# Patient Record
Sex: MALE | Race: WHITE | NOT HISPANIC OR LATINO | Employment: FULL TIME | URBAN - METROPOLITAN AREA
[De-identification: names, ages, dates, MRNs, and addresses within clinical notes are randomized per-mention and may not be internally consistent; named-entity substitution may affect disease eponyms.]

---

## 2017-03-14 ENCOUNTER — OFFICE VISIT (OUTPATIENT)
Dept: INTERNAL MEDICINE | Facility: CLINIC | Age: 67
End: 2017-03-14

## 2017-03-14 VITALS
BODY MASS INDEX: 26.91 KG/M2 | DIASTOLIC BLOOD PRESSURE: 92 MMHG | WEIGHT: 177.56 LBS | HEART RATE: 72 BPM | RESPIRATION RATE: 20 BRPM | SYSTOLIC BLOOD PRESSURE: 140 MMHG | HEIGHT: 68 IN | TEMPERATURE: 97.1 F

## 2017-03-14 DIAGNOSIS — Z11.59 NEED FOR HEPATITIS C SCREENING TEST: ICD-10-CM

## 2017-03-14 DIAGNOSIS — Z12.5 SCREENING FOR PROSTATE CANCER: ICD-10-CM

## 2017-03-14 DIAGNOSIS — I10 ESSENTIAL HYPERTENSION: Primary | ICD-10-CM

## 2017-03-14 DIAGNOSIS — Z12.11 SCREENING FOR COLON CANCER: ICD-10-CM

## 2017-03-14 DIAGNOSIS — Z00.00 ROUTINE ADULT HEALTH MAINTENANCE: ICD-10-CM

## 2017-03-14 DIAGNOSIS — Z23 NEED FOR TDAP VACCINATION: ICD-10-CM

## 2017-03-14 PROBLEM — M19.90 OSTEOARTHRITIS: Status: ACTIVE | Noted: 2017-03-14

## 2017-03-14 PROBLEM — N28.81 LARGE KIDNEY: Status: ACTIVE | Noted: 2017-03-14

## 2017-03-14 PROBLEM — G47.33 OBSTRUCTIVE SLEEP APNEA SYNDROME: Status: ACTIVE | Noted: 2017-03-14

## 2017-03-14 PROBLEM — K40.90 INGUINAL HERNIA: Status: ACTIVE | Noted: 2017-03-14

## 2017-03-14 LAB
ALBUMIN SERPL-MCNC: 4.6 G/DL (ref 3.2–4.8)
ALBUMIN/GLOB SERPL: 1.7 G/DL (ref 1.5–2.5)
ALP SERPL-CCNC: 67 U/L (ref 25–100)
ALT SERPL W P-5'-P-CCNC: 28 U/L (ref 7–40)
ANION GAP SERPL CALCULATED.3IONS-SCNC: 0 MMOL/L (ref 3–11)
ARTICHOKE IGE QN: 150 MG/DL (ref 0–130)
AST SERPL-CCNC: 24 U/L (ref 0–33)
BASOPHILS # BLD AUTO: 0.04 10*3/MM3 (ref 0–0.2)
BASOPHILS NFR BLD AUTO: 0.5 % (ref 0–1)
BILIRUB SERPL-MCNC: 0.9 MG/DL (ref 0.3–1.2)
BUN BLD-MCNC: 17 MG/DL (ref 9–23)
BUN/CREAT SERPL: 17 (ref 7–25)
CALCIUM SPEC-SCNC: 10.5 MG/DL (ref 8.7–10.4)
CHLORIDE SERPL-SCNC: 103 MMOL/L (ref 99–109)
CHOLEST SERPL-MCNC: 228 MG/DL (ref 0–200)
CLARITY, POC: CLEAR
CO2 SERPL-SCNC: 38 MMOL/L (ref 20–31)
COLOR UR: YELLOW
CREAT BLD-MCNC: 1 MG/DL (ref 0.6–1.3)
DEPRECATED RDW RBC AUTO: 42.2 FL (ref 37–54)
EOSINOPHIL # BLD AUTO: 0.28 10*3/MM3 (ref 0.1–0.3)
EOSINOPHIL NFR BLD AUTO: 3.8 % (ref 0–3)
ERYTHROCYTE [DISTWIDTH] IN BLOOD BY AUTOMATED COUNT: 12.4 % (ref 11.3–14.5)
EXPIRATION DATE: ABNORMAL
GFR SERPL CREATININE-BSD FRML MDRD: 75 ML/MIN/1.73
GLOBULIN UR ELPH-MCNC: 2.7 GM/DL
GLUCOSE BLD-MCNC: 93 MG/DL (ref 70–100)
GLUCOSE UR STRIP-MCNC: NEGATIVE MG/DL
HCT VFR BLD AUTO: 51.9 % (ref 38.9–50.9)
HCV AB SER DONR QL: NORMAL
HDLC SERPL-MCNC: 43 MG/DL (ref 40–60)
HGB BLD-MCNC: 17.7 G/DL (ref 13.1–17.5)
IMM GRANULOCYTES # BLD: 0.05 10*3/MM3 (ref 0–0.03)
IMM GRANULOCYTES NFR BLD: 0.7 % (ref 0–0.6)
KETONES UR QL: NEGATIVE
LEUKOCYTE EST, POC: NEGATIVE
LYMPHOCYTES # BLD AUTO: 1.37 10*3/MM3 (ref 0.6–4.8)
LYMPHOCYTES NFR BLD AUTO: 18.8 % (ref 24–44)
Lab: ABNORMAL
MCH RBC QN AUTO: 31.7 PG (ref 27–31)
MCHC RBC AUTO-ENTMCNC: 34.1 G/DL (ref 32–36)
MCV RBC AUTO: 92.8 FL (ref 80–99)
MONOCYTES # BLD AUTO: 0.61 10*3/MM3 (ref 0–1)
MONOCYTES NFR BLD AUTO: 8.4 % (ref 0–12)
NEUTROPHILS # BLD AUTO: 4.94 10*3/MM3 (ref 1.5–8.3)
NEUTROPHILS NFR BLD AUTO: 67.8 % (ref 41–71)
NITRITE UR-MCNC: NEGATIVE MG/ML
PH UR: 7 [PH] (ref 5–8)
PLATELET # BLD AUTO: 285 10*3/MM3 (ref 150–450)
PMV BLD AUTO: 9.8 FL (ref 6–12)
POTASSIUM BLD-SCNC: 4.3 MMOL/L (ref 3.5–5.5)
PROT SERPL-MCNC: 7.3 G/DL (ref 5.7–8.2)
PROT UR STRIP-MCNC: NEGATIVE MG/DL
PROT/CREAT UR: 50 MG/G CREA
PSA SERPL-MCNC: 3.2 NG/ML (ref 0–4)
RBC # BLD AUTO: 5.59 10*6/MM3 (ref 4.2–5.76)
RBC # UR STRIP: ABNORMAL /UL
SODIUM BLD-SCNC: 141 MMOL/L (ref 132–146)
SP GR UR: 1.01 (ref 1–1.03)
TRIGL SERPL-MCNC: 258 MG/DL (ref 0–150)
TSH SERPL DL<=0.05 MIU/L-ACNC: 1.6 MIU/ML (ref 0.35–5.35)
WBC NRBC COR # BLD: 7.29 10*3/MM3 (ref 3.5–10.8)

## 2017-03-14 PROCEDURE — 81002 URINALYSIS NONAUTO W/O SCOPE: CPT | Performed by: INTERNAL MEDICINE

## 2017-03-14 PROCEDURE — 36415 COLL VENOUS BLD VENIPUNCTURE: CPT | Performed by: INTERNAL MEDICINE

## 2017-03-14 PROCEDURE — 84153 ASSAY OF PSA TOTAL: CPT | Performed by: INTERNAL MEDICINE

## 2017-03-14 PROCEDURE — 84443 ASSAY THYROID STIM HORMONE: CPT | Performed by: INTERNAL MEDICINE

## 2017-03-14 PROCEDURE — 99214 OFFICE O/P EST MOD 30 MIN: CPT | Performed by: INTERNAL MEDICINE

## 2017-03-14 PROCEDURE — 86803 HEPATITIS C AB TEST: CPT | Performed by: INTERNAL MEDICINE

## 2017-03-14 PROCEDURE — 93000 ELECTROCARDIOGRAM COMPLETE: CPT | Performed by: INTERNAL MEDICINE

## 2017-03-14 PROCEDURE — 90471 IMMUNIZATION ADMIN: CPT | Performed by: INTERNAL MEDICINE

## 2017-03-14 PROCEDURE — 90715 TDAP VACCINE 7 YRS/> IM: CPT | Performed by: INTERNAL MEDICINE

## 2017-03-14 PROCEDURE — 80061 LIPID PANEL: CPT | Performed by: INTERNAL MEDICINE

## 2017-03-14 PROCEDURE — 85025 COMPLETE CBC W/AUTO DIFF WBC: CPT | Performed by: INTERNAL MEDICINE

## 2017-03-14 PROCEDURE — 80053 COMPREHEN METABOLIC PANEL: CPT | Performed by: INTERNAL MEDICINE

## 2017-03-14 RX ORDER — HYDROCHLOROTHIAZIDE 25 MG/1
25 TABLET ORAL DAILY
Qty: 30 TABLET | Refills: 5 | Status: SHIPPED | OUTPATIENT
Start: 2017-03-14 | End: 2022-05-09 | Stop reason: SDUPTHER

## 2017-03-14 RX ORDER — ASPIRIN 81 MG/1
81 TABLET ORAL DAILY
COMMUNITY
End: 2022-06-15

## 2017-03-14 NOTE — PROGRESS NOTES
Subjective       James Meese is a 66 y.o. male.     Chief Complaint   Patient presents with   • Hypertension     3 weeks        History obtained from the patient.      History of Present Illness       Primary Care Cardiac Diagnostic Constellation: The patient is here today for a follow-up visit.     His Hypertension has been unstable   The patient is not adherent with his medication regimen.   The patient complains of medication side effects. Medication side effects: urinary frequency.     Interval Events: He was last seen 9/29/15. HCTZ was started in September 2015, but he stopped it 2-3 months after that. Blood pressure was running 120-130 / 70-80.  It has been elevated for 2-3 weeks, 140-150 / .  He has had increased work and marital stress lately.       Symptoms: Denies chest pain, denies intermittent leg claudication, denies dyspnea, denies lower extremity edema and denies fatigue.   Associated symptoms include recent 7 pounds weight gain, but no palpitations, no syncope, no headache, no orthopnea and no PND.     Lifestyle and Disease Management: Diet: He consumes a diverse and healthy diet. Weight Issues: He has weight concerns. Exercise: He exercises regularly. He exercises 3 times per week. Exercise includes walking and strength training.   Smoking: He does not use tobacco.       No current outpatient prescriptions on file prior to visit.     No current facility-administered medications on file prior to visit.          The following portions of the patient's history were reviewed and updated as appropriate: allergies, current medications, past family history, past medical history, past social history, past surgical history and problem list.    Review of Systems   Constitutional: Negative for fatigue and unexpected weight change.   Eyes: Negative for visual disturbance.   Respiratory: Positive for chest tightness. Negative for cough, shortness of breath and wheezing.    Cardiovascular: Negative for  "chest pain, palpitations and leg swelling.        No GAVIN, orthopnea, or claudication.   Gastrointestinal: Negative for abdominal pain, blood in stool, constipation, diarrhea, nausea and vomiting. Anal bleeding: melena.   Endocrine: Negative for polydipsia and polyuria.   Musculoskeletal: Negative for arthralgias and myalgias.   Neurological: Negative for dizziness, syncope, light-headedness and headaches.        No memory issues.   Psychiatric/Behavioral: Negative for decreased concentration, sleep disturbance and suicidal ideas. The patient is nervous/anxious.         Denies depression.         Objective       Blood pressure 140/92, pulse 72, temperature 97.1 °F (36.2 °C), temperature source Temporal Artery , resp. rate 20, height 68.25\" (173.4 cm), weight 177 lb 9 oz (80.5 kg).      Physical Exam   Constitutional: He appears well-developed and well-nourished.   Neck: Normal range of motion. Neck supple. Carotid bruit is not present. No thyromegaly present.   Cardiovascular: Normal rate, regular rhythm, normal heart sounds and intact distal pulses.  Exam reveals no gallop and no friction rub.    No murmur heard.  No peripheral edema.   Pulmonary/Chest: Effort normal and breath sounds normal.   Abdominal: Soft. Bowel sounds are normal. He exhibits no distension, no abdominal bruit and no mass. There is no hepatosplenomegaly. There is no tenderness.   Psychiatric: He has a normal mood and affect.   Nursing note and vitals reviewed.         ECG 12 Lead  Date/Time: 3/14/2017 11:10 AM  Performed by: ASHLEY POSADA  Authorized by: ASHLEY POSADA   Comparison: compared with previous ECG from 9/29/2015  Similar to previous ECG  Rhythm: sinus rhythm  Ectopy comments: none  Rate: normal  Conduction: conduction normal  ST Segments: ST segments normal  QRS axis: normal  Other findings comments: nonspecific T wave changes, low voltage  Clinical impression: abnormal ECG          POC Urinalysis Dipstick, Multipro   Result Value " Ref Range    Color Yellow Yellow, Straw, Dark Yellow, Zoila    Clarity, UA Clear Clear    Glucose, UA Negative Negative, 1000 mg/dL (3+) mg/dL    Ketones, UA Negative Negative    Specific Gravity  1.010 1.005 - 1.030    Blood, UA Trace (A) Negative    pH, Urine 7.0 5.0 - 8.0    Protein, POC Negative Negative mg/dL    Leukocytes Negative Negative    Nitrite, UA Negative Negative    Protein/Creatinine Ratio, Urine 50.0 mg/G Crea    Lot Number [id number]     Expiration Date 10-17          Assessment / Plan:    Raudel was seen today for hypertension.    Diagnoses and all orders for this visit:    Essential hypertension  -     TSH  -     Lipid Panel  -     Comprehensive Metabolic Panel  -     CBC & Differential  -     POC Urinalysis Dipstick, Multipro  -     ECG 12 Lead  -     hydrochlorothiazide (HYDRODIURIL) 25 MG tablet; Take 1 tablet by mouth Daily.  -     CBC Auto Differential    Need for hepatitis C screening test  -     Hepatitis C Antibody    Need for Tdap vaccination  -     Tdap Vaccine Greater Than or Equal To 8yo IM    Screening for prostate cancer  -     PSA    Routine adult health maintenance  -     Tdap Vaccine Greater Than or Equal To 8yo IM    Screening for colon cancer  -     Ambulatory Referral For Screening Colonoscopy      Return in about 2 weeks (around 3/28/2017) for Recheck-HTN, fasting.

## 2017-03-15 PROBLEM — N28.1 SIMPLE RENAL CYST: Status: ACTIVE | Noted: 2017-03-14

## 2017-03-15 PROBLEM — K40.90 INGUINAL HERNIA: Status: RESOLVED | Noted: 2017-03-14 | Resolved: 2017-03-15

## 2017-03-17 DIAGNOSIS — R31.29 MICROSCOPIC HEMATURIA: ICD-10-CM

## 2017-03-17 DIAGNOSIS — D75.1 POLYCYTHEMIA, SECONDARY: ICD-10-CM

## 2017-03-17 DIAGNOSIS — I10 ESSENTIAL HYPERTENSION: Primary | ICD-10-CM

## 2017-03-21 ENCOUNTER — HOSPITAL ENCOUNTER (OUTPATIENT)
Dept: ULTRASOUND IMAGING | Facility: HOSPITAL | Age: 67
Discharge: HOME | End: 2017-03-21
Attending: INTERNAL MEDICINE | Admitting: INTERNAL MEDICINE

## 2017-03-21 DIAGNOSIS — R31.29 MICROSCOPIC HEMATURIA: ICD-10-CM

## 2017-03-21 DIAGNOSIS — I10 ESSENTIAL HYPERTENSION: ICD-10-CM

## 2017-03-21 DIAGNOSIS — D75.1 POLYCYTHEMIA, SECONDARY: ICD-10-CM

## 2017-03-21 PROCEDURE — 76775 US EXAM ABDO BACK WALL LIM: CPT

## 2017-03-27 ENCOUNTER — TELEPHONE (OUTPATIENT)
Dept: INTERNAL MEDICINE | Facility: CLINIC | Age: 67
End: 2017-03-27

## 2017-03-27 NOTE — TELEPHONE ENCOUNTER
----- Message from Zoila Newberry sent at 3/27/2017  9:09 AM EDT -----  PTS WIFE CALLED, TERRENCE    PT IS IN [place] WITH FAMILY, HIS FATHER PASSED AWAY THIS WEEKEND. THEY WILL CALL BACK TO R/S. I HAVE CANCELLED APPT. TERRENCE WANTED TO LET YOU KNOW THE REASON BC THEY HAVE BEEN WITH YOU SEVERAL YEARS, SHE STATED.     [phone], TERRENCE

## 2020-02-05 PROBLEM — C44.311 BASAL CELL CARCINOMA (BCC) OF SKIN OF NOSE: Status: ACTIVE | Noted: 2020-02-05

## 2020-08-11 PROBLEM — D03.59 MELANOMA IN SITU OF TORSO EXCLUDING BREAST: Status: ACTIVE | Noted: 2020-08-11

## 2020-08-11 PROBLEM — C44.320 SCC (SQUAMOUS CELL CARCINOMA), FACE: Status: ACTIVE | Noted: 2020-08-11

## 2022-04-19 ENCOUNTER — TELEPHONE (OUTPATIENT)
Dept: INTERNAL MEDICINE | Facility: CLINIC | Age: 72
End: 2022-04-19

## 2022-05-09 ENCOUNTER — OFFICE VISIT (OUTPATIENT)
Dept: INTERNAL MEDICINE | Facility: CLINIC | Age: 72
End: 2022-05-09

## 2022-05-09 VITALS
TEMPERATURE: 98.2 F | DIASTOLIC BLOOD PRESSURE: 80 MMHG | SYSTOLIC BLOOD PRESSURE: 144 MMHG | BODY MASS INDEX: 25.61 KG/M2 | RESPIRATION RATE: 20 BRPM | HEART RATE: 80 BPM | WEIGHT: 169 LBS | HEIGHT: 68 IN

## 2022-05-09 DIAGNOSIS — Z12.5 SCREENING FOR PROSTATE CANCER: ICD-10-CM

## 2022-05-09 DIAGNOSIS — Z12.11 SCREENING FOR COLON CANCER: ICD-10-CM

## 2022-05-09 DIAGNOSIS — I10 ESSENTIAL HYPERTENSION: Primary | ICD-10-CM

## 2022-05-09 PROCEDURE — 99204 OFFICE O/P NEW MOD 45 MIN: CPT | Performed by: INTERNAL MEDICINE

## 2022-05-09 RX ORDER — HYDROCHLOROTHIAZIDE 25 MG/1
25 TABLET ORAL DAILY
Qty: 30 TABLET | Refills: 5 | Status: SHIPPED | OUTPATIENT
Start: 2022-05-09 | End: 2022-09-30 | Stop reason: SINTOL

## 2022-05-09 NOTE — PATIENT INSTRUCTIONS
I recommend Shingrix (new Shingles vaccine) and Hepatitis A vaccination at the pharmacy.    Please call if you would like to have a Cardiac CT Scan scheduled (information given today).    MyPlate from USDA    MyPlate is an outline of a general healthy diet based on the 2010 Dietary Guidelines for Americans, from the U.S. Department of Agriculture (USDA). It sets guidelines for how To follow MyPlate recommendations:  Eat a wide variety of fruits and vegetables, grains, and protein foods.  Serve smaller portions and eat less food throughout the day.  Limit portion sizes to avoid overeating.  Enjoy your food.  Get at least 150 minutes of exercise every week. This is about 30 minutes each day, 5 or more days per week.  It can be difficult to have every meal look like MyPlate. Think about MyPlate as eating guidelines for an entire day, rather than each individual meal.  Fruits and vegetables  Make half of your plate fruits and vegetables.  Eat many different colors of fruits and vegetables each day.  For a 2,000 calorie daily food plan, eat:  2½ cups of vegetables every day.  2 cups of fruit every day.  1 cup is equal to:  1 cup raw or cooked vegetables.  1 cup raw fruit.  1 medium-sized orange, apple, or banana.  1 cup 100% fruit or vegetable juice.  2 cups raw leafy greens, such as lettuce, spinach, or kale.  ½ cup dried fruit.  Grains  One fourth of your plate should be grains.  Make at least half of the grains you eat each day whole grains.  For a 2,000 calorie daily food plan, eat 6 oz of grains every day.  1 oz is equal to:  1 slice bread.  1 cup cereal.  ½ cup cooked rice, cereal, or pasta.  Protein  One fourth of your plate should be protein.  Eat a wide variety of protein foods, including meat, poultry, fish, eggs, beans, nuts, and tofu.  For a 2,000 calorie daily food plan, eat 5½ oz of protein every day.  1 oz is equal to:  1 oz meat, poultry, or fish.  ¼ cup cooked beans.  1 egg.  ½ oz nuts or seeds.  1 Tbsp  peanut butter.  Dairy  Drink fat-free or low-fat (1%) milk.  Eat or drink dairy as a side to meals.  For a 2,000 calorie daily food plan, eat or drink 3 cups of dairy every day.  1 cup is equal to:  1 cup milk, yogurt, cottage cheese, or soy milk (soy beverage).  2 oz processed cheese.  1½ oz natural cheese.  Fats, oils, salt, and sugars  Only small amounts of oils are recommended.  Avoid foods that are high in calories and low in nutritional value (empty calories), like foods high in fat or added sugars.  Choose foods that are low in salt (sodium). Choose foods that have less than 140 milligrams (mg) of sodium per serving.  Drink water instead of sugary drinks. Drink enough water each day to keep your urine pale yellow.  Where to find support  Work with your health care provider or a nutrition specialist (dietitian) to develop a customized eating plan that is right for you.  Download an lynnette (mobile application) to help you track your daily food intake.  Where to find more information  Go to ChooseMyPlate.gov for more information.  Summary  MyPlate is a general guideline for healthy eating from the USDA. It is based on the 2010 Dietary Guidelines for Americans.  In general, fruits and vegetables should take up ½ of your plate, grains should take up ¼ of your plate, and protein should take up ¼ of your plate.  This information is not intended to replace advice given to you by your health care provider. Make sure you discuss any questions you have with your health care provider.  Document Revised: 05/21/2020 Document Reviewed: 03/19/2018  Elsevier Patient Education © 2021 [employer].  Calorie Counting for Weight Loss  Calories are units of energy. Your body needs a certain number of calories from food to keep going throughout the day. When you eat or drink more calories than your body needs, your body stores the extra calories mostly as fat. When you eat or drink fewer calories than your body needs, your body burns  fat to get the energy it needs.  Calorie counting means keeping track of how many calories you eat and drink each day. Calorie counting can be helpful if you need to lose weight. If you eat fewer calories than your body needs, you should lose weight. Ask your health care provider what a healthy weight is for you.  For calorie counting to work, you will need to eat the right number of calories each day to lose a healthy amount of weight per week. A dietitian can help you figure out how many calories you need in a day and will suggest ways to reach your calorie goal.  A healthy amount of weight to lose each week is usually 1-2 lb (0.5-0.9 kg). This usually means that your daily calorie intake should be reduced by 500-750 calories.  Eating 1,200-1,500 calories a day can help most women lose weight.  Eating 1,500-1,800 calories a day can help most men lose weight.  What do I need to know about calorie counting?  Work with your health care provider or dietitian to determine how many calories you should get each day. To meet your daily calorie goal, you will need to:  Find out how many calories are in each food that you would like to eat. Try to do this before you eat.  Decide how much of the food you plan to eat.  Keep a food log. Do this by writing down what you ate and how many calories it had.  To successfully lose weight, it is important to balance calorie counting with a healthy lifestyle that includes regular activity.  Where do I find calorie information?    The number of calories in a food can be found on a Nutrition Facts label. If a food does not have a Nutrition Facts label, try to look up the calories online or ask your dietitian for help.  Remember that calories are listed per serving. If you choose to have more than one serving of a food, you will have to multiply the calories per serving by the number of servings you plan to eat. For example, the label on a package of bread might say that a serving size is  1 slice and that there are 90 calories in a serving. If you eat 1 slice, you will have eaten 90 calories. If you eat 2 slices, you will have eaten 180 calories.  How do I keep a food log?  After each time that you eat, record the following in your food log as soon as possible:  What you ate. Be sure to include toppings, sauces, and other extras on the food.  How much you ate. This can be measured in cups, ounces, or number of items.  How many calories were in each food and drink.  The total number of calories in the food you ate.  Keep your food log near you, such as in a pocket-sized notebook or on an lynnette or website on your mobile phone. Some programs will calculate calories for you and show you how many calories you have left to meet your daily goal.  What are some portion-control tips?  Know how many calories are in a serving. This will help you know how many servings you can have of a certain food.  Use a measuring cup to measure serving sizes. You could also try weighing out portions on a kitchen scale. With time, you will be able to estimate serving sizes for some foods.  Take time to put servings of different foods on your favorite plates or in your favorite bowls and cups so you know what a serving looks like.  Try not to eat straight from a food's packaging, such as from a bag or box. Eating straight from the package makes it hard to see how much you are eating and can lead to overeating. Put the amount you would like to eat in a cup or on a plate to make sure you are eating the right portion.  Use smaller plates, glasses, and bowls for smaller portions and to prevent overeating.  Try not to multitask. For example, avoid watching TV or using your computer while eating. If it is time to eat, sit down at a table and enjoy your food. This will help you recognize when you are full. It will also help you be more mindful of what and how much you are eating.  What are tips for following this plan?  Reading food  labels  Check the calorie count compared with the serving size. The serving size may be smaller than what you are used to eating.  Check the source of the calories. Try to choose foods that are high in protein, fiber, and vitamins, and low in saturated fat, trans fat, and sodium.  Shopping  Read nutrition labels while you shop. This will help you make healthy decisions about which foods to buy.  Pay attention to nutrition labels for low-fat or fat-free foods. These foods sometimes have the same number of calories or more calories than the full-fat versions. They also often have added sugar, starch, or salt to make up for flavor that was removed with the fat.  Make a grocery list of lower-calorie foods and stick to it.  Cooking  Try to cook your favorite foods in a healthier way. For example, try baking instead of frying.  Use low-fat dairy products.  Meal planning  Use more fruits and vegetables. One-half of your plate should be fruits and vegetables.  Include lean proteins, such as chicken, turkey, and fish.  Lifestyle  Each week, aim to do one of the followin minutes of moderate exercise, such as walking.  75 minutes of vigorous exercise, such as running.  General information  Know how many calories are in the foods you eat most often. This will help you calculate calorie counts faster.  Find a way of tracking calories that works for you. Get creative. Try different apps or programs if writing down calories does not work for you.  What foods should I eat?    Eat nutritious foods. It is better to have a nutritious, high-calorie food, such as an avocado, than a food with few nutrients, such as a bag of potato chips.  Use your calories on foods and drinks that will fill you up and will not leave you hungry soon after eating.  Examples of foods that fill you up are nuts and nut butters, vegetables, lean proteins, and high-fiber foods such as whole grains. High-fiber foods are foods with more than 5 g of fiber  per serving.  Pay attention to calories in drinks. Low-calorie drinks include water and unsweetened drinks.  The items listed above may not be a complete list of foods and beverages you can eat. Contact a dietitian for more information.  What foods should I limit?  Limit foods or drinks that are not good sources of vitamins, minerals, or protein or that are high in unhealthy fats. These include:  Candy.  Other sweets.  Sodas, specialty coffee drinks, alcohol, and juice.  The items listed above may not be a complete list of foods and beverages you should avoid. Contact a dietitian for more information.  How do I count calories when eating out?  Pay attention to portions. Often, portions are much larger when eating out. Try these tips to keep portions smaller:  Consider sharing a meal instead of getting your own.  If you get your own meal, eat only half of it. Before you start eating, ask for a container and put half of your meal into it.  When available, consider ordering smaller portions from the menu instead of full portions.  Pay attention to your food and drink choices. Knowing the way food is cooked and what is included with the meal can help you eat fewer calories.  If calories are listed on the menu, choose the lower-calorie options.  Choose dishes that include vegetables, fruits, whole grains, low-fat dairy products, and lean proteins.  Choose items that are boiled, broiled, grilled, or steamed. Avoid items that are buttered, battered, fried, or served with cream sauce. Items labeled as crispy are usually fried, unless stated otherwise.  Choose water, low-fat milk, unsweetened iced tea, or other drinks without added sugar. If you want an alcoholic beverage, choose a lower-calorie option, such as a glass of wine or light beer.  Ask for dressings, sauces, and syrups on the side. These are usually high in calories, so you should limit the amount you eat.  If you want a salad, choose a garden salad and ask for  grilled meats. Avoid extra toppings such as colón, cheese, or fried items. Ask for the dressing on the side, or ask for olive oil and vinegar or lemon to use as dressing.  Estimate how many servings of a food you are given. Knowing serving sizes will help you be aware of how much food you are eating at restaurants.  Where to find more information  Centers for Disease Control and Prevention: www.cdc.gov  U.S. Department of Agriculture: myplate.gov  Summary  Calorie counting means keeping track of how many calories you eat and drink each day. If you eat fewer calories than your body needs, you should lose weight.  A healthy amount of weight to lose per week is usually 1-2 lb (0.5-0.9 kg). This usually means reducing your daily calorie intake by 500-750 calories.  The number of calories in a food can be found on a Nutrition Facts label. If a food does not have a Nutrition Facts label, try to look up the calories online or ask your dietitian for help.  Use smaller plates, glasses, and bowls for smaller portions and to prevent overeating.  Use your calories on foods and drinks that will fill you up and not leave you hungry shortly after a meal.  This information is not intended to replace advice given to you by your health care provider. Make sure you discuss any questions you have with your health care provider.  Document Revised: 01/28/2021 Document Reviewed: 01/28/2021  [employer] Patient Education © 2021 [employer]  Exercising to Lose Weight  Exercise is structured, repetitive physical activity to improve fitness and health. Getting regular exercise is important for everyone. It is especially important if you are overweight. Being overweight increases your risk of heart disease, stroke, diabetes, high blood pressure, and several types of cancer. Reducing your calorie intake and exercising can help you lose weight.  Exercise is usually categorized as moderate or vigorous intensity. To lose weight, most people need to  do a certain amount of moderate-intensity or vigorous-intensity exercise each week.  Moderate-intensity exercise    Moderate-intensity exercise is any activity that gets you moving enough to burn at least three times more energy (calories) than if you were sitting.  Examples of moderate exercise include:  Walking a mile in 15 minutes.  Doing light yard work.  Biking at an easy pace.  Most people should get at least 150 minutes (2 hours and 30 minutes) a week of moderate-intensity exercise to maintain their body weight.  Vigorous-intensity exercise  Vigorous-intensity exercise is any activity that gets you moving enough to burn at least six times more calories than if you were sitting. When you exercise at this intensity, you should be working hard enough that you are not able to carry on a conversation.  Examples of vigorous exercise include:  Running.  Playing a team sport, such as football, basketball, and soccer.  Jumping rope.  Most people should get at least 75 minutes (1 hour and 15 minutes) a week of vigorous-intensity exercise to maintain their body weight.  How can exercise affect me?  When you exercise enough to burn more calories than you eat, you lose weight. Exercise also reduces body fat and builds muscle. The more muscle you have, the more calories you burn. Exercise also:  Improves mood.  Reduces stress and tension.  Improves your overall fitness, flexibility, and endurance.  Increases bone strength.  The amount of exercise you need to lose weight depends on:  Your age.  The type of exercise.  Any health conditions you have.  Your overall physical ability.  Talk to your health care provider about how much exercise you need and what types of activities are safe for you.  What actions can I take to lose weight?  Nutrition    Make changes to your diet as told by your health care provider or diet and nutrition specialist (dietitian). This may include:  Eating fewer calories.  Eating more protein.  Eating  less unhealthy fats.  Eating a diet that includes fresh fruits and vegetables, whole grains, low-fat dairy products, and lean protein.  Avoiding foods with added fat, salt, and sugar.  Drink plenty of water while you exercise to prevent dehydration or heat stroke.    Activity  Choose an activity that you enjoy and set realistic goals. Your health care provider can help you make an exercise plan that works for you.  Exercise at a moderate or vigorous intensity most days of the week.  The intensity of exercise may vary from person to person. You can tell how intense a workout is for you by paying attention to your breathing and heartbeat. Most people will notice their breathing and heartbeat get faster with more intense exercise.  Do resistance training twice each week, such as:  Push-ups.  Sit-ups.  Lifting weights.  Using resistance bands.  Getting short amounts of exercise can be just as helpful as long structured periods of exercise. If you have trouble finding time to exercise, try to include exercise in your daily routine.  Get up, stretch, and walk around every 30 minutes throughout the day.  Go for a walk during your lunch break.  Park your car farther away from your destination.  If you take public transportation, get off one stop early and walk the rest of the way.  Make phone calls while standing up and walking around.  Take the stairs instead of elevators or escalators.  Wear comfortable clothes and shoes with good support.  Do not exercise so much that you hurt yourself, feel dizzy, or get very short of breath.  Where to find more information  [place] Department of Health and Human Services: www.hhs.gov  Centers for Disease Control and Prevention (CDC): www.cdc.gov  Contact a health care provider:  Before starting a new exercise program.  If you have questions or concerns about your weight.  If you have a medical problem that keeps you from exercising.  Get help right away if you have any of the following  while exercising:  Injury.  Dizziness.  Difficulty breathing or shortness of breath that does not go away when you stop exercising.  Chest pain.  Rapid heartbeat.  Summary  Being overweight increases your risk of heart disease, stroke, diabetes, high blood pressure, and several types of cancer.  Losing weight happens when you burn more calories than you eat.  Reducing the amount of calories you eat in addition to getting regular moderate or vigorous exercise each week helps you lose weight.  This information is not intended to replace advice given to you by your health care provider. Make sure you discuss any questions you have with your health care provider.  Document Revised: 04/15/2021 Document Reviewed: 04/15/2021  [employer] Patient Education © 2021 [employer] Inc.

## 2022-05-09 NOTE — PROGRESS NOTES
Subjective       James G Meese is a 71 y.o. male.     Chief Complaint   Patient presents with   • Hypertension     Re-establish care    average /86         History obtained from the patient.      History of Present Illness     The patient is re-establishing care, last seen 3/14/2017.    He has mild DAGMAR, stable on CPAP.    Primary Care Cardiac Diagnostic Constellation: The patient is here today for a follow-up visit.      His Hypertension has been unstable   Medication: None.    Interval Events:  HCTZ was started in September 2015, but he stopped it 2-3 months after that due to urinary frequency. Blood pressure at home is running 120-175 / 70-80.      Symptoms: Intermittent lower extremity edema.  Denies chest pain, shortness of breath, GAVIN, orthopnea, PND, claudication, lightheadedness, dizziness.    Associated Symptoms: Weight decreased 8 pounds since last visit on 3/14/2017.  Denies fatigue, headache, polyuria, polydipsia, myalgias, arthralgias, visual impairment, memory loss, concentration issues, and focal neurological deficit.     Lifestyle: He consumes a diverse and healthy diet.  He exercises regularly.   Tobacco Use: Never a smoker.     Current Outpatient Medications on File Prior to Visit   Medication Sig Dispense Refill   • aspirin 81 MG EC tablet Take 81 mg by mouth Daily.       No current facility-administered medications on file prior to visit.       Current outpatient and discharge medications have been reconciled for the patient.  Reviewed by: Breana Harden MD        The following portions of the patient's history were reviewed and updated as appropriate: allergies, current medications, past family history, past medical history, past social history, past surgical history and problem list.    Review of Systems   Constitutional: Negative for fatigue and unexpected weight change.   Eyes: Negative for visual disturbance.   Respiratory: Negative for cough, shortness of breath and wheezing.   "  Cardiovascular: Positive for leg swelling. Negative for chest pain and palpitations.        No GAVIN, orthopnea, or claudication.   Gastrointestinal: Negative for abdominal pain, blood in stool, constipation, diarrhea, nausea and vomiting.        Denies melena.   Endocrine: Negative for polydipsia and polyuria.   Musculoskeletal: Negative for arthralgias and myalgias.   Neurological: Negative for dizziness, syncope, light-headedness and headaches.        No memory issues.   Psychiatric/Behavioral: Negative for decreased concentration.         Objective       Blood pressure 144/80, pulse 80, temperature 98.2 °F (36.8 °C), temperature source Temporal, resp. rate 20, height 172.1 cm (67.75\"), weight 76.7 kg (169 lb).  Body mass index is 25.89 kg/m².      Physical Exam  Vitals and nursing note reviewed.   Constitutional:       Appearance: He is well-developed.      Comments: Overweight.   Neck:      Thyroid: No thyroid mass or thyromegaly.      Vascular: No carotid bruit.   Cardiovascular:      Rate and Rhythm: Normal rate and regular rhythm.      Pulses: Normal pulses.      Heart sounds: Normal heart sounds. No murmur heard.    No friction rub. No gallop.   Pulmonary:      Effort: Pulmonary effort is normal.      Breath sounds: Normal breath sounds.   Abdominal:      General: Bowel sounds are normal. There is no distension or abdominal bruit.      Palpations: Abdomen is soft. There is no hepatomegaly, splenomegaly or mass.      Tenderness: There is no abdominal tenderness.   Musculoskeletal:      Cervical back: Normal range of motion and neck supple.      Right lower leg: No edema.      Left lower leg: No edema.   Neurological:      Mental Status: He is alert.   Psychiatric:         Mood and Affect: Mood normal.         Assessment / Plan:  Diagnoses and all orders for this visit:    1. Essential hypertension (Primary)  -     hydroCHLOROthiazide (HYDRODIURIL) 25 MG tablet; Take 1 tablet by mouth Daily.  Dispense: 30 " tablet; Refill: 5- NEW  -     Lipid Panel; Future  -     Comprehensive Metabolic Panel; Future  -     TSH; Future  -     CBC & Differential; Future  -     POC Urinalysis Dipstick, Automated; Future    2. Screening for prostate cancer  -     PSA Screen; Future    3. Screening for colon cancer  -     Cologuard - Stool, Per Rectum; Future      The patient agrees to return for fasting labs.    The patient declined a COVID booster today.    I recommended Shingrix (new Shingles vaccine) and Hepatitis A vaccination at the pharmacy.    Patient agrees to call if he would like to have a Cardiac CT Scan scheduled (information given today).    BMI is >= 25 and < 30. (Overweight) The following options were offered after discussion: exercise counseling/recommendations and nutrition counseling/recommendations          Return in about 1 month (around 6/9/2022) for schedule initial Medicare Wellness Exam, non-fasting.

## 2022-05-10 ENCOUNTER — LAB (OUTPATIENT)
Dept: LAB | Facility: HOSPITAL | Age: 72
End: 2022-05-10

## 2022-05-10 ENCOUNTER — LAB (OUTPATIENT)
Dept: INTERNAL MEDICINE | Facility: CLINIC | Age: 72
End: 2022-05-10

## 2022-05-10 DIAGNOSIS — R31.29 MICROSCOPIC HEMATURIA: Primary | ICD-10-CM

## 2022-05-10 DIAGNOSIS — Z12.5 SCREENING FOR PROSTATE CANCER: ICD-10-CM

## 2022-05-10 DIAGNOSIS — R31.29 MICROSCOPIC HEMATURIA: ICD-10-CM

## 2022-05-10 DIAGNOSIS — I10 ESSENTIAL HYPERTENSION: ICD-10-CM

## 2022-05-10 LAB
ALBUMIN SERPL-MCNC: 4.6 G/DL (ref 3.5–5.2)
ALBUMIN/GLOB SERPL: 1.8 G/DL
ALP SERPL-CCNC: 69 U/L (ref 39–117)
ALT SERPL W P-5'-P-CCNC: 15 U/L (ref 1–41)
ANION GAP SERPL CALCULATED.3IONS-SCNC: 13.6 MMOL/L (ref 5–15)
AST SERPL-CCNC: 22 U/L (ref 1–40)
BACTERIA UR QL AUTO: NORMAL /HPF
BASOPHILS # BLD AUTO: 0.05 10*3/MM3 (ref 0–0.2)
BASOPHILS NFR BLD AUTO: 0.8 % (ref 0–1.5)
BILIRUB BLD-MCNC: NEGATIVE MG/DL
BILIRUB SERPL-MCNC: 1.1 MG/DL (ref 0–1.2)
BUN SERPL-MCNC: 14 MG/DL (ref 8–23)
BUN/CREAT SERPL: 13.9 (ref 7–25)
CALCIUM SPEC-SCNC: 9.8 MG/DL (ref 8.6–10.5)
CHLORIDE SERPL-SCNC: 102 MMOL/L (ref 98–107)
CHOLEST SERPL-MCNC: 185 MG/DL (ref 0–200)
CLARITY, POC: CLEAR
CO2 SERPL-SCNC: 25.4 MMOL/L (ref 22–29)
COLOR UR: YELLOW
CREAT SERPL-MCNC: 1.01 MG/DL (ref 0.76–1.27)
DEPRECATED RDW RBC AUTO: 41.2 FL (ref 37–54)
EGFRCR SERPLBLD CKD-EPI 2021: 80.5 ML/MIN/1.73
EOSINOPHIL # BLD AUTO: 0.13 10*3/MM3 (ref 0–0.4)
EOSINOPHIL NFR BLD AUTO: 2.1 % (ref 0.3–6.2)
ERYTHROCYTE [DISTWIDTH] IN BLOOD BY AUTOMATED COUNT: 12.4 % (ref 12.3–15.4)
EXPIRATION DATE: ABNORMAL
GLOBULIN UR ELPH-MCNC: 2.6 GM/DL
GLUCOSE SERPL-MCNC: 81 MG/DL (ref 65–99)
GLUCOSE UR STRIP-MCNC: NEGATIVE MG/DL
HCT VFR BLD AUTO: 50.9 % (ref 37.5–51)
HDLC SERPL-MCNC: 39 MG/DL (ref 40–60)
HGB BLD-MCNC: 17 G/DL (ref 13–17.7)
HYALINE CASTS UR QL AUTO: NORMAL /LPF
IMM GRANULOCYTES # BLD AUTO: 0.03 10*3/MM3 (ref 0–0.05)
IMM GRANULOCYTES NFR BLD AUTO: 0.5 % (ref 0–0.5)
KETONES UR QL: NEGATIVE
LDLC SERPL CALC-MCNC: 130 MG/DL (ref 0–100)
LDLC/HDLC SERPL: 3.31 {RATIO}
LEUKOCYTE EST, POC: NEGATIVE
LYMPHOCYTES # BLD AUTO: 1.07 10*3/MM3 (ref 0.7–3.1)
LYMPHOCYTES NFR BLD AUTO: 16.9 % (ref 19.6–45.3)
Lab: ABNORMAL
MCH RBC QN AUTO: 30.7 PG (ref 26.6–33)
MCHC RBC AUTO-ENTMCNC: 33.4 G/DL (ref 31.5–35.7)
MCV RBC AUTO: 91.9 FL (ref 79–97)
MONOCYTES # BLD AUTO: 0.55 10*3/MM3 (ref 0.1–0.9)
MONOCYTES NFR BLD AUTO: 8.7 % (ref 5–12)
NEUTROPHILS NFR BLD AUTO: 4.5 10*3/MM3 (ref 1.7–7)
NEUTROPHILS NFR BLD AUTO: 71 % (ref 42.7–76)
NITRITE UR-MCNC: NEGATIVE MG/ML
NRBC BLD AUTO-RTO: 0 /100 WBC (ref 0–0.2)
PH UR: 7 [PH] (ref 5–8)
PLATELET # BLD AUTO: 305 10*3/MM3 (ref 140–450)
PMV BLD AUTO: 9.8 FL (ref 6–12)
POTASSIUM SERPL-SCNC: 3.9 MMOL/L (ref 3.5–5.2)
PROT SERPL-MCNC: 7.2 G/DL (ref 6–8.5)
PROT UR STRIP-MCNC: NEGATIVE MG/DL
PSA SERPL-MCNC: 6.79 NG/ML (ref 0–4)
RBC # BLD AUTO: 5.54 10*6/MM3 (ref 4.14–5.8)
RBC # UR STRIP: ABNORMAL /UL
RBC # UR STRIP: NORMAL /HPF
REF LAB TEST METHOD: NORMAL
SODIUM SERPL-SCNC: 141 MMOL/L (ref 136–145)
SP GR UR: 1 (ref 1–1.03)
SQUAMOUS #/AREA URNS HPF: NORMAL /HPF
TRIGL SERPL-MCNC: 85 MG/DL (ref 0–150)
TSH SERPL DL<=0.05 MIU/L-ACNC: 2.27 UIU/ML (ref 0.27–4.2)
UROBILINOGEN UR QL: NORMAL
VLDLC SERPL-MCNC: 16 MG/DL (ref 5–40)
WBC # UR STRIP: NORMAL /HPF
WBC NRBC COR # BLD: 6.33 10*3/MM3 (ref 3.4–10.8)

## 2022-05-10 PROCEDURE — 80061 LIPID PANEL: CPT | Performed by: INTERNAL MEDICINE

## 2022-05-10 PROCEDURE — 84443 ASSAY THYROID STIM HORMONE: CPT | Performed by: INTERNAL MEDICINE

## 2022-05-10 PROCEDURE — 81015 MICROSCOPIC EXAM OF URINE: CPT | Performed by: INTERNAL MEDICINE

## 2022-05-10 PROCEDURE — 80053 COMPREHEN METABOLIC PANEL: CPT | Performed by: INTERNAL MEDICINE

## 2022-05-10 PROCEDURE — 81003 URINALYSIS AUTO W/O SCOPE: CPT | Performed by: INTERNAL MEDICINE

## 2022-05-10 PROCEDURE — 85025 COMPLETE CBC W/AUTO DIFF WBC: CPT | Performed by: INTERNAL MEDICINE

## 2022-05-10 PROCEDURE — G0103 PSA SCREENING: HCPCS | Performed by: INTERNAL MEDICINE

## 2022-05-11 ENCOUNTER — TELEPHONE (OUTPATIENT)
Dept: INTERNAL MEDICINE | Facility: CLINIC | Age: 72
End: 2022-05-11

## 2022-05-11 DIAGNOSIS — R97.20 ELEVATED PSA: Primary | ICD-10-CM

## 2022-05-11 LAB — REF LAB TEST METHOD: NORMAL

## 2022-05-11 NOTE — TELEPHONE ENCOUNTER
Left message for Raudel to return call   Office # given   Hubb please read to patient   Call patient please.  His PSA (prostate screening test) is elevated.  I recommend he see a urologist for further evaluation.  If he is agreeable, I will enter an order.     I will also send a lab letter.

## 2022-05-11 NOTE — TELEPHONE ENCOUNTER
Call patient please.  His PSA (prostate screening test) is elevated.  I recommend he see a urologist for further evaluation.  If he is agreeable, I will enter an order.    I will also send a lab letter.

## 2022-05-11 NOTE — TELEPHONE ENCOUNTER
HUB RELAYED MESSAGE. PATIENT ADKNOWLEDGED AND IS AGREEABE TO THE UROLOGY REFERRAL.    PLEASE CALL AND ADVISE ON THE ORDER

## 2022-06-07 ENCOUNTER — OFFICE VISIT (OUTPATIENT)
Dept: INTERNAL MEDICINE | Facility: CLINIC | Age: 72
End: 2022-06-07

## 2022-06-07 VITALS
TEMPERATURE: 97.8 F | SYSTOLIC BLOOD PRESSURE: 130 MMHG | BODY MASS INDEX: 25.03 KG/M2 | WEIGHT: 169 LBS | OXYGEN SATURATION: 96 % | RESPIRATION RATE: 20 BRPM | DIASTOLIC BLOOD PRESSURE: 68 MMHG | HEART RATE: 70 BPM | HEIGHT: 69 IN

## 2022-06-07 DIAGNOSIS — I10 PRIMARY HYPERTENSION: ICD-10-CM

## 2022-06-07 DIAGNOSIS — Z23 NEED FOR VACCINATION FOR PNEUMOCOCCUS: ICD-10-CM

## 2022-06-07 DIAGNOSIS — Z79.899 HIGH RISK MEDICATION USE: ICD-10-CM

## 2022-06-07 DIAGNOSIS — Z00.00 MEDICARE ANNUAL WELLNESS VISIT, INITIAL: Primary | ICD-10-CM

## 2022-06-07 LAB
ANION GAP SERPL CALCULATED.3IONS-SCNC: 10.2 MMOL/L (ref 5–15)
BUN SERPL-MCNC: 16 MG/DL (ref 8–23)
BUN/CREAT SERPL: 15.4 (ref 7–25)
CALCIUM SPEC-SCNC: 9.1 MG/DL (ref 8.6–10.5)
CHLORIDE SERPL-SCNC: 102 MMOL/L (ref 98–107)
CO2 SERPL-SCNC: 26.8 MMOL/L (ref 22–29)
CREAT SERPL-MCNC: 1.04 MG/DL (ref 0.76–1.27)
EGFRCR SERPLBLD CKD-EPI 2021: 77.7 ML/MIN/1.73
GLUCOSE SERPL-MCNC: 84 MG/DL (ref 65–99)
POTASSIUM SERPL-SCNC: 3.9 MMOL/L (ref 3.5–5.2)
SODIUM SERPL-SCNC: 139 MMOL/L (ref 136–145)

## 2022-06-07 PROCEDURE — 80048 BASIC METABOLIC PNL TOTAL CA: CPT | Performed by: INTERNAL MEDICINE

## 2022-06-07 PROCEDURE — G0009 ADMIN PNEUMOCOCCAL VACCINE: HCPCS | Performed by: INTERNAL MEDICINE

## 2022-06-07 PROCEDURE — 90677 PCV20 VACCINE IM: CPT | Performed by: INTERNAL MEDICINE

## 2022-06-07 PROCEDURE — G0438 PPPS, INITIAL VISIT: HCPCS | Performed by: INTERNAL MEDICINE

## 2022-06-07 PROCEDURE — 1126F AMNT PAIN NOTED NONE PRSNT: CPT | Performed by: INTERNAL MEDICINE

## 2022-06-07 PROCEDURE — 1170F FXNL STATUS ASSESSED: CPT | Performed by: INTERNAL MEDICINE

## 2022-06-07 PROCEDURE — 1159F MED LIST DOCD IN RCRD: CPT | Performed by: INTERNAL MEDICINE

## 2022-06-07 NOTE — PROGRESS NOTES
The ABCs of the Annual Wellness Visit  Initial Medicare Wellness Visit    Chief Complaint   Patient presents with   • initail med well     Subjective      History of Present Illness:    The patient's PSA on 5/10/2022 was elevated (6.8).  He has been referred to Urology and has an appointment on 6/15/2022.    Primary Care Cardiac Diagnostic Constellation: The patient is here today for a follow-up visit.      His Hypertension has been stable   Medication: HCTZ.  Side Effects: Dry mouth and nocturia x1.     Interval Events:  HCTZ was re-started at his visit on 5/10/2022 (/80).  Blood pressure at home is running 147/90 (average), 130-150 / 70-90 (range).  On 5/10/2022, LDL was 130, TG 85.     Symptoms: Intermittent lower extremity edema resolved.  Denies chest pain, shortness of breath, GAVIN, orthopnea, PND, claudication, lightheadedness, dizziness.    Associated Symptoms: No weight change since last visit on 5/10/2022.  Denies fatigue, headache, polyuria, polydipsia, myalgias, arthralgias, visual impairment, memory loss, concentration issues, and focal neurological deficit.     Lifestyle: He consumes a diverse and healthy diet.  He speed walks 5 times per week..   Tobacco Use: Never a smoker.       James G Meese is a 71 y.o. male who presents for an Initial Medicare Wellness Visit.    The following portions of the patient's history were reviewed and   updated as appropriate: allergies, current medications, past family history, past medical history, past social history, past surgical history and problem list.     Compared to one year ago, the patient feels his physical   health is the same.    Compared to one year ago, the patient feels his mental   health is the same.    Recent Hospitalizations:  He was not admitted to the hospital during the last year.       Current Medical Providers:  Patient Care Team:  Breana Harden MD as PCP - General  Breana Harden MD as PCP - Family Medicine    Outpatient Medications  "Prior to Visit   Medication Sig Dispense Refill   • hydroCHLOROthiazide (HYDRODIURIL) 25 MG tablet Take 1 tablet by mouth Daily. 30 tablet 5   • aspirin 81 MG EC tablet Take 81 mg by mouth Daily.       No facility-administered medications prior to visit.       No opioid medication identified on active medication list. I have reviewed chart for other potential  high risk medication/s and harmful drug interactions in the elderly.          Aspirin is on active medication list. Aspirin use is not indicated based on review of current medical condition/s. Risk of harm outweighs potential benefits. Patient instructed to discontinue this medication.  .      Patient Active Problem List   Diagnosis   • Simple renal cyst   • Hypertension   • Obstructive sleep apnea syndrome   • Osteoarthritis   • Basal cell carcinoma (BCC) of skin of nose   • SCC (squamous cell carcinoma), face   • Melanoma in situ of torso excluding breast (HCC)     Advance Care Planning  Advance Directive is on file.  ACP discussion was held with the patient during this visit. Patient has an advance directive in EMR which is still valid.           Objective       Vitals:    06/07/22 1122   BP: 130/68   BP Location: Right arm   Patient Position: Sitting   Cuff Size: Adult   Pulse: 70   Resp: 20   Temp: 97.8 °F (36.6 °C)   TempSrc: Temporal   SpO2: 96%   Weight: 76.7 kg (169 lb)   Height: 175.3 cm (69\")   PainSc: 0-No pain     Estimated body mass index is 24.96 kg/m² as calculated from the following:    Height as of this encounter: 175.3 cm (69\").    Weight as of this encounter: 76.7 kg (169 lb).    BMI is within normal parameters. No other follow-up for BMI required.  Finger Rub Hearing{Test (right ear):passed  Finger Rub Hearing{Test (left ear):passed      Does the patient have evidence of cognitive impairment? No    Physical Exam  Vitals and nursing note reviewed.   Constitutional:       Appearance: He is normal weight.   Neck:      Vascular: No carotid " bruit.   Cardiovascular:      Rate and Rhythm: Normal rate and regular rhythm.      Heart sounds: Normal heart sounds. No murmur heard.  Pulmonary:      Effort: Pulmonary effort is normal.      Breath sounds: Normal breath sounds.   Neurological:      Mental Status: He is alert.   Psychiatric:         Mood and Affect: Mood normal.       Lab Results   Component Value Date    TRIG 85 05/10/2022    HDL 39 (L) 05/10/2022     (H) 05/10/2022    VLDL 16 05/10/2022          HEALTH RISK ASSESSMENT    Smoking Status:  Social History     Tobacco Use   Smoking Status Never Smoker   Smokeless Tobacco Never Used     Alcohol Consumption:  Social History     Substance and Sexual Activity   Alcohol Use Yes    Comment: 1 beer per week     Fall Risk Screen:    JOE Fall Risk Assessment was completed, and patient is at LOW risk for falls.Assessment completed on:6/7/2022    Depression Screen:   PHQ-2/PHQ-9 Depression Screening 6/7/2022   Retired Total Score -   Little Interest or Pleasure in Doing Things 0-->not at all   Feeling Down, Depressed or Hopeless 0-->not at all   PHQ-9: Brief Depression Severity Measure Score 0       Health Habits and Functional and Cognitive Screening:  Functional & Cognitive Status 6/7/2022   Do you have difficulty preparing food and eating? No   Do you have difficulty bathing yourself, getting dressed or grooming yourself? No   Do you have difficulty using the toilet? No   Do you have difficulty moving around from place to place? No   Do you have trouble with steps or getting out of a bed or a chair? No   Current Diet Well Balanced Diet   Dental Exam Up to date   Eye Exam Up to date   Exercise (times per week) 7 times per week   Current Exercises Include Walking   Do you need help using the phone?  No   Are you deaf or do you have serious difficulty hearing?  No   Do you need help with transportation? No   Do you need help shopping? No   Do you need help preparing meals?  No   Do you need help with  housework?  No   Do you need help with laundry? No   Do you need help taking your medications? No   Do you need help managing money? No   Do you ever drive or ride in a car without wearing a seat belt? No   Have you felt unusual stress, anger or loneliness in the last month? No   Who do you live with? Spouse   If you need help, do you have trouble finding someone available to you? No   Have you been bothered in the last four weeks by sexual problems? No   Do you have difficulty concentrating, remembering or making decisions? No       Age-appropriate Screening Schedule:  Refer to the list below for future screening recommendations based on patient's age, sex and/or medical conditions. Orders for these recommended tests are listed in the plan section. The patient has been provided with a written plan.    Health Maintenance   Topic Date Due   • ZOSTER VACCINE (1 of 2) Never done   • INFLUENZA VACCINE  10/01/2022   • PROSTATE CANCER SCREENING  05/10/2023   • TDAP/TD VACCINES (3 - Td or Tdap) 03/14/2027            Assessment & Plan   CMS Preventative Services Quick Reference  Risk Factors Identified During Encounter  Cardiovascular Disease- The patient opted to wait on scheduling a Cardiac CT Scan.  Immunizations Discussed/Encouraged (specific Immunizations; Hepatitis A Vaccine/Series, Prevnar 20 (Pneumococcal 20-valent conjugate), Shingrix and COVID19- I recommended Shingrix (new Shingles vaccine) and Hepatitis A vaccination at the pharmacy. Also recommended the Covid 19 booster.  The patient declined today.  Cologuard ordered 6/1/22-The patient was reminded to return the Cologuard (Colon Cancer screening test) previously ordered.    The above risks/problems have been discussed with the patient.  Follow up actions/plans if indicated are seen below in the Assessment/Plan Section.  Pertinent information has been shared with the patient in the After Visit Summary.    Diagnoses and all orders for this visit:    1. Medicare  annual wellness visit, initial (Primary)    2. Primary hypertension   Continue current medication(s) as noted in the history of present illness.    3. High risk medication use  -     Basic Metabolic Panel    4. Need for vaccination for pneumococcus  -     Pneumococcal Conjugate Vaccine 20-Valent All         Follow Up:  Return in about 6 months (around 12/7/2022) for Recheck HTN, fasting.     An After Visit Summary and PPPS were made available to the patient.

## 2022-06-07 NOTE — PATIENT INSTRUCTIONS
I recommend Shingrix (new Shingles vaccine) and Hepatitis A vaccination at the pharmacy. Also recommend the Covid 19 booster, as we discussed.    Please return the Cologuard (Colon Cancer screening test) previously ordered.    Health Maintenance, Male  Adopting a healthy lifestyle and getting preventive care are important in promoting health and wellness. Ask your health care provider about:  The right schedule for you to have regular tests and exams.  Things you can do on your own to prevent diseases and keep yourself healthy.  What should I know about diet, weight, and exercise?  Eat a healthy diet    Eat a diet that includes plenty of vegetables, fruits, low-fat dairy products, and lean protein.  Do not eat a lot of foods that are high in solid fats, added sugars, or sodium.    Maintain a healthy weight  Body mass index (BMI) is a measurement that can be used to identify possible weight problems. It estimates body fat based on height and weight. Your health care provider can help determine your BMI and help you achieve or maintain a healthy weight.  Get regular exercise  Get regular exercise. This is one of the most important things you can do for your health. Most adults should:  Exercise for at least 150 minutes each week. The exercise should increase your heart rate and make you sweat (moderate-intensity exercise).  Do strengthening exercises at least twice a week. This is in addition to the moderate-intensity exercise.  Spend less time sitting. Even light physical activity can be beneficial.  Watch cholesterol and blood lipids  Have your blood tested for lipids and cholesterol at 20 years of age, then have this test every 5 years.  You may need to have your cholesterol levels checked more often if:  Your lipid or cholesterol levels are high.  You are older than 40 years of age.  You are at high risk for heart disease.  What should I know about cancer screening?  Many types of cancers can be detected early and  may often be prevented. Depending on your health history and family history, you may need to have cancer screening at various ages. This may include screening for:  Colorectal cancer.  Prostate cancer.  Skin cancer.  Lung cancer.  What should I know about heart disease, diabetes, and high blood pressure?  Blood pressure and heart disease  High blood pressure causes heart disease and increases the risk of stroke. This is more likely to develop in people who have high blood pressure readings, are of  descent, or are overweight.  Talk with your health care provider about your target blood pressure readings.  Have your blood pressure checked:  Every 3-5 years if you are 18-39 years of age.  Every year if you are 40 years old or older.  If you are between the ages of 65 and 75 and are a current or former smoker, ask your health care provider if you should have a one-time screening for abdominal aortic aneurysm (AAA).  Diabetes  Have regular diabetes screenings. This checks your fasting blood sugar level. Have the screening done:  Once every three years after age 45 if you are at a normal weight and have a low risk for diabetes.  More often and at a younger age if you are overweight or have a high risk for diabetes.  What should I know about preventing infection?  Hepatitis B  If you have a higher risk for hepatitis B, you should be screened for this virus. Talk with your health care provider to find out if you are at risk for hepatitis B infection.  Hepatitis C  Blood testing is recommended for:  Everyone born from 1945 through 1965.  Anyone with known risk factors for hepatitis C.  Sexually transmitted infections (STIs)  You should be screened each year for STIs, including gonorrhea and chlamydia, if:  You are sexually active and are younger than 24 years of age.  You are older than 24 years of age and your health care provider tells you that you are at risk for this type of infection.  Your sexual activity has  changed since you were last screened, and you are at increased risk for chlamydia or gonorrhea. Ask your health care provider if you are at risk.  Ask your health care provider about whether you are at high risk for HIV. Your health care provider may recommend a prescription medicine to help prevent HIV infection. If you choose to take medicine to prevent HIV, you should first get tested for HIV. You should then be tested every 3 months for as long as you are taking the medicine.  Follow these instructions at home:  Lifestyle  Do not use any products that contain nicotine or tobacco, such as cigarettes, e-cigarettes, and chewing tobacco. If you need help quitting, ask your health care provider.  Do not use street drugs.  Do not share needles.  Ask your health care provider for help if you need support or information about quitting drugs.  Alcohol use  Do not drink alcohol if your health care provider tells you not to drink.  If you drink alcohol:  Limit how much you have to 0-2 drinks a day.  Be aware of how much alcohol is in your drink. In the [place] one drink equals one 12 oz bottle of beer (355 mL), one 5 oz glass of wine (148 mL), or one 1½ oz glass of hard liquor (44 mL).  General instructions  Schedule regular health, dental, and eye exams.  Stay current with your vaccines.  Tell your health care provider if:  You often feel depressed.  You have ever been abused or do not feel safe at home.  Summary  Adopting a healthy lifestyle and getting preventive care are important in promoting health and wellness.  Follow your health care provider's instructions about healthy diet, exercising, and getting tested or screened for diseases.  Follow your health care provider's instructions on monitoring your cholesterol and blood pressure.  This information is not intended to replace advice given to you by your health care provider. Make sure you discuss any questions you have with your health care provider.  Document  Revised: 12/11/2019 Document Reviewed: 12/11/2019  [employer] Patient Education © 2021 [employer]

## 2022-06-15 ENCOUNTER — OFFICE VISIT (OUTPATIENT)
Dept: UROLOGY | Facility: CLINIC | Age: 72
End: 2022-06-15

## 2022-06-15 ENCOUNTER — LAB (OUTPATIENT)
Dept: LAB | Facility: HOSPITAL | Age: 72
End: 2022-06-15

## 2022-06-15 ENCOUNTER — TRANSCRIBE ORDERS (OUTPATIENT)
Dept: LAB | Facility: HOSPITAL | Age: 72
End: 2022-06-15

## 2022-06-15 VITALS
DIASTOLIC BLOOD PRESSURE: 78 MMHG | WEIGHT: 169 LBS | SYSTOLIC BLOOD PRESSURE: 138 MMHG | BODY MASS INDEX: 25.03 KG/M2 | HEART RATE: 76 BPM | HEIGHT: 69 IN | OXYGEN SATURATION: 97 %

## 2022-06-15 DIAGNOSIS — R97.20 ELEVATED PSA: ICD-10-CM

## 2022-06-15 DIAGNOSIS — R97.20 ELEVATED PROSTATE SPECIFIC ANTIGEN (PSA): Primary | ICD-10-CM

## 2022-06-15 LAB
BILIRUB BLD-MCNC: NEGATIVE MG/DL
CLARITY, POC: CLEAR
COLOR UR: YELLOW
EXPIRATION DATE: NORMAL
GLUCOSE UR STRIP-MCNC: NEGATIVE MG/DL
KETONES UR QL: NEGATIVE
LEUKOCYTE EST, POC: NEGATIVE
Lab: NORMAL
NITRITE UR-MCNC: NEGATIVE MG/ML
PH UR: 7.5 [PH] (ref 5–8)
PROT UR STRIP-MCNC: NEGATIVE MG/DL
RBC # UR STRIP: NEGATIVE /UL
SP GR UR: 1.01 (ref 1–1.03)
UROBILINOGEN UR QL: NORMAL

## 2022-06-15 PROCEDURE — 99204 OFFICE O/P NEW MOD 45 MIN: CPT | Performed by: STUDENT IN AN ORGANIZED HEALTH CARE EDUCATION/TRAINING PROGRAM

## 2022-06-15 PROCEDURE — 81003 URINALYSIS AUTO W/O SCOPE: CPT | Performed by: STUDENT IN AN ORGANIZED HEALTH CARE EDUCATION/TRAINING PROGRAM

## 2022-06-15 NOTE — PROGRESS NOTES
Elevated PSA Office Visit      Patient Name: James G Meese  : 1950   MRN: [id number]     Chief Complaint:  Elevated PSA.    Chief Complaint   Patient presents with   • Elevated PSA       Referring Provider: Breana Harden MD    History of Present Illness: James G Meese is a 71 y.o. male with history of DAGMAR on CPAP intermittently, HTN, who presents today with recently identified PSA. Recently presented to PCP with anxiety, states he has not been following with PCP routinely. PSA was checked as part of annual workup.     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 Reports stress causes anxiety related to managing other people's money/company's money. Has not previously seen a therapist/counselor, not interested in referral.     Lab Results   Component Value Date    PSA 6.790 (H) 05/10/2022    PSA 3.200 2017        Prostate Biopsy Collaborative Group (PBCG) Risk Calculator:        Denies family history of prostate cancer.     Denies urinary complaints, IPSS score is 0, denies nocturia, urgency, frequency, or weak stream.     Erections - ISA 25, denies prior PDE5 inhibitor use.     Prior abdominal surgery - MOHS for SCC face, appendectomy ,  Right inguinal hernia repair with mesh (open).     Subjective      Review of System: Review of Systems   Constitutional: Negative for chills, fatigue, fever and unexpected weight change.   HENT: Negative for sore throat.    Eyes: Negative for visual disturbance.   Respiratory: Negative for cough, chest tightness and shortness of breath.    Cardiovascular: Negative for chest pain and leg swelling.   Gastrointestinal: Negative for blood in stool, constipation, diarrhea, nausea, rectal pain and vomiting.   Genitourinary: Negative for decreased urine volume, difficulty urinating, dysuria, enuresis, flank pain, frequency, genital sores, hematuria and urgency.   Musculoskeletal: Negative for back pain and joint swelling.   Skin: Negative for rash  and wound.   Neurological: Negative for seizures, speech difficulty, weakness and headaches.   Psychiatric/Behavioral: Negative for confusion, sleep disturbance and suicidal ideas. The patient is not nervous/anxious.       I have reviewed the ROS documented by my clinical staff, I have updated appropriately and I agree. Mushtaq Rudolph MD    Past Medical History:   Past Medical History:   Diagnosis Date   • Basal cell carcinoma (BCC) of skin of nose     Dx - s/p Mohs Surgery   • History of varicella    • Hypertension     Dx    • Melanoma in situ of torso excluding breast (HCC)     Dx    • Obstructive sleep apnea syndrome     Description: dx 9/10-mild   • Osteoarthritis     Description: mild   • SCC (squamous cell carcinoma), face     Dx - right sideburn    • Simple renal cyst     Dx 10/7/15 by u/s (bilateral)       Past Surgical History:   Past Surgical History:   Procedure Laterality Date   • APPENDECTOMY     • INGUINAL HERNIA REPAIR Right 2013    with mesh   • MOHS SURGERY  2020    nasal BCC       Family History:   Family History   Problem Relation Age of Onset   • Hypertension Mother          age 97   • Osteoarthritis Mother    • No Known Problems Father          age 94 sepsis       Social History:   Social History     Socioeconomic History   • Marital status:    • Number of children: 1   Tobacco Use   • Smoking status: Never Smoker   • Smokeless tobacco: Never Used   Vaping Use   • Vaping Use: Never used   Substance and Sexual Activity   • Alcohol use: Yes     Comment: 1 beer per week   • Drug use: Never   • Sexual activity: Yes     Partners: Female       Medications:     Current Outpatient Medications:   •  hydroCHLOROthiazide (HYDRODIURIL) 25 MG tablet, Take 1 tablet by mouth Daily., Disp: 30 tablet, Rfl: 5  •  aspirin 81 MG EC tablet, Take 81 mg by mouth Daily., Disp: , Rfl:     Allergies:   No Known Allergies    IPSS Questionnaire (AUA-7):  Over the past month…     1)  Incomplete Emptying:       How often have you had a sensation of not emptying you had the sensation of not emptying your bladder completely after you finished urinating?  0 - Not at all   2)  Frequency:       How often have you had the urinate again less than two hours after you finished urinating?  0 - Not at all   3)  Intermittency:       How often have you found you stopped and started again several times when you urinated?   0 - Not at all   4) Urgency:      How often have you found it difficult to postpone urination?  0 - Not at all   5) Weak Stream:      How often have you had a weak urinary stream?  0 - Not at all   6) Straining:       How often have you had to push or strain to begin urination?  0 - Not at all   7) Nocturia:      How many times did you most typically get up to urinate from the time you went to bed at night until the time you got up in the morning?  0 - None   Total Score:  0   The International Prostate Symptom Score (IPSS) is used to screen, diagnose, track symptoms of benign prostatic hyperplasia (BPH).   0-7 (Mild Symptoms) 8-19 (Moderate) 20-35 (Severe)   Quality of Life (QoL):  If you were to spend the rest of your life with your urinary condition just the way it is now, how would you feel about that? 0-Delighted and 1-Pleased   Urine Leakage (Incontinence) 0-No Leakage       Sexual Health Inventory for Men (ISA)   Over the past 6 months:     1. How do you rate your confidence that you could get and keep an erection?  5 - Very high    2. When you had erections with sexual    stimulation, how often were your erections hard enough for penetration (entering your partner)?  5 - Almost always or always    3. During sexual intercourse, how often were you able to maintain your erection after you had penetrated (entered) your partner?  5 - Almost always or always    4. During sexual intercourse, how difficult was it to maintain your erection to completion of intercourse?  5 - Not  "difficult    5. When you attempted sexual intercourse, how often was it satisfactory for you?  5 - Almost always or always     Total Score: 25   The Sexual Health Inventory for Men further classifies ED severity with the following breakpoints:   1-7 (Severe ED) 8-11 (Moderate ED) 12-16 (Mild to Moderate ED) 17-21 (Mild ED)      Post void residual bladder scan:   38 mL       Objective     Physical Exam:   Vital Signs:   Vitals:    06/15/22 0804   BP: 138/78   Pulse: 76   SpO2: 97%   Weight: 76.7 kg (169 lb)   Height: 175.3 cm (69\")     Body mass index is 24.96 kg/m².     Physical Exam  Vitals and nursing note reviewed.   Constitutional:       Appearance: Normal appearance.   HENT:      Head: Normocephalic and atraumatic.      Mouth/Throat:      Mouth: Mucous membranes are moist.      Pharynx: Oropharynx is clear.   Eyes:      Extraocular Movements: Extraocular movements intact.      Conjunctiva/sclera: Conjunctivae normal.   Cardiovascular:      Rate and Rhythm: Normal rate and regular rhythm.   Pulmonary:      Effort: Pulmonary effort is normal. No respiratory distress.   Abdominal:      Palpations: Abdomen is soft.      Tenderness: There is no abdominal tenderness. There is no right CVA tenderness or left CVA tenderness.   Genitourinary:     Comments:  Circumcised phallus, orthotopic meatus, bilaterally descended testicles without masses, or lesions.     AUSTYN: Normal rectal tone, no blood, estimated 50 gram prostate without nodularity or firmness.   Musculoskeletal:         General: Normal range of motion.      Cervical back: Normal range of motion.   Skin:     General: Skin is warm and dry.   Neurological:      General: No focal deficit present.      Mental Status: He is alert and oriented to person, place, and time.   Psychiatric:         Mood and Affect: Mood normal.         Behavior: Behavior normal.         Labs:   Hematocrit (%)   Date Value   05/10/2022 50.9   03/14/2017 51.9 (H)   09/29/2015 48.5       Lab " "Results   Component Value Date    PSA 6.790 (H) 05/10/2022    PSA 3.200 03/14/2017       Images:   No Images in the past 120 days found..    Measures:   Tobacco:   James G Meese  reports that he has never smoked. He has never used smokeless tobacco.            Assessment / Plan      Assessment/Plan:   Mr. Meese is a 71 y.o. male with elevated PSA.    I had a detailed discussion with the patient today regarding prostate-specific antigen (PSA) and prostate cancer screening.  We discussed that PSA is an imperfect screening test and that it can be elevated for a variety of reasons including infection, inflammation, as a function of the prostate volume, and due to malignancy.  We discussed how prostate cancer is the most commonly diagnosed malignancy among men and becomes more prevalent at advanced age.  We discussed how patients with a family history of prostate cancer are at an increased risk of developing prostate cancer over the general population.  I counseled the patient that the American Urological Association guidelines recommend PSA screening in men aged 55 through 70, or in some instances at an earlier age if positive family history for prostate cancer.  I discussed how screening men older than 70 years old is not recommended, unless a patient has a greater than 10-year life expectancy, is in good health, and is personally motivated to rule out prostate cancer; this is due to the fact that most men older than 70, in poor health, and in those men with less than 10-year life expectancy will likely die of unrelated causes not associated with prostate cancer.  We talked about how prostate cancer is typically a slow-growing malignancy, but identifying intermediate or high risk prostate cancers that need prompt treatment is the ultimate goal of PSA and prostate cancer screening.  I discussed with this patient that there is no \"normal\" PSA range despite the fact that most labs indicate a \"normal\" PSA range from 0 to 4 " ng/dL.  There are age-adjusted PSA ranges that are also taken into account in the setting of slightly elevated PSA in the older male.  Besides age-adjusted ranges, calculating a patient's prostate volume to determine PSA density (<0.15 has a lower risk of harboring malignancy), calculating the patient's PSA velocity or doubling time, and evaluating free PSA versus total PSA values can help guide our decision making.  I also discussed the possibility of performing adjunctive blood tests as well, my preference is to perform a 4K score which can provide a percentage risk of harboring intermediate or high risk prostate cancers that may need treatment.  This is sometimes beneficial in assisting with decision-making in patients who are hesitant to undergo prostate biopsy.  I also discussed my goal is to work-up the appropriate patient for elevated PSA as prostate biopsy and work-up for prostate cancer has inherent risks and potential harms.  The risk of prostate biopsy related sepsis is 4% for all comers.    In short, I discussed that PSA screening and work-up for prostate cancer should only ever occur after shared decision making conversation between the physician and patient.  Patient reports understanding.    Discussion Summary  Extended PBCG risk calculation demonstrates 45% chance of high-grade prostate cancer on biopsy.  For better risk characterization patient elects to proceed with 4K score, I will call him with result.  We will determine possible upfront MRI prostate or upfront biopsy or continued PSA monitoring based on results.    Diagnoses and all orders for this visit:    1. Elevated PSA    - 4k score today  - will call with results, possible monitoring vs MRI prostate vs upfront biopsy pending results    -     POC Urinalysis Dipstick, Automated        Follow Up:   No follow-ups on file.    I spent approximately 30 minutes providing clinical care for this patient; including review of patient's chart and  provider documentation, face to face time spent with patient in examination room (obtaining history, performing physical exam, discussing diagnosis and management options), placing orders, and completing patient documentation.     Mushtaq Rudolph MD  [facility] [place]

## 2022-06-20 ENCOUNTER — TELEPHONE (OUTPATIENT)
Dept: UROLOGY | Facility: CLINIC | Age: 72
End: 2022-06-20

## 2022-06-20 DIAGNOSIS — R97.20 ELEVATED PROSTATE SPECIFIC ANTIGEN (PSA): Primary | ICD-10-CM

## 2022-06-20 RX ORDER — CIPROFLOXACIN 500 MG/1
500 TABLET, FILM COATED ORAL 2 TIMES DAILY
Qty: 6 TABLET | Refills: 0 | Status: SHIPPED | OUTPATIENT
Start: 2022-06-27 | End: 2022-06-30

## 2022-06-20 NOTE — TELEPHONE ENCOUNTER
Call patient back, 4K score resulted at 58%, PSA resulted 7.9, continues to rise.  Discussed significant increased risk of undiagnosed prostate cancer.  Recommended proceeding with prostate biopsy to which he is amenable.  Discussed risks including bleeding in stool, urine, ejaculate, sepsis in 2 to 4%.  We will prescribe him ciprofloxacin 500 mg twice daily to start on 6- with plans for biopsy in 6/.  All patient's questions answered.    PLAN  -Transrectal ultrasound-guided prostate biopsy on Tuesday, 6/ in my clinic afternoon  - call him to schedule     Mushtaq Rudolph MD

## 2022-06-28 ENCOUNTER — PROCEDURE VISIT (OUTPATIENT)
Dept: UROLOGY | Facility: CLINIC | Age: 72
End: 2022-06-28

## 2022-06-28 VITALS
OXYGEN SATURATION: 96 % | WEIGHT: 169 LBS | HEIGHT: 69 IN | SYSTOLIC BLOOD PRESSURE: 144 MMHG | BODY MASS INDEX: 25.03 KG/M2 | HEART RATE: 85 BPM | DIASTOLIC BLOOD PRESSURE: 78 MMHG

## 2022-06-28 DIAGNOSIS — R97.20 ELEVATED PROSTATE SPECIFIC ANTIGEN (PSA): Primary | ICD-10-CM

## 2022-06-28 DIAGNOSIS — R97.20 ELEVATED PSA: ICD-10-CM

## 2022-06-28 PROCEDURE — 76942 ECHO GUIDE FOR BIOPSY: CPT | Performed by: STUDENT IN AN ORGANIZED HEALTH CARE EDUCATION/TRAINING PROGRAM

## 2022-06-28 PROCEDURE — 55700 PR PROSTATE NEEDLE BIOPSY ANY APPROACH: CPT | Performed by: STUDENT IN AN ORGANIZED HEALTH CARE EDUCATION/TRAINING PROGRAM

## 2022-06-28 NOTE — PROGRESS NOTES
Preprocedure diagnosis  Elevated PSA    Postprocedure diagnosis  Elevated PSA    Procedure  Transrectal ultrasound-guided prostate biopsy, local prostate block with 1% lidocaine injection    Attending surgeon  Mushtaq Rudolph MD    Anesthesia  1% Lidocaine prostate block    Complications  None    Indications  71 y.o. malewho has a history of elevated PSA who presents today for transrectal ultrasound-guided prostate biopsy after discussion of risks, benefits, alternatives.  Informed consent was obtained.  Patient has taken his ciprofloxacin pre-procedurally and completed an enema the night before his biopsy.    Underwent 4K score - 58%    Findings  -Digital rectal examination = No nodules or induration  -Prostate volume measurements = 42 cc volume  -PSA density = 0.159  -Total number of biopsy cores= 13    Lab Results   Component Value Date    PSA 6.790 (H) 05/10/2022    PSA 3.200 03/14/2017         Procedure   The patient was positioned and prepped in a left lateral position with lower extremities flexed.       A digital rectal exam was performed identifying a benign feeling gland, moderately enlarged.     The rectal ultrasound probe was slowly introduced into the rectum without difficulty.  The prostate and seminal vesicles were inspected systematically using axial and sagittal views with the ultrasound.      The dimensions of the prostate were measured, for a calculated volume of 42 mL, PSA Density 0.159.      Next 5 cc of 1% lidocaine was injected at the right and left neurovascular bundles at the junction of the seminal vesicles bilaterally.  Next using a true cut biopsy needle, 13 prostate cores were collected. The specific locations were the following: left lateral base, left lateral mid, left lateral apex, left medial base, left medial mid, and left medial apex, right lateral base, right lateral mid, right lateral apex, right medial base, right medial mid, right medial apex, and right and left transition  zones.  The rectal ultrasound probe was held in place for 2 minutes for hemostasis.  The rectal ultrasound probe was removed.  A AUSTYN was again performed revealing little blood in the rectum.  The patient tolerated the procedure well.     Disposition/Follow Up:     1.  The patient was instructed to drink plenty of fluids and warned about possible complications and side effects including, but not limited to, blood in the urine, stool and semen as well as bloodstream infection.  He was instructed to call the office if there are any issues, especially fevers or flu-like symptoms.    2.  Continue antibiotic for a total of 3 days.  3.  Call the patient to discuss pathology results and next steps    Mushtaq Rudolph MD  [facility]

## 2022-07-05 ENCOUNTER — TELEPHONE (OUTPATIENT)
Dept: UROLOGY | Facility: CLINIC | Age: 72
End: 2022-07-05

## 2022-07-05 NOTE — TELEPHONE ENCOUNTER
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  Patient likely has a large left-sided tumor.  Discussed the indications for treatment.  Discussed plan to sit down in my clinic within the next week or so to discuss treatment options including the differences between robotic surgery for prostate removal versus radiation therapy.  Patient is amenable to this plan.        PLAN  - return to clinic in 7-10 days to discuss prostate cancer treatment options, need 30 min appt    Mushtaq Rudolph MD

## 2022-07-06 NOTE — TELEPHONE ENCOUNTER
Breanna, please your assistance in scheduling pt for 07/14/22, Dr. Rudolph needs a 30 minute appointment to discuss prostate cancer treatment options.

## 2022-07-08 ENCOUNTER — TELEPHONE (OUTPATIENT)
Dept: UROLOGY | Facility: CLINIC | Age: 72
End: 2022-07-08

## 2022-07-14 ENCOUNTER — OFFICE VISIT (OUTPATIENT)
Dept: UROLOGY | Facility: CLINIC | Age: 72
End: 2022-07-14

## 2022-07-14 VITALS
DIASTOLIC BLOOD PRESSURE: 78 MMHG | BODY MASS INDEX: 25.03 KG/M2 | HEIGHT: 69 IN | WEIGHT: 169 LBS | SYSTOLIC BLOOD PRESSURE: 124 MMHG | OXYGEN SATURATION: 96 % | HEART RATE: 76 BPM

## 2022-07-14 DIAGNOSIS — C61 PROSTATE CANCER: Primary | ICD-10-CM

## 2022-07-14 PROCEDURE — 99215 OFFICE O/P EST HI 40 MIN: CPT | Performed by: STUDENT IN AN ORGANIZED HEALTH CARE EDUCATION/TRAINING PROGRAM

## 2022-07-14 NOTE — PROGRESS NOTES
Follow Up Office Visit      Patient Name: James G Meese  : 1950   MRN: [id number]     Chief Complaint:    Chief Complaint   Patient presents with   • Prostate Cancer Treatment Discussion       Referring Provider: No ref. provider found    History of Present Illness: James G Meese is a 71 y.o. male who presents today for follow up of recently diagnosed unfavorable intermediate risk prostate cancer.    The patient has a past medical historyof DAGMAR on CPAP intermittently, HTN, prior open right inguinal hernia repair with mesh, who was originally evaluated in my clinic on 6/ for history of elevated PSA.  PSA original visit was 6.79, up from 3.2 in 2017.  Extended PBCG risk calculation demonstrated 45% chance of high-grade prostate cancer biopsy.  For further risk characterization he completed a 4K score which came back significantly elevated at 58%.    He was taken to the procedure room for prostate biopsy on 2022, this demonstrated 6 out of 13 cores positive for Lopez 3+4 equal 7, grade group 2, intermediate risk prostate cancer involving the entirety of the left side of his prostate.  Right side was benign on biopsy.  The patient returns today for discussion of treatment options.  Patient has no significant issues since prostate biopsy, denies blood in urine, stool or ejaculate this time.  Denies fevers or chills at home.  Otherwise has recovered well.              Patient reports no significant urinary issues, he has no significant erectile dysfunction at this time.  He is not on Cialis or Viagra.  He and his wife have not had intercourse in a while.    IPSS score today is 3.  Bernabe score is 19.    Prior abdominal surgery includes open right inguinal hernia repair and a remote appendectomy.      Subjective      Review of System: Review of Systems   Constitutional: Negative for chills, fatigue, fever and unexpected weight change.   HENT: Negative for sore throat.    Eyes: Negative for visual  disturbance.   Respiratory: Negative for cough, chest tightness and shortness of breath.    Cardiovascular: Negative for chest pain and leg swelling.   Gastrointestinal: Negative for blood in stool, constipation, diarrhea, nausea, rectal pain and vomiting.   Genitourinary: Negative for decreased urine volume, difficulty urinating, dysuria, enuresis, flank pain, frequency, genital sores, hematuria and urgency.   Musculoskeletal: Negative for back pain and joint swelling.   Skin: Negative for rash and wound.   Neurological: Negative for seizures, speech difficulty, weakness and headaches.   Psychiatric/Behavioral: Negative for confusion, sleep disturbance and suicidal ideas. The patient is not nervous/anxious.       I have reviewed the ROS documented by my clinical staff, I have updated appropriately and I agree. Mushtaq Rudolph MD    I have reviewed and the following portions of the patient's history were updated as appropriate: past family history, past medical history, past social history, past surgical history and problem list.    Medications:     Current Outpatient Medications:   •  hydroCHLOROthiazide (HYDRODIURIL) 25 MG tablet, Take 1 tablet by mouth Daily., Disp: 30 tablet, Rfl: 5    Allergies:   No Known Allergies    IPSS Questionnaire (AUA-7):  Over the past month…    1)  Incomplete Emptying:       How often have you had a sensation of not emptying you had the sensation of not emptying your bladder completely after you finished urinating?  0 - Not at all   2)  Frequency:       How often have you had the urinate again less than two hours after you finished urinating?  1 - Less than 1 time in 5   3)  Intermittency:       How often have you found you stopped and started again several times when you urinated?   0 - Not at all   4) Urgency:      How often have you found it difficult to postpone urination?  1 - Less than 1 time in 5   5) Weak Stream:      How often have you had a weak urinary stream?  0 - Not  "at all   6) Straining:       How often have you had to push or strain to begin urination?  0 - Not at all   7) Nocturia:      How many times did you most typically get up to urinate from the time you went to bed at night until the time you got up in the morning?  1 - 1 time   Total Score:  3   The International Prostate Symptom Score (IPSS) is used to screen, diagnose, track symptoms of benign prostatic hyperplasia (BPH).   0-7 (Mild Symptoms) 8-19 (Moderate) 20-35 (Severe)   Quality of Life (QoL):  If you were to spend the rest of your life with your urinary condition just the way it is now, how would you feel about that? 1-Pleased   Urine Leakage (Incontinence) 0-No Leakage     Sexual Health Inventory for Men (ISA)   Over the past 6 months:     1. How do you rate your confidence that you could get and keep an erection?  3 - Moderate   2. When you had erections with sexual  stimulation, how often were your erections hard enough for penetration (entering your partner)?  4 - Most times ( much more than, half the time)   3. During sexual intercourse, how often were you able to maintain your erection after you had penetrated (entered) your partner?  4 - Most times ( much more than, half the time)   4. During sexual intercourse, how difficult was it to maintain your erection to completion of intercourse?  4 - Sightly difficult    5. When you attempted sexual intercourse, how often was it satisfactory for you?  4 - Most times ( much more than, half the time)    Total Score: 19   The Sexual Health Inventory for Men further classifies ED severity with the following breakpoints:   1-7 (Severe ED) 8-11 (Moderate ED) 12-16 (Mild to Moderate ED) 17-21 (Mild ED)           Objective     Physical Exam:   Vital Signs:   Vitals:    07/14/22 1224   BP: 124/78   Pulse: 76   SpO2: 96%   Weight: 76.7 kg (169 lb)   Height: 175.3 cm (69\")     Body mass index is 24.96 kg/m².     Physical Exam  Constitutional:       Appearance: Normal " appearance.   HENT:      Head: Normocephalic and atraumatic.      Nose: Nose normal.   Eyes:      Extraocular Movements: Extraocular movements intact.      Conjunctiva/sclera: Conjunctivae normal.      Pupils: Pupils are equal, round, and reactive to light.   Musculoskeletal:         General: Normal range of motion.      Cervical back: Normal range of motion and neck supple.   Skin:     General: Skin is warm and dry.      Findings: No lesion or rash.   Neurological:      General: No focal deficit present.      Mental Status: He is alert and oriented to person, place, and time. Mental status is at baseline.   Psychiatric:         Mood and Affect: Mood normal.         Behavior: Behavior normal.         Labs:   Brief Urine Lab Results  (Last result in the past 365 days)      Color   Clarity   Blood   Leuk Est   Nitrite   Protein   CREAT   Urine HCG        06/15/22 0818 Yellow   Clear   Negative   Negative   Negative   Negative                      Lab Results   Component Value Date    GLUCOSE 84 06/07/2022    CALCIUM 9.1 06/07/2022     06/07/2022    K 3.9 06/07/2022    CO2 26.8 06/07/2022     06/07/2022    BUN 16 06/07/2022    CREATININE 1.04 06/07/2022    EGFRIFNONA 75 03/14/2017    BCR 15.4 06/07/2022    ANIONGAP 10.2 06/07/2022       Lab Results   Component Value Date    WBC 6.33 05/10/2022    HGB 17.0 05/10/2022    HCT 50.9 05/10/2022    MCV 91.9 05/10/2022     05/10/2022       Images:   No Images in the past 120 days found..    Measures:   Tobacco:   James G Meese  reports that he has never smoked. He has never used smokeless tobacco.         Assessment / Plan      Assessment/Plan:   71 y.o. male who presented today for follow up of recently diagnosed unfavorable intermediate risk prostate cancer.  Patient diagnosed with Howe 3+4 equal 7 prostate cancer involving the entirety of the left prostate.  Right side is benign.  Based on volume of cancer he is considered unfavorable intermediate risk  "per NCCN guidelines.  He has not completed staging imaging with bone scan or CT at this time.    Patient will need to complete bone scan and CT abdomen pelvis with contrast and I will call him with results.     I discussed the Lopez score as well as \"Grade Group Scoring\" in detail with respect to their role in tumor biology and behavior.      I then discussed the treatment options for a man with UNFAVORABLE intermediate risk disease as outlined by the NCCN in whom at least 10 years of life expectancy is anticipated:    1.  Surgery with pelvic lymph node dissection +/- adjuvant therapies  2.  External beam radiotherapy or brachytherapy  3.  Active surveillance    Radiation  I had a brief discussion with the patient's about some of the pros and cons to radiation, pros would include lower upfront quality of life changes and reduced initial side effects associated with urinary incontinence and erectile dysfunction, however both of these are possible long-term.  We also discussed possibilities of bladder and rectal irritation with radiation.  I also briefly mentioned the difficulty associated with post radiation prostatectomy if the patient were to develop a recurrence after definitive treatment with radiation.  This is in contrast to upfront surgery with the ability to perform salvage or adjuvant radiation if the patient experiences biochemical recurrence after definitive therapy with surgery.  Patient expressed understanding.    As I do with all patients I  with newly diagnosed prostate cancer, I encouraged him to seek out further discussion with Radiation-Oncology for a more detailed discussion, and offered him a referral if he would like.     Surveillance  I do not recommend active surveillance for patients with high-volume grade group 2 prostate cancer.    Surgery  I reviewed the role of surgery and the relevant surgical anatomy and the role of the robotic platform.  I explained that surgery for prostate " "cancer exists on a continuum of quality of life outcomes and cancer control.  We reviewed that a maximal cancer surgery is also associated with maximal impacts on quality of life (erectile dysfunction and incontinence).  I explained that the approach utilized for the surgery is driven by his goals of cancer care and his particular pathology and staging.      I explained that incontinence after robotic or open prostatectomy is typically an inevitability, but usually improves within weeks after surgery, the majority of men are dry just a few months after surgery, but some may struggle with incontinence out to a year, and some men may still be dealing with incontinence after 12 months.  Greater than 90% of men will achieve continence at 1 year.  Failure to achieve continence after 12 months is considered abnormal, and patients are typically offered surgical treatment options to correct this if needed.    I reviewed that the rate of potency is dependent on baseline functional status and time.  Specifically if a bilateral nerve sparing procedure were performed in the setting of perfect erections pre-operatively, that we would expect roughly 85% of men to regain potency within 2 years of surgery; most of them beginning their recovery around 6-9 months from surgery.  Of these 85% of men, roughly 50% will require medications to support their erections long-term and that all men will initially require some degree of medication support.  Of the 15% of men who do not regain function, this will require either a vacuum erection device or injection therapy.  The trade-off is that with more preserved tissue there is a greater probability of positive surgical margins.  The presence of a margin would therefore be associated with an increased risk of recurrent disease and need for adjuvant and salvage therapies.       On the alternative end of the surgical spectrum we could proceed with with \"wide\" dissection bilaterally, which would " maximize the tissues removed.  This would result in longer time to continence and potency only achievable with injection or vacuum erection devices given removal of the nerves which are necessary for natural erections.  The rate of continence is the same 95% at 1 year, but it takes more time to achieve that result, with most men achieving continence around 6 months from surgery.  While this approach does not guarantee negative margins and a lack of recurrence, the rate of margins is lessened with the greater tissue removed.    We discussed the role of pelvic lymphadenectomy or removal of the pelvic lymph node tissues to assist with staging the patient and ruling out local prostate cancer spread, we also discussed that lymph node removal may offer cancer specific survival benefit in some men as well, by removing a potential source of microscopic cancer cells or potential sites of spread.  I described to the patient that I always remove lymph nodes during prostatectomy, and then I believe it gives us the best and most robust information in terms of his overall cancer staging, which may inform need for further treatment post prostatectomy if advanced disease or locally metastatic disease is found.    I also reviewed the specific procedural risks, which include, but are not limited to infection, bleeding, need for blood transfusion, and injury to surrounding structures including the rectum, small bowel, bladder, ureters, blood vessels, nerves specifically the obturator nerve.  I reviewed the general procedural risks of wound healing complications, wound infections, conversion to open procedure as well as termination of procedure, or need for additional procedures. We have discussed the risk of long term neuropraxia, air emboli, MI, DVT, PE, stroke, and death.      Survivorship   I then discussed survivorship care which consists of monitoring for recurrence and management of treatment related side effects.    I reviewed  how pathology dictates follow-up and risk of recurrence.  I explained that men with treated prostate cancer (surgery or radiation) are at risk of recurrence during follow-up and that historically up around 30% of men will require adjuvant treatments; often based on the post-surgical pathology.  I additionally reviewed how monitoring is performed to maximize the benefit of adjuvant therapies should they be required.      I provided the patient a copy of my prostate cancer packet and encouraged him to review it in detail as it reinforces and expands on the risks and benefits of each approach to managing prostatectomy.    Discussion summary  After completion of prolonged discussion today in clinic, patient is leaning towards robotic prostatectomy.  He would like to have his imaging for staging performed first and I will call him with results and he would like to make decisions about moving forward surgery at that time.  He is not interested in radiation oncology referral at this time.  This was offered to him at numerous points.      Diagnoses and all orders for this visit:    1. Prostate cancer (HCC) (Primary)    -Thorough discussion regarding treatment options for his unfavorable intermediate risk, high-volume prostate cancer performed today  -He is leaning towards robotic prostatectomy, discussed importantly that given benign tissue on right side we will be able to perform a right-sided nerve sparing with a left-sided wide dissection at time of his prostatectomy if he would like to move forward with this  -He will need to complete staging imaging with CT and bone scan in the interim and I will call him with results and he would like to discuss his decision regarding surgery at that time  -He is not interested in radiation oncology referral at this time    -     CT Abdomen Pelvis With Contrast; Future  -     NM Bone Scan Whole Body; Future       Follow Up:   No follow-ups on file.    I spent approximately 40 minutes  providing clinical care for this patient; including review of patient's chart and provider documentation, face to face time spent with patient in examination room (obtaining history, performing physical exam, discussing diagnosis and management options), placing orders, and completing patient documentation.     Mushtaq Rudolph MD  [facility] [place]

## 2022-07-18 ENCOUNTER — PREP FOR SURGERY (OUTPATIENT)
Dept: OTHER | Facility: HOSPITAL | Age: 72
End: 2022-07-18

## 2022-07-18 ENCOUNTER — TELEPHONE (OUTPATIENT)
Dept: UROLOGY | Facility: CLINIC | Age: 72
End: 2022-07-18

## 2022-07-18 DIAGNOSIS — C61 PROSTATE CANCER: Primary | ICD-10-CM

## 2022-07-18 RX ORDER — CEFAZOLIN SODIUM 2 G/100ML
2 INJECTION, SOLUTION INTRAVENOUS ONCE
Status: CANCELLED | OUTPATIENT
Start: 2022-07-18 | End: 2022-07-18

## 2022-07-18 RX ORDER — SCOLOPAMINE TRANSDERMAL SYSTEM 1 MG/1
1 PATCH, EXTENDED RELEASE TRANSDERMAL CONTINUOUS
Status: CANCELLED | OUTPATIENT
Start: 2022-07-18 | End: 2022-07-21

## 2022-07-18 RX ORDER — ACETAMINOPHEN 500 MG
1000 TABLET ORAL ONCE
Status: CANCELLED | OUTPATIENT
Start: 2022-07-18 | End: 2022-07-18

## 2022-07-18 RX ORDER — MELOXICAM 15 MG/1
15 TABLET ORAL ONCE
Status: CANCELLED | OUTPATIENT
Start: 2022-07-18 | End: 2022-07-18

## 2022-07-18 RX ORDER — GABAPENTIN 300 MG/1
600 CAPSULE ORAL ONCE
Status: CANCELLED | OUTPATIENT
Start: 2022-07-18 | End: 2022-07-18

## 2022-07-18 NOTE — TELEPHONE ENCOUNTER
Patient would like to proceed with robotic prostatectomy given his recently diagnosed prostate cancer.  He is scheduled for CT and bone scan for complete staging 8-2-2022.  I will plan to call the patient with results of imaging to ensure that he is a candidate for surgery.  Earliest date for robotic surgery, we will tentatively put him on 8-26/2022, Friday for robotic assisted laparoscopic prostatectomy, right nerve sparing, left wide dissection, bilateral pelvic lymph node dissection.    PLAN  - RALP, RNS, LWD, BPLND 8/26/22   - call with staging imaging results 8/2/22     Mushtaq Rudolph MD

## 2022-07-22 ENCOUNTER — TELEPHONE (OUTPATIENT)
Dept: UROLOGY | Facility: CLINIC | Age: 72
End: 2022-07-22

## 2022-07-22 LAB — REF LAB TEST METHOD: NORMAL

## 2022-07-22 NOTE — TELEPHONE ENCOUNTER
Downloaded Oncotype report from portal, will be scanned to patient's chart.    Dr. Rudolph, left a copy of Oncotype report for you on your desk.    Report will be scanned to chart next week.

## 2022-08-02 ENCOUNTER — HOSPITAL ENCOUNTER (OUTPATIENT)
Dept: NUCLEAR MEDICINE | Facility: HOSPITAL | Age: 72
Discharge: HOME | End: 2022-08-02

## 2022-08-02 ENCOUNTER — HOSPITAL ENCOUNTER (OUTPATIENT)
Dept: CT IMAGING | Facility: HOSPITAL | Age: 72
Discharge: HOME | End: 2022-08-02
Admitting: STUDENT IN AN ORGANIZED HEALTH CARE EDUCATION/TRAINING PROGRAM

## 2022-08-02 DIAGNOSIS — C61 PROSTATE CANCER: ICD-10-CM

## 2022-08-02 PROCEDURE — 74177 CT ABD & PELVIS W/CONTRAST: CPT

## 2022-08-02 PROCEDURE — 0 TECHNETIUM MEDRONATE KIT: Performed by: STUDENT IN AN ORGANIZED HEALTH CARE EDUCATION/TRAINING PROGRAM

## 2022-08-02 PROCEDURE — 25010000002 IOPAMIDOL 61 % SOLUTION: Performed by: STUDENT IN AN ORGANIZED HEALTH CARE EDUCATION/TRAINING PROGRAM

## 2022-08-02 PROCEDURE — A9503 TC99M MEDRONATE: HCPCS | Performed by: STUDENT IN AN ORGANIZED HEALTH CARE EDUCATION/TRAINING PROGRAM

## 2022-08-02 PROCEDURE — 78306 BONE IMAGING WHOLE BODY: CPT

## 2022-08-02 RX ORDER — TC 99M MEDRONATE 20 MG/10ML
25.9 INJECTION, POWDER, LYOPHILIZED, FOR SOLUTION INTRAVENOUS
Status: COMPLETED | OUTPATIENT
Start: 2022-08-02 | End: 2022-08-02

## 2022-08-02 RX ADMIN — Medication 25.9 MILLICURIE: at 10:15

## 2022-08-02 RX ADMIN — IOPAMIDOL 85 ML: 612 INJECTION, SOLUTION INTRAVENOUS at 10:29

## 2022-08-04 ENCOUNTER — TELEPHONE (OUTPATIENT)
Dept: UROLOGY | Facility: CLINIC | Age: 72
End: 2022-08-04

## 2022-08-04 DIAGNOSIS — M89.9 BONE LESION: Primary | ICD-10-CM

## 2022-08-04 NOTE — TELEPHONE ENCOUNTER
Caller: TERRENCE     Relationship: WIFE    Best call back number: [phone]    What was the call regarding: WIFE IS CALLING TO GET THE TEST RESULTS OF THE CT & BONE SCAN THAT THE PT HAD DONE ON 7.29.22. SHE IS STATING THAT THEY SAW IN MY CHART THEY HE HAS PROSTATE CANCER AND SHE NEEDS TO KNOW IF THE SURGERY ON THE 26 TH IS GOING TO STILL BE DONE OR WILL HE HAVE TO GO THRU RADIATION IF ITS SPREAD. THEY ARE WANTING A CALL BACK ASAP.     Do you require a callback: YES

## 2022-08-04 NOTE — TELEPHONE ENCOUNTER
Patient went to CT scan which was negative for any acute abnormalities.  He underwent a bone scan which demonstrates possible lesion at cervical spine and sternomanubrial joint.  Radiology is suggesting CT chest for further characterization.  Please schedule this patient for CT chest within the next week, he has a prostatectomy plan on 8-.  Discussed the risk of this representing metastatic prostate cancer is extremely low with no pelvic lymphadenopathy and PSA only 6.7.  Patient reports understanding.  We will get a CT chest to go from there.    PLAN  -CT chest ASAP, call patient to schedule  - order placed     Mushtaq Rudolph MD

## 2022-08-12 ENCOUNTER — HOSPITAL ENCOUNTER (OUTPATIENT)
Dept: CT IMAGING | Facility: HOSPITAL | Age: 72
Discharge: HOME | End: 2022-08-12
Admitting: STUDENT IN AN ORGANIZED HEALTH CARE EDUCATION/TRAINING PROGRAM

## 2022-08-12 DIAGNOSIS — M89.9 BONE LESION: ICD-10-CM

## 2022-08-12 LAB — CREAT BLDA-MCNC: 1.2 MG/DL (ref 0.6–1.3)

## 2022-08-12 PROCEDURE — 71260 CT THORAX DX C+: CPT

## 2022-08-12 PROCEDURE — 82565 ASSAY OF CREATININE: CPT

## 2022-08-12 PROCEDURE — 25010000002 IOPAMIDOL 61 % SOLUTION: Performed by: STUDENT IN AN ORGANIZED HEALTH CARE EDUCATION/TRAINING PROGRAM

## 2022-08-12 RX ADMIN — IOPAMIDOL 85 ML: 612 INJECTION, SOLUTION INTRAVENOUS at 16:43

## 2022-08-15 NOTE — PROGRESS NOTES
CT chest negative for metastatic lesions. Called patient and discussed, proceed with robotic prostatectomy 8/26

## 2022-08-24 ENCOUNTER — PRE-ADMISSION TESTING (OUTPATIENT)
Dept: PREADMISSION TESTING | Facility: HOSPITAL | Age: 72
End: 2022-08-24

## 2022-08-24 VITALS — WEIGHT: 169.97 LBS | HEIGHT: 69 IN | BODY MASS INDEX: 25.18 KG/M2

## 2022-08-24 LAB
DEPRECATED RDW RBC AUTO: 39.6 FL (ref 37–54)
ERYTHROCYTE [DISTWIDTH] IN BLOOD BY AUTOMATED COUNT: 11.9 % (ref 12.3–15.4)
HBA1C MFR BLD: 5.5 % (ref 4.8–5.6)
HCT VFR BLD AUTO: 50 % (ref 37.5–51)
HGB BLD-MCNC: 17.1 G/DL (ref 13–17.7)
MCH RBC QN AUTO: 31.1 PG (ref 26.6–33)
MCHC RBC AUTO-ENTMCNC: 34.2 G/DL (ref 31.5–35.7)
MCV RBC AUTO: 90.9 FL (ref 79–97)
PLATELET # BLD AUTO: 260 10*3/MM3 (ref 140–450)
PMV BLD AUTO: 9.1 FL (ref 6–12)
POTASSIUM SERPL-SCNC: 4.1 MMOL/L (ref 3.5–5.2)
QT INTERVAL: 378 MS
QTC INTERVAL: 416 MS
RBC # BLD AUTO: 5.5 10*6/MM3 (ref 4.14–5.8)
WBC NRBC COR # BLD: 6.52 10*3/MM3 (ref 3.4–10.8)

## 2022-08-24 PROCEDURE — 93010 ELECTROCARDIOGRAM REPORT: CPT | Performed by: INTERNAL MEDICINE

## 2022-08-24 PROCEDURE — 36415 COLL VENOUS BLD VENIPUNCTURE: CPT

## 2022-08-24 PROCEDURE — 84132 ASSAY OF SERUM POTASSIUM: CPT

## 2022-08-24 PROCEDURE — 83036 HEMOGLOBIN GLYCOSYLATED A1C: CPT

## 2022-08-24 PROCEDURE — 93005 ELECTROCARDIOGRAM TRACING: CPT

## 2022-08-24 PROCEDURE — 85027 COMPLETE CBC AUTOMATED: CPT

## 2022-08-24 RX ORDER — DIPHENHYDRAMINE HCL 25 MG
25 CAPSULE ORAL NIGHTLY PRN
COMMUNITY

## 2022-08-24 NOTE — PAT
Patient to apply Chlorhexadine wipes  to surgical area (as instructed) the night before procedure and the AM of procedure. Wipes provided.    Patient instructed to drink 20 ounces (or until full) of Gatorade and it needs to be completed 1 hour (for Main OR patients) or 2 hours (scheduled  section patients) before given arrival time for procedure (NO RED Gatorade)    Patient verbalized understanding.    Patient viewed general PAT education video as instructed in their preoperative information received from their surgeon.  Patient stated the general PAT education video was viewed in its entirety and survey completed.  Copies of [facility] general education handouts (Incentive Spirometry, Meds to Beds Program, Patient Belongings, Pre-op skin preparation instructions, Blood Glucose testing, Visitor policy, Surgery FAQ, Code H) distributed to patient if not printed. Education related to the PAT pass and skin preparation for surgery (if applicable) completed in PAT as a reinforcement to PAT education video. Patient instructed to return PAT pass provided today as well as completed skin preparation sheet (if applicable) on the day of procedure.     Additionally if patient had not viewed video yet but intended to view it at home or in our waiting area, then referred them to the handout with QR code/link provided during PAT visit.  Instructed patient to complete survey after viewing the video in its entirety.  Encouraged patient/family to read PAT general education handouts thoroughly and notify PAT staff with any questions or concerns. Patient verbalized understanding of all information and priority content.    ekg cleared per Dr Renner

## 2022-08-26 ENCOUNTER — HOSPITAL ENCOUNTER (OUTPATIENT)
Facility: HOSPITAL | Age: 72
Discharge: HOME | End: 2022-08-27
Attending: STUDENT IN AN ORGANIZED HEALTH CARE EDUCATION/TRAINING PROGRAM | Admitting: STUDENT IN AN ORGANIZED HEALTH CARE EDUCATION/TRAINING PROGRAM

## 2022-08-26 ENCOUNTER — ANESTHESIA (OUTPATIENT)
Dept: PERIOP | Facility: HOSPITAL | Age: 72
End: 2022-08-26

## 2022-08-26 ENCOUNTER — ANESTHESIA EVENT (OUTPATIENT)
Dept: PERIOP | Facility: HOSPITAL | Age: 72
End: 2022-08-26

## 2022-08-26 ENCOUNTER — ANESTHESIA EVENT CONVERTED (OUTPATIENT)
Dept: ANESTHESIOLOGY | Facility: HOSPITAL | Age: 72
End: 2022-08-26

## 2022-08-26 DIAGNOSIS — C61 PROSTATE CANCER: ICD-10-CM

## 2022-08-26 PROBLEM — D72.829 LEUKOCYTOSIS: Status: ACTIVE | Noted: 2022-08-26

## 2022-08-26 LAB
ANION GAP SERPL CALCULATED.3IONS-SCNC: 13 MMOL/L (ref 5–15)
BUN SERPL-MCNC: 18 MG/DL (ref 8–23)
BUN/CREAT SERPL: 17.8 (ref 7–25)
CALCIUM SPEC-SCNC: 8.6 MG/DL (ref 8.6–10.5)
CHLORIDE SERPL-SCNC: 101 MMOL/L (ref 98–107)
CO2 SERPL-SCNC: 25 MMOL/L (ref 22–29)
CREAT SERPL-MCNC: 1.01 MG/DL (ref 0.76–1.27)
D-LACTATE SERPL-SCNC: 2.6 MMOL/L (ref 0.5–2)
DEPRECATED RDW RBC AUTO: 38.6 FL (ref 37–54)
EGFRCR SERPLBLD CKD-EPI 2021: 80.5 ML/MIN/1.73
ERYTHROCYTE [DISTWIDTH] IN BLOOD BY AUTOMATED COUNT: 11.9 % (ref 12.3–15.4)
GLUCOSE SERPL-MCNC: 167 MG/DL (ref 65–99)
HCT VFR BLD AUTO: 43.3 % (ref 37.5–51)
HGB BLD-MCNC: 15 G/DL (ref 13–17.7)
MCH RBC QN AUTO: 30.8 PG (ref 26.6–33)
MCHC RBC AUTO-ENTMCNC: 34.6 G/DL (ref 31.5–35.7)
MCV RBC AUTO: 88.9 FL (ref 79–97)
PLATELET # BLD AUTO: 266 10*3/MM3 (ref 140–450)
PMV BLD AUTO: 9.1 FL (ref 6–12)
POTASSIUM SERPL-SCNC: 3.5 MMOL/L (ref 3.5–5.2)
PROCALCITONIN SERPL-MCNC: 0.07 NG/ML (ref 0–0.25)
RBC # BLD AUTO: 4.87 10*6/MM3 (ref 4.14–5.8)
SODIUM SERPL-SCNC: 139 MMOL/L (ref 136–145)
WBC NRBC COR # BLD: 15.07 10*3/MM3 (ref 3.4–10.8)

## 2022-08-26 PROCEDURE — 84145 PROCALCITONIN (PCT): CPT | Performed by: PHYSICIAN ASSISTANT

## 2022-08-26 PROCEDURE — 55866 LAPS SURG PRST8ECT RPBIC RAD: CPT | Performed by: STUDENT IN AN ORGANIZED HEALTH CARE EDUCATION/TRAINING PROGRAM

## 2022-08-26 PROCEDURE — 25010000002 DEXAMETHASONE SODIUM PHOSPHATE 10 MG/ML SOLUTION: Performed by: STUDENT IN AN ORGANIZED HEALTH CARE EDUCATION/TRAINING PROGRAM

## 2022-08-26 PROCEDURE — 88309 TISSUE EXAM BY PATHOLOGIST: CPT | Performed by: STUDENT IN AN ORGANIZED HEALTH CARE EDUCATION/TRAINING PROGRAM

## 2022-08-26 PROCEDURE — 99214 OFFICE O/P EST MOD 30 MIN: CPT | Performed by: PHYSICIAN ASSISTANT

## 2022-08-26 PROCEDURE — G0378 HOSPITAL OBSERVATION PER HR: HCPCS

## 2022-08-26 PROCEDURE — 80048 BASIC METABOLIC PNL TOTAL CA: CPT | Performed by: STUDENT IN AN ORGANIZED HEALTH CARE EDUCATION/TRAINING PROGRAM

## 2022-08-26 PROCEDURE — 25010000002 ONDANSETRON PER 1 MG: Performed by: NURSE ANESTHETIST, CERTIFIED REGISTERED

## 2022-08-26 PROCEDURE — 38571 LAPAROSCOPY LYMPHADENECTOMY: CPT | Performed by: UROLOGY

## 2022-08-26 PROCEDURE — 83605 ASSAY OF LACTIC ACID: CPT | Performed by: PHYSICIAN ASSISTANT

## 2022-08-26 PROCEDURE — 85027 COMPLETE CBC AUTOMATED: CPT | Performed by: STUDENT IN AN ORGANIZED HEALTH CARE EDUCATION/TRAINING PROGRAM

## 2022-08-26 PROCEDURE — 25010000002 PROPOFOL 10 MG/ML EMULSION: Performed by: ANESTHESIOLOGY

## 2022-08-26 PROCEDURE — 25010000002 CEFAZOLIN IN DEXTROSE 2-4 GM/100ML-% SOLUTION: Performed by: STUDENT IN AN ORGANIZED HEALTH CARE EDUCATION/TRAINING PROGRAM

## 2022-08-26 PROCEDURE — 25010000002 BUPRENORPHINE PER 0.1 MG: Performed by: STUDENT IN AN ORGANIZED HEALTH CARE EDUCATION/TRAINING PROGRAM

## 2022-08-26 PROCEDURE — 88305 TISSUE EXAM BY PATHOLOGIST: CPT | Performed by: STUDENT IN AN ORGANIZED HEALTH CARE EDUCATION/TRAINING PROGRAM

## 2022-08-26 PROCEDURE — 38571 LAPAROSCOPY LYMPHADENECTOMY: CPT | Performed by: STUDENT IN AN ORGANIZED HEALTH CARE EDUCATION/TRAINING PROGRAM

## 2022-08-26 PROCEDURE — 55866 LAPS SURG PRST8ECT RPBIC RAD: CPT | Performed by: UROLOGY

## 2022-08-26 PROCEDURE — 25010000002 DEXAMETHASONE PER 1 MG: Performed by: ANESTHESIOLOGY

## 2022-08-26 DEVICE — THE ECHELON, ECHELON ENDOPATH™ AND ECHELON FLEX™ FAMILIES OF ENDOSCOPIC LINEAR CUTTERS AND RELOADS ARE STERILE, SINGLE PATIENT USE INSTRUMENTS THAT SIMULTANEOUSLY CUT AND STAPLE TISSUE. THERE ARE SIX STAGGERED ROWS OF STAPLES, THREE ON EITHER SIDE OF THE CUT LINE. THE 45 MM INSTRUMENTS HAVE A STAPLE LINE THATIS APPROXIMATELY 45 MM LONG AND A CUT LINE THAT IS APPROXIMATELY 42 MM LONG. THE SHAFT CAN ROTATE FREELY IN BOTH DIRECTIONS AND AN ARTICULATION MECHANISM ON ARTICULATING INSTRUMENTS ENABLES BENDING THE DISTAL PORTIONOF THE SHAFT TO FACILITATE LATERAL ACCESS OF THE OPERATIVE SITE.THE INSTRUMENTS ARE SHIPPED WITHOUT A RELOAD AND MUST BE LOADED PRIOR TO USE. A STAPLE RETAINING CAP ON THE RELOAD PROTECTS THE STAPLE LEG POINTS DURING SHIPPING AND TRANSPORTATION. THE INSTRUMENTS’ LOCK-OUT FEATURE IS DESIGNED TO PREVENT A USED RELOAD FROM BEING REFIRED.
Type: IMPLANTABLE DEVICE | Site: PELVIS | Status: FUNCTIONAL
Brand: ECHELON ENDOPATH

## 2022-08-26 DEVICE — CLIP LIG HEMOLOK PA LG 6CT PRP: Type: IMPLANTABLE DEVICE | Site: PELVIS | Status: FUNCTIONAL

## 2022-08-26 RX ORDER — HYDROCHLOROTHIAZIDE 25 MG/1
25 TABLET ORAL DAILY
Status: DISCONTINUED | OUTPATIENT
Start: 2022-08-27 | End: 2022-08-27 | Stop reason: HOSPADM

## 2022-08-26 RX ORDER — SODIUM CHLORIDE 0.9 % (FLUSH) 0.9 %
10 SYRINGE (ML) INJECTION AS NEEDED
Status: DISCONTINUED | OUTPATIENT
Start: 2022-08-26 | End: 2022-08-26 | Stop reason: HOSPADM

## 2022-08-26 RX ORDER — GABAPENTIN 300 MG/1
600 CAPSULE ORAL ONCE
Status: COMPLETED | OUTPATIENT
Start: 2022-08-26 | End: 2022-08-26

## 2022-08-26 RX ORDER — BUPRENORPHINE HYDROCHLORIDE 0.32 MG/ML
INJECTION INTRAMUSCULAR; INTRAVENOUS
Status: COMPLETED | OUTPATIENT
Start: 2022-08-26 | End: 2022-08-26

## 2022-08-26 RX ORDER — ENOXAPARIN SODIUM 100 MG/ML
40 INJECTION SUBCUTANEOUS DAILY
Status: DISCONTINUED | OUTPATIENT
Start: 2022-08-27 | End: 2022-08-27 | Stop reason: HOSPADM

## 2022-08-26 RX ORDER — ACETAMINOPHEN 650 MG/1
650 SUPPOSITORY RECTAL EVERY 6 HOURS
Status: DISCONTINUED | OUTPATIENT
Start: 2022-08-26 | End: 2022-08-27 | Stop reason: HOSPADM

## 2022-08-26 RX ORDER — HYDROMORPHONE HYDROCHLORIDE 1 MG/ML
0.5 INJECTION, SOLUTION INTRAMUSCULAR; INTRAVENOUS; SUBCUTANEOUS
Status: DISCONTINUED | OUTPATIENT
Start: 2022-08-26 | End: 2022-08-27 | Stop reason: HOSPADM

## 2022-08-26 RX ORDER — ONDANSETRON 2 MG/ML
4 INJECTION INTRAMUSCULAR; INTRAVENOUS ONCE AS NEEDED
Status: DISCONTINUED | OUTPATIENT
Start: 2022-08-26 | End: 2022-08-26 | Stop reason: HOSPADM

## 2022-08-26 RX ORDER — ACETAMINOPHEN 325 MG/1
650 TABLET ORAL EVERY 6 HOURS
Status: DISCONTINUED | OUTPATIENT
Start: 2022-08-26 | End: 2022-08-27 | Stop reason: HOSPADM

## 2022-08-26 RX ORDER — ONDANSETRON 2 MG/ML
INJECTION INTRAMUSCULAR; INTRAVENOUS AS NEEDED
Status: DISCONTINUED | OUTPATIENT
Start: 2022-08-26 | End: 2022-08-26 | Stop reason: SURG

## 2022-08-26 RX ORDER — OXYBUTYNIN CHLORIDE 10 MG/1
10 TABLET, EXTENDED RELEASE ORAL DAILY
Status: DISCONTINUED | OUTPATIENT
Start: 2022-08-26 | End: 2022-08-27 | Stop reason: HOSPADM

## 2022-08-26 RX ORDER — GLYCOPYRROLATE 0.2 MG/ML
INJECTION INTRAMUSCULAR; INTRAVENOUS AS NEEDED
Status: DISCONTINUED | OUTPATIENT
Start: 2022-08-26 | End: 2022-08-26 | Stop reason: SURG

## 2022-08-26 RX ORDER — SODIUM CHLORIDE, SODIUM LACTATE, POTASSIUM CHLORIDE, CALCIUM CHLORIDE 600; 310; 30; 20 MG/100ML; MG/100ML; MG/100ML; MG/100ML
100 INJECTION, SOLUTION INTRAVENOUS CONTINUOUS
Status: DISCONTINUED | OUTPATIENT
Start: 2022-08-26 | End: 2022-08-27

## 2022-08-26 RX ORDER — MEPERIDINE HYDROCHLORIDE 25 MG/ML
12.5 INJECTION INTRAMUSCULAR; INTRAVENOUS; SUBCUTANEOUS
Status: DISCONTINUED | OUTPATIENT
Start: 2022-08-26 | End: 2022-08-26 | Stop reason: HOSPADM

## 2022-08-26 RX ORDER — ROCURONIUM BROMIDE 10 MG/ML
INJECTION, SOLUTION INTRAVENOUS AS NEEDED
Status: DISCONTINUED | OUTPATIENT
Start: 2022-08-26 | End: 2022-08-26 | Stop reason: SURG

## 2022-08-26 RX ORDER — GABAPENTIN 100 MG/1
100 CAPSULE ORAL 3 TIMES DAILY
Status: DISCONTINUED | OUTPATIENT
Start: 2022-08-26 | End: 2022-08-27 | Stop reason: HOSPADM

## 2022-08-26 RX ORDER — CEFAZOLIN SODIUM 2 G/100ML
2 INJECTION, SOLUTION INTRAVENOUS EVERY 8 HOURS
Status: DISCONTINUED | OUTPATIENT
Start: 2022-08-26 | End: 2022-08-27

## 2022-08-26 RX ORDER — HYDROMORPHONE HYDROCHLORIDE 1 MG/ML
0.5 INJECTION, SOLUTION INTRAMUSCULAR; INTRAVENOUS; SUBCUTANEOUS
Status: DISCONTINUED | OUTPATIENT
Start: 2022-08-26 | End: 2022-08-26 | Stop reason: HOSPADM

## 2022-08-26 RX ORDER — DEXAMETHASONE SODIUM PHOSPHATE 4 MG/ML
INJECTION, SOLUTION INTRA-ARTICULAR; INTRALESIONAL; INTRAMUSCULAR; INTRAVENOUS; SOFT TISSUE AS NEEDED
Status: DISCONTINUED | OUTPATIENT
Start: 2022-08-26 | End: 2022-08-26 | Stop reason: SURG

## 2022-08-26 RX ORDER — EPHEDRINE SULFATE 50 MG/ML
5 INJECTION, SOLUTION INTRAVENOUS ONCE AS NEEDED
Status: DISCONTINUED | OUTPATIENT
Start: 2022-08-26 | End: 2022-08-26 | Stop reason: HOSPADM

## 2022-08-26 RX ORDER — ONDANSETRON 2 MG/ML
4 INJECTION INTRAMUSCULAR; INTRAVENOUS EVERY 6 HOURS PRN
Status: DISCONTINUED | OUTPATIENT
Start: 2022-08-26 | End: 2022-08-27 | Stop reason: HOSPADM

## 2022-08-26 RX ORDER — ACETAMINOPHEN 500 MG
1000 TABLET ORAL ONCE
Status: COMPLETED | OUTPATIENT
Start: 2022-08-26 | End: 2022-08-26

## 2022-08-26 RX ORDER — NALOXONE HCL 0.4 MG/ML
0.4 VIAL (ML) INJECTION AS NEEDED
Status: DISCONTINUED | OUTPATIENT
Start: 2022-08-26 | End: 2022-08-26 | Stop reason: HOSPADM

## 2022-08-26 RX ORDER — OXYCODONE HYDROCHLORIDE 5 MG/1
5 TABLET ORAL EVERY 4 HOURS PRN
Status: DISCONTINUED | OUTPATIENT
Start: 2022-08-26 | End: 2022-08-27 | Stop reason: HOSPADM

## 2022-08-26 RX ORDER — MELOXICAM 15 MG/1
15 TABLET ORAL ONCE
Status: COMPLETED | OUTPATIENT
Start: 2022-08-26 | End: 2022-08-26

## 2022-08-26 RX ORDER — DIAPER,BRIEF,INFANT-TODD,DISP
1 EACH MISCELLANEOUS EVERY 12 HOURS SCHEDULED
Status: DISCONTINUED | OUTPATIENT
Start: 2022-08-26 | End: 2022-08-27 | Stop reason: HOSPADM

## 2022-08-26 RX ORDER — SODIUM CHLORIDE 0.9 % (FLUSH) 0.9 %
10 SYRINGE (ML) INJECTION EVERY 12 HOURS SCHEDULED
Status: CANCELLED | OUTPATIENT
Start: 2022-08-26

## 2022-08-26 RX ORDER — SODIUM CHLORIDE 9 MG/ML
INJECTION, SOLUTION INTRAVENOUS AS NEEDED
Status: DISCONTINUED | OUTPATIENT
Start: 2022-08-26 | End: 2022-08-26 | Stop reason: HOSPADM

## 2022-08-26 RX ORDER — LIDOCAINE HYDROCHLORIDE 10 MG/ML
0.5 INJECTION, SOLUTION EPIDURAL; INFILTRATION; INTRACAUDAL; PERINEURAL ONCE AS NEEDED
Status: COMPLETED | OUTPATIENT
Start: 2022-08-26 | End: 2022-08-26

## 2022-08-26 RX ORDER — BACITRACIN ZINC AND POLYMYXIN B SULFATE 500; 10000 [USP'U]/G; [USP'U]/G
OINTMENT OPHTHALMIC ONCE
Status: DISCONTINUED | OUTPATIENT
Start: 2022-08-26 | End: 2022-08-26 | Stop reason: HOSPADM

## 2022-08-26 RX ORDER — FENTANYL CITRATE 50 UG/ML
50 INJECTION, SOLUTION INTRAMUSCULAR; INTRAVENOUS
Status: DISCONTINUED | OUTPATIENT
Start: 2022-08-26 | End: 2022-08-26 | Stop reason: HOSPADM

## 2022-08-26 RX ORDER — BUPIVACAINE HYDROCHLORIDE 2.5 MG/ML
INJECTION, SOLUTION EPIDURAL; INFILTRATION; INTRACAUDAL
Status: COMPLETED | OUTPATIENT
Start: 2022-08-26 | End: 2022-08-26

## 2022-08-26 RX ORDER — DEXAMETHASONE SODIUM PHOSPHATE 10 MG/ML
INJECTION, SOLUTION INTRAMUSCULAR; INTRAVENOUS
Status: COMPLETED | OUTPATIENT
Start: 2022-08-26 | End: 2022-08-26

## 2022-08-26 RX ORDER — CEFAZOLIN SODIUM 2 G/100ML
2 INJECTION, SOLUTION INTRAVENOUS ONCE
Status: COMPLETED | OUTPATIENT
Start: 2022-08-26 | End: 2022-08-26

## 2022-08-26 RX ORDER — HEPARIN SODIUM 5000 [USP'U]/ML
5000 INJECTION, SOLUTION INTRAVENOUS; SUBCUTANEOUS ONCE
Status: DISCONTINUED | OUTPATIENT
Start: 2022-08-26 | End: 2022-08-26

## 2022-08-26 RX ORDER — FAMOTIDINE 20 MG/1
20 TABLET, FILM COATED ORAL
Status: COMPLETED | OUTPATIENT
Start: 2022-08-26 | End: 2022-08-26

## 2022-08-26 RX ORDER — SODIUM CHLORIDE, SODIUM LACTATE, POTASSIUM CHLORIDE, CALCIUM CHLORIDE 600; 310; 30; 20 MG/100ML; MG/100ML; MG/100ML; MG/100ML
9 INJECTION, SOLUTION INTRAVENOUS CONTINUOUS PRN
Status: DISCONTINUED | OUTPATIENT
Start: 2022-08-26 | End: 2022-08-26 | Stop reason: HOSPADM

## 2022-08-26 RX ORDER — ACETAMINOPHEN 160 MG/5ML
650 SOLUTION ORAL EVERY 6 HOURS
Status: DISCONTINUED | OUTPATIENT
Start: 2022-08-26 | End: 2022-08-27 | Stop reason: HOSPADM

## 2022-08-26 RX ORDER — MIDAZOLAM HYDROCHLORIDE 1 MG/ML
0.5 INJECTION INTRAMUSCULAR; INTRAVENOUS
Status: DISCONTINUED | OUTPATIENT
Start: 2022-08-26 | End: 2022-08-26 | Stop reason: HOSPADM

## 2022-08-26 RX ORDER — ONDANSETRON 4 MG/1
4 TABLET, FILM COATED ORAL EVERY 6 HOURS PRN
Status: DISCONTINUED | OUTPATIENT
Start: 2022-08-26 | End: 2022-08-27 | Stop reason: HOSPADM

## 2022-08-26 RX ORDER — HYOSCYAMINE SULFATE 0.125 MG
125 TABLET,DISINTEGRATING ORAL EVERY 4 HOURS PRN
Status: DISCONTINUED | OUTPATIENT
Start: 2022-08-26 | End: 2022-08-27 | Stop reason: HOSPADM

## 2022-08-26 RX ORDER — LIDOCAINE HYDROCHLORIDE 10 MG/ML
INJECTION, SOLUTION EPIDURAL; INFILTRATION; INTRACAUDAL; PERINEURAL AS NEEDED
Status: DISCONTINUED | OUTPATIENT
Start: 2022-08-26 | End: 2022-08-26 | Stop reason: SURG

## 2022-08-26 RX ORDER — BUPIVACAINE HCL/0.9 % NACL/PF 0.125 %
PLASTIC BAG, INJECTION (ML) EPIDURAL AS NEEDED
Status: DISCONTINUED | OUTPATIENT
Start: 2022-08-26 | End: 2022-08-26 | Stop reason: SURG

## 2022-08-26 RX ORDER — PROPOFOL 10 MG/ML
VIAL (ML) INTRAVENOUS AS NEEDED
Status: DISCONTINUED | OUTPATIENT
Start: 2022-08-26 | End: 2022-08-26 | Stop reason: SURG

## 2022-08-26 RX ORDER — EPHEDRINE SULFATE 50 MG/ML
INJECTION, SOLUTION INTRAVENOUS AS NEEDED
Status: DISCONTINUED | OUTPATIENT
Start: 2022-08-26 | End: 2022-08-26 | Stop reason: SURG

## 2022-08-26 RX ORDER — SODIUM CHLORIDE 9 MG/ML
100 INJECTION, SOLUTION INTRAVENOUS CONTINUOUS
Status: DISCONTINUED | OUTPATIENT
Start: 2022-08-26 | End: 2022-08-27 | Stop reason: HOSPADM

## 2022-08-26 RX ORDER — MAGNESIUM HYDROXIDE 1200 MG/15ML
LIQUID ORAL AS NEEDED
Status: DISCONTINUED | OUTPATIENT
Start: 2022-08-26 | End: 2022-08-26 | Stop reason: HOSPADM

## 2022-08-26 RX ADMIN — ACETAMINOPHEN 650 MG: 325 TABLET, FILM COATED ORAL at 20:58

## 2022-08-26 RX ADMIN — DEXAMETHASONE SODIUM PHOSPHATE 4 MG: 10 INJECTION, SOLUTION INTRAMUSCULAR; INTRAVENOUS at 13:07

## 2022-08-26 RX ADMIN — PROPOFOL 150 MG: 10 INJECTION, EMULSION INTRAVENOUS at 13:05

## 2022-08-26 RX ADMIN — GABAPENTIN 100 MG: 100 CAPSULE ORAL at 20:58

## 2022-08-26 RX ADMIN — Medication 100 MCG: at 14:01

## 2022-08-26 RX ADMIN — LIDOCAINE HYDROCHLORIDE 50 MG: 10 INJECTION, SOLUTION EPIDURAL; INFILTRATION; INTRACAUDAL; PERINEURAL at 13:05

## 2022-08-26 RX ADMIN — PROPOFOL 50 MG: 10 INJECTION, EMULSION INTRAVENOUS at 15:57

## 2022-08-26 RX ADMIN — FAMOTIDINE 20 MG: 20 TABLET ORAL at 11:04

## 2022-08-26 RX ADMIN — ROCURONIUM BROMIDE 50 MG: 50 INJECTION, SOLUTION INTRAVENOUS at 13:05

## 2022-08-26 RX ADMIN — BACITRACIN ZINC AND POLYMYXIN B SULFATE 1 APPLICATION: 500; 10000 OINTMENT OPHTHALMIC at 18:31

## 2022-08-26 RX ADMIN — OXYBUTYNIN CHLORIDE 10 MG: 10 TABLET, EXTENDED RELEASE ORAL at 20:58

## 2022-08-26 RX ADMIN — SODIUM CHLORIDE, POTASSIUM CHLORIDE, SODIUM LACTATE AND CALCIUM CHLORIDE 9 ML/HR: 600; 310; 30; 20 INJECTION, SOLUTION INTRAVENOUS at 11:03

## 2022-08-26 RX ADMIN — LIDOCAINE HYDROCHLORIDE 0.5 ML: 10 INJECTION, SOLUTION EPIDURAL; INFILTRATION; INTRACAUDAL; PERINEURAL at 11:03

## 2022-08-26 RX ADMIN — ROCURONIUM BROMIDE 20 MG: 50 INJECTION, SOLUTION INTRAVENOUS at 14:28

## 2022-08-26 RX ADMIN — Medication 200 MCG: at 13:36

## 2022-08-26 RX ADMIN — GABAPENTIN 600 MG: 300 CAPSULE ORAL at 11:04

## 2022-08-26 RX ADMIN — PROPOFOL 25 MCG/KG/MIN: 10 INJECTION, EMULSION INTRAVENOUS at 13:15

## 2022-08-26 RX ADMIN — ONDANSETRON 4 MG: 2 INJECTION INTRAMUSCULAR; INTRAVENOUS at 16:12

## 2022-08-26 RX ADMIN — ROCURONIUM BROMIDE 10 MG: 50 INJECTION, SOLUTION INTRAVENOUS at 15:11

## 2022-08-26 RX ADMIN — CEFAZOLIN SODIUM 2 G: 2 INJECTION, SOLUTION INTRAVENOUS at 13:12

## 2022-08-26 RX ADMIN — MELOXICAM 15 MG: 15 TABLET ORAL at 11:04

## 2022-08-26 RX ADMIN — ROCURONIUM BROMIDE 30 MG: 50 INJECTION, SOLUTION INTRAVENOUS at 13:57

## 2022-08-26 RX ADMIN — SODIUM CHLORIDE 100 ML/HR: 9 INJECTION, SOLUTION INTRAVENOUS at 18:31

## 2022-08-26 RX ADMIN — CEFAZOLIN SODIUM 2 G: 2 INJECTION, SOLUTION INTRAVENOUS at 21:03

## 2022-08-26 RX ADMIN — DEXAMETHASONE SODIUM PHOSPHATE 8 MG: 4 INJECTION, SOLUTION INTRA-ARTICULAR; INTRALESIONAL; INTRAMUSCULAR; INTRAVENOUS; SOFT TISSUE at 13:05

## 2022-08-26 RX ADMIN — BUPRENORPHINE HYDROCHLORIDE 0.3 MG: 0.32 INJECTION INTRAMUSCULAR; INTRAVENOUS at 13:07

## 2022-08-26 RX ADMIN — BACITRACIN 1 APPLICATION: 500 OINTMENT TOPICAL at 20:58

## 2022-08-26 RX ADMIN — ACETAMINOPHEN 1000 MG: 500 TABLET ORAL at 11:04

## 2022-08-26 RX ADMIN — BUPIVACAINE HYDROCHLORIDE 60 ML: 2.5 INJECTION, SOLUTION EPIDURAL; INFILTRATION; INTRACAUDAL; PERINEURAL at 13:07

## 2022-08-26 RX ADMIN — SODIUM CHLORIDE, POTASSIUM CHLORIDE, SODIUM LACTATE AND CALCIUM CHLORIDE: 600; 310; 30; 20 INJECTION, SOLUTION INTRAVENOUS at 15:51

## 2022-08-26 RX ADMIN — EPHEDRINE SULFATE 15 MG: 50 INJECTION INTRAVENOUS at 13:34

## 2022-08-26 RX ADMIN — GLYCOPYRROLATE 0.2 MG: 0.2 INJECTION INTRAMUSCULAR; INTRAVENOUS at 13:32

## 2022-08-26 RX ADMIN — SUGAMMADEX 200 MG: 100 INJECTION, SOLUTION INTRAVENOUS at 16:10

## 2022-08-26 NOTE — ANESTHESIA PROCEDURE NOTES
Peripheral Block      Patient reassessed immediately prior to procedure    Patient location during procedure: OR  Reason for block: at surgeon's request and post-op pain management  Performed by  CRNA/CAA: Lesia Siegel CRNASRNA: Meme Aquino SRNA  Preanesthetic Checklist  Completed: patient identified, IV checked, site marked, risks and benefits discussed, surgical consent, monitors and equipment checked, pre-op evaluation and timeout performed  Prep:  Pt Position: supine  Sterile barriers:cap, gloves, sterile barriers and mask  Prep: ChloraPrep  Patient monitoring: blood pressure monitoring, continuous pulse oximetry and EKG  Procedure    Sedation: yes  Performed under: general  Guidance:ultrasound guided  Images:still images obtained, printed/placed on chart    Laterality:Bilateral  Block Type:TAP  Injection Technique:single-shot  Needle Type:short-bevel and echogenic  Needle Gauge:20 G  Resistance on Injection: none    Medications Used: buprenorphine (BUPRENEX) injection, 0.3 mg  dexamethasone sodium phosphate injection, 4 mg  bupivacaine PF (MARCAINE) 0.25 % injection, 60 mL  Med administered at 8/26/2022 1:07 PM      Medications  Comment:Block Injection:  LA dose divided between Right and Left block        Post Assessment  Injection Assessment: negative aspiration for heme, incremental injection and no paresthesia on injection  Patient Tolerance:comfortable throughout block  Complications:no  Additional Notes      Under Ultrasound guidance, a BBraun 4inch 360 degree needle was advanced with Normal Saline hydro dissection of tissue.  The Internal Oblique and Transversus Abdominus muscles where visualized.  At or before the aponeurosis of Internal Oblique, local anesthetic spread was visualized in the Transversus Abdominus Plane. Injection was made incrementally with aspiration every 5 mls.  There was no  intravascular injection,  injection pressure was normal, there was no neural injection,  and the procedure was completed without difficulty.  Thank You.

## 2022-08-26 NOTE — H&P
Pre-Op H&P  James Gary Meese  [id number]  1950      Chief complaint: Prostate cancer      Subjective:  Patient is a 71 y.o.male presents for scheduled surgery by Dr. Rudolph. He anticipates a ROBOTIC ASSISTED LAPAROSCOPIC PROSTATECTOMY, RIGHT NERVE SPARING, LEFT WIDE DISSECTION, BILATERAL PELVIC LYMPH NODE DISSECTION today. He had prostate biopsy on 6/28/22 due to rising PSA levels and was found to have adenocarcinoma. Lopez 3+4=7. He denies urinary issues.      Review of Systems:  Constitutional-- No fever, chills or sweats. No fatigue.  CV-- No chest pain, palpitation or syncope  Resp-- No SOB, cough, hemoptysis. +DAGMAR no cpap   Skin--No rashes or lesions      Allergies: No Known Allergies      Home Meds:  Medications Prior to Admission   Medication Sig Dispense Refill Last Dose   • diphenhydrAMINE (BENADRYL) 25 mg capsule Take 25 mg by mouth At Night As Needed for Sleep.   8/25/2022 at Unknown time   • hydroCHLOROthiazide (HYDRODIURIL) 25 MG tablet Take 1 tablet by mouth Daily. 30 tablet 5 8/26/2022 at Unknown time   • Multiple Vitamins-Minerals (OCUVITE ADULT FORMULA PO) Take 1 capsule by mouth Daily.   8/25/2022 at Unknown time   • Plant Sterols and Stanols (CHOLESTOFF PO) Take 1 capsule by mouth Daily.   8/25/2022 at Unknown time         PMH:   Past Medical History:   Diagnosis Date   • Basal cell carcinoma (BCC) of skin of nose     Dx 1/20- s/p Mohs Surgery   • Elevated PSA    • History of varicella    • Hypertension     Dx 2017   • Melanoma in situ of torso excluding breast (HCC)     Dx 7/20   • Obstructive sleep apnea syndrome     Description: dx 9/10-mild, noncompliant with CPAP   • Osteoarthritis     Description: mild   • Prostate cancer (HCC)    • SCC (squamous cell carcinoma), face     Dx 7/20- right sideburn    • Simple renal cyst     Dx 10/7/15 by u/s (bilateral)     PSH:    Past Surgical History:   Procedure Laterality Date   • APPENDECTOMY  1963   • INGUINAL HERNIA REPAIR Right 07/2013     "with mesh   • MOHS SURGERY  01/20/2020    nasal BCC   • SKIN BIOPSY         Immunization History:  Influenza: 2021  Pneumococcal: UTD  Tetanus: UTD  Covid : No    Social History:   Tobacco:   Social History     Tobacco Use   Smoking Status Never Smoker   Smokeless Tobacco Never Used      Alcohol:     Social History     Substance and Sexual Activity   Alcohol Use Yes    Comment: 1 beer per week         Physical Exam:/87 (BP Location: Right arm, Patient Position: Lying)   Pulse 76   Temp 97.2 °F (36.2 °C) (Temporal)   Resp 18   Ht 175.3 cm (69.02\")   Wt 77.1 kg (169 lb 15.6 oz)   SpO2 98%   BMI 25.09 kg/m²       General Appearance:    Alert, cooperative, no distress, appears stated age   Head:    Normocephalic, without obvious abnormality, atraumatic   Lungs:     Clear to auscultation bilaterally, respirations unlabored    Heart:   Regular rate and rhythm, S1 and S2 normal    Abdomen:    Soft without tenderness   Extremities:   Extremities normal, atraumatic, no cyanosis or edema   Skin:   Skin color, texture, turgor normal, no rashes or lesions   Neurologic:   Grossly intact     Results Review:     LABS:  Lab Results   Component Value Date    WBC 6.52 08/24/2022    HGB 17.1 08/24/2022    HCT 50.0 08/24/2022    MCV 90.9 08/24/2022     08/24/2022    NEUTROABS 4.50 05/10/2022    GLUCOSE 84 06/07/2022    BUN 16 06/07/2022    CREATININE 1.20 08/12/2022    EGFRIFNONA 75 03/14/2017     06/07/2022    K 4.1 08/24/2022     06/07/2022    CO2 26.8 06/07/2022    CALCIUM 9.1 06/07/2022    ALBUMIN 4.60 05/10/2022    AST 22 05/10/2022    ALT 15 05/10/2022    BILITOT 1.1 05/10/2022       RADIOLOGY:  Imaging Results (Last 72 Hours)     ** No results found for the last 72 hours. **          I reviewed the patient's new clinical results.    Cancer Staging (if applicable)  Cancer Patient: __ yes __no __unknown; If yes, clinical stage T:__ N:__M:__, stage group or __N/A      Impression: Prostate cancer "       Plan: ROBOTIC ASSISTED LAPAROSCOPIC PROSTATECTOMY, RIGHT NERVE SPARING, LEFT WIDE DISSECTION, BILATERAL PELVIC LYMPH NODE DISSECTION      Alessia Allison, APRN   8/26/2022   11:04 EDT

## 2022-08-26 NOTE — ANESTHESIA PROCEDURE NOTES
Airway  Urgency: elective    Date/Time: 8/26/2022 1:07 PM  Airway not difficult    General Information and Staff    Patient location during procedure: OR  CRNA/CAA: Julio Byrnes CRNA    Indications and Patient Condition  Indications for airway management: airway protection    Preoxygenated: yes  MILS not maintained throughout  Mask difficulty assessment: 1 - vent by mask    Final Airway Details  Final airway type: endotracheal airway      Successful airway: ETT  Cuffed: yes   Successful intubation technique: direct laryngoscopy  Facilitating devices/methods: intubating stylet and Bougie  Endotracheal tube insertion site: oral  Blade: Keyana  Blade size: 3  ETT size (mm): 7.5  Cormack-Lehane Classification: grade IIb - view of arytenoids or posterior of glottis only  Placement verified by: chest auscultation and capnometry   Measured from: lips  ETT/EBT  to lips (cm): 20  Number of attempts at approach: 1  Assessment: lips, teeth, and gum same as pre-op and atraumatic intubation    Additional Comments  Negative epigastric sounds, Breath sound equal bilaterally with symmetric chest rise and fall

## 2022-08-26 NOTE — CONSULTS
[facility]  CONSULT NOTE      Patient Name: James Gary Meese  : 1950  MRN: [id number]    Primary Care Physician: Breana Harden MD  Provider requesting consultation: Mushtaq Rudolph MD    Subjective   Subjective     Reason for Consultation:  Medical management    HPI:  James Gary Meese is a 71 y.o. male with a past medical history significant for skin cancer, hypertension, HLD, DAGMAR,, and prostate cancer. He is less than 24 hours post op laparoscopic prostatectomy, right nerve sparing, left wide dissection, and bilateral pelvic lymph node dissection. Currently being followed by Dr. Rudolph per urology. Presents as a consult for medical management. Currently there are no complaints of fever, cough, congestion, SOB, or chest pain. No abdominal pain or N/v/D. Does report his left eye was scratched en route from procedure. Overall stable without additional complaints or concerns. Will continue to monitor.      Review of Systems   Constitutional: Negative for chills, fatigue and fever.   HENT: Negative for congestion and trouble swallowing.    Eyes: Negative for photophobia and visual disturbance.   Respiratory: Negative for cough and shortness of breath.    Cardiovascular: Negative for chest pain and leg swelling.   Gastrointestinal: Negative for abdominal pain, diarrhea, nausea and vomiting.   Endocrine: Negative for cold intolerance and heat intolerance.   Genitourinary: Negative for flank pain and frequency.   Musculoskeletal: Negative for back pain and gait problem.   Skin: Negative for pallor and rash.   Allergic/Immunologic: Negative for immunocompromised state.   Neurological: Negative for dizziness, speech difficulty and headaches.   Hematological: Negative for adenopathy.   Psychiatric/Behavioral: Negative for agitation and confusion.       All other systems reviewed and are negative.     Personal History     Past Medical History:   Diagnosis Date   •  Basal cell carcinoma (BCC) of skin of nose     Dx 1/20- s/p Mohs Surgery   • Elevated PSA    • History of varicella    • Hypertension     Dx 2017   • Melanoma in situ of torso excluding breast (HCC)     Dx 7/20   • Obstructive sleep apnea syndrome     Description: dx 9/10-mild, noncompliant with CPAP   • Osteoarthritis     Description: mild   • Prostate cancer (HCC)    • SCC (squamous cell carcinoma), face     Dx 7/20- right sideburn    • Simple renal cyst     Dx 10/7/15 by u/s (bilateral)       Past Surgical History:   Procedure Laterality Date   • APPENDECTOMY  1963   • INGUINAL HERNIA REPAIR Right 07/2013    with mesh   • MOHS SURGERY  01/20/2020    nasal BCC   • SKIN BIOPSY         Family History: family history includes Hypertension in his mother; No Known Problems in his father; Osteoarthritis in his mother. Otherwise pertinent FHx was reviewed and unremarkable.     Social History:  reports that he has never smoked. He has never used smokeless tobacco. He reports current alcohol use. He reports that he does not use drugs.    Medications:  Multiple Vitamins-Minerals, Plant Sterols and Stanols, diphenhydrAMINE, and hydroCHLOROthiazide    Scheduled Meds:acetaminophen, 650 mg, Oral, Q6H   Or  acetaminophen, 650 mg, Oral, Q6H   Or  acetaminophen, 650 mg, Rectal, Q6H  bacitracin, 1 application, Topical, Q12H  ceFAZolin, 2 g, Intravenous, Q8H  [START ON 8/27/2022] enoxaparin, 40 mg, Subcutaneous, Daily  gabapentin, 100 mg, Oral, TID  [START ON 8/27/2022] hydroCHLOROthiazide, 25 mg, Oral, Daily  oxybutynin XL, 10 mg, Oral, Daily      Continuous Infusions:lactated ringers, 100 mL/hr, Last Rate: 100 mL/hr (08/26/22 1819)  sodium chloride, 100 mL/hr, Last Rate: 100 mL/hr (08/26/22 1831)      PRN Meds:.HYDROmorphone  •  hyoscyamine sulfate  •  ondansetron **OR** ondansetron  •  oxyCODONE    No Known Allergies    Objective   Objective     Vital Signs:   Temp:  [97 °F (36.1 °C)-97.9 °F (36.6 °C)] 97.5 °F (36.4 °C)  Heart  Rate:  [70-95] 89  Resp:  [16-18] 16  BP: (110-143)/(69-88) 135/84  Flow (L/min):  [2-8] 2    Physical Exam  Constitutional: Awake, alert  Eyes: PERRLA, sclerae anicteric, no conjunctival injection  HENT: NCAT, mucous membranes moist  Neck: Supple, no thyromegaly, no lymphadenopathy, trachea midline  Respiratory: Clear to auscultation bilaterally, nonlabored respirations   Cardiovascular: RRR, no murmurs, rubs, or gallops, palpable pedal pulses bilaterally  Gastrointestinal: Positive bowel sounds, soft, nontender, nondistended  laparoscopic incisions noted. Clean and viable  Musculoskeletal: No bilateral ankle edema, no clubbing or cyanosis to extremities  Psychiatric: Appropriate affect, cooperative  Neurologic: Oriented x 3, strength symmetric in all extremities, Cranial Nerves grossly intact to confrontation, speech clear  Skin: No rashes        Result Review:  I have personally reviewed the results from the time of admission and agree with these findings:  [x]  Laboratory  [x]  Radiology  []  EKG/Telemetry   []  Pathology  []  Old records  []  Other:  Most notable findings include: vitals stable. WBC elevated to 15,000. Chemistry and hematology otherwise favorable    LAB RESULTS:      Lab 08/26/22  1903 08/24/22  0937   WBC 15.07* 6.52   HEMOGLOBIN 15.0 17.1   HEMATOCRIT 43.3 50.0   PLATELETS 266 260   MCV 88.9 90.9         Lab 08/26/22  1903 08/24/22  0937   SODIUM 139  --    POTASSIUM 3.5 4.1   CHLORIDE 101  --    CO2 25.0  --    ANION GAP 13.0  --    BUN 18  --    CREATININE 1.01  --    EGFR 80.5  --    GLUCOSE 167*  --    CALCIUM 8.6  --    HEMOGLOBIN A1C  --  5.50                         Brief Urine Lab Results  (Last result in the past 365 days)      Color   Clarity   Blood   Leuk Est   Nitrite   Protein   CREAT   Urine HCG        06/15/22 0818 Yellow   Clear   Negative   Negative   Negative   Negative               Microbiology Results (last 10 days)     ** No results found for the last 240 hours. **           Peripheral Block    Result Date: 8/26/2022  Julio Byrnes CRNA     8/26/2022  4:38 PM Peripheral Block Patient reassessed immediately prior to procedure Patient location during procedure: OR Reason for block: at surgeon's request and post-op pain management Performed by CRNA/CAA: Lesia Siegel CRNASRNA: Meme Aquino SRNA Preanesthetic Checklist Completed: patient identified, IV checked, site marked, risks and benefits discussed, surgical consent, monitors and equipment checked, pre-op evaluation and timeout performed Prep: Pt Position: supine Sterile barriers:cap, gloves, sterile barriers and mask Prep: ChloraPrep Patient monitoring: blood pressure monitoring, continuous pulse oximetry and EKG Procedure Sedation: yes Performed under: general Guidance:ultrasound guided Images:still images obtained, printed/placed on chart Laterality:Bilateral Block Type:TAP Injection Technique:single-shot Needle Type:short-bevel and echogenic Needle Gauge:20 G Resistance on Injection: none Medications Used: buprenorphine (BUPRENEX) injection, 0.3 mg dexamethasone sodium phosphate injection, 4 mg bupivacaine PF (MARCAINE) 0.25 % injection, 60 mL Med administered at 8/26/2022 1:07 PM Medications Comment:Block Injection:  LA dose divided between Right and Left block Post Assessment Injection Assessment: negative aspiration for heme, incremental injection and no paresthesia on injection Patient Tolerance:comfortable throughout block Complications:no Additional Notes   Under Ultrasound guidance, a BBraun 4inch 360 degree needle was advanced with Normal Saline hydro dissection of tissue.  The Internal Oblique and Transversus Abdominus muscles where visualized.  At or before the aponeurosis of Internal Oblique, local anesthetic spread was visualized in the Transversus Abdominus Plane. Injection was made incrementally with aspiration every 5 mls.  There was no  intravascular injection,  injection pressure was normal,  there was no neural injection, and the procedure was completed without difficulty.  Thank You.           Assessment & Plan   Assessment & Plan     Active Hospital Problems    Diagnosis  POA   • **Prostate cancer (HCC) [C61]  Yes     Priority: High   • Leukocytosis [D72.829]  Yes     Priority: High   • Hypertension [I10]  Yes     Priority: Medium   • SCC (squamous cell carcinoma), face [C44.320]  Yes     Priority: Low   • Melanoma in situ of torso excluding breast (HCC) [D03.59]  Yes     Priority: Low   • Basal cell carcinoma (BCC) of skin of nose [C44.311]  Yes     Priority: Low   • Obstructive sleep apnea syndrome [G47.33]  Yes     Priority: Low      Resolved Hospital Problems   No resolved problems to display.       Prostate Cancer  - less than 24 hours S/P laparoscopic prostatectomy  - followed per urology by Dr. Rudolph  - with leukocytosis, 15,000. Suspect reactive to procedure. Afebrile.  - check procalcitonin, lactic acid  - continue Cefazolin  - strict I&O  - continue Ditropan  - IVF  - pain and nausea control as needed    HTN  - Currently stable. HCTZ resumed    GERD  - pepcid    DAGMAR  - non compliant with CPAP. Order as needed    Thank you for allowing [facility] to provide consultative care for your patient, we will continue to follow while clinically appropriate.    Electronically signed by Lesly Wells PA-C, 08/26/22, 8:15 PM EDT.

## 2022-08-26 NOTE — ANESTHESIA POSTPROCEDURE EVALUATION
Patient: James Gary Meese    Procedure Summary     Date: 08/26/22 Room / Location:  [facility]  [facility]    Anesthesia Start: 1302 Anesthesia Stop: 1623    Procedure: ROBOTIC ASSISTED LAPAROSCOPIC PROSTATECTOMY, RIGHT NERVE SPARING, LEFT WIDE DISSECTION, BILATERAL PELVIC LYMPH NODE DISSECTION (N/A Abdomen) Diagnosis:       Prostate cancer (HCC)      (Prostate cancer (HCC) [C61])    Surgeons: Mushtaq Rudolph MD Provider: Stanley Campo MD    Anesthesia Type: general with block, ERAS Protocol ASA Status: 3          Anesthesia Type: general with block, ERAS Protocol    Vitals  Vitals Value Taken Time   BP     Temp     Pulse 71 08/26/22 1622   Resp     SpO2 99 % 08/26/22 1622   Vitals shown include unvalidated device data.        Post Anesthesia Care and Evaluation    Patient location during evaluation: PACU  Patient participation: complete - patient participated  Level of consciousness: sleepy but conscious  Pain score: 0  Pain management: adequate    Airway patency: patent  Anesthetic complications: No anesthetic complications  PONV Status: none  Cardiovascular status: hemodynamically stable and acceptable  Respiratory status: nonlabored ventilation, acceptable, nasal cannula and oral airway  Hydration status: acceptable

## 2022-08-26 NOTE — OP NOTE
PROSTATECTOMY LAPAROSCOPIC WITH DAVINCI ROBOT  Procedure Report    Patient Name:  James Gary Meese  YOB: 1950    Date of Surgery:  8/26/2022     Indications: The patient is a 71-year-old male who was found to have high-volume grade group 2 prostate cancer involving the left side, after discussion of risks, benefits and alternatives the patient elected to proceed with robotic prostatectomy today.  Informed consent was reviewed and signed.    Pre-op Diagnosis:   Prostate cancer (HCC) [C61]       Post-Op Diagnosis Codes:     * Prostate cancer (HCC) [C61]       Procedure(s):  1. ROBOTIC ASSISTED LAPAROSCOPIC PROSTATECTOMY  2.  RIGHT NERVE SPARING, LEFT WIDE DISSECTION  3. BILATERAL PELVIC LYMPH NODE DISSECTION    Staff:  Surgeon(s):  Mushtaq Rudolph MD Stark, Timothy, MD    MODIFIER 80  Assistant: Micah Rivas MD  was responsible for performing the following activities: Retraction, Suction, Irrigation, Suturing, Closing and Placing Dressing and their skilled assistance was necessary for the success of this case.    Anesthesia: General    Estimated Blood Loss: 100 mL    Implants:    Implant Name Type Inv. Item Serial No.  Lot No. LRB No. Used Action   CLIP LIG HEMOLOK PA LG 6CT PRP - [id number] Implant CLIP LIG HEMOLOK PA LG 6CT PRP  [employer] MEDICAL [id number] N/A 1 Implanted   RELOAD ECHELON ENDOPATH GST 45MM SARMAD - [id number] Implant RELOAD ECHELON ENDOPATH GST 45MM SARMAD  [employer]  DIV OF J AND J [id number] N/A 1 Implanted       Specimen:  PROSTATE, SEMINAL VESICLES, BILATERAL PELVIC LYMPH NODES, WHIT PROSTATIC FAT             Drains: 20 Fr tamez catheter      Findings:   1. Uncomplicated robotic prostatectomy RIGHT nerve sparing, LEFT wide dissection   2. Bilateral pelvic lymphadenectomy   3. Water tight urethrovesical anastomosis  4. LLQ TOSHIA drain     Complications: None    Description of Procedure:     The patient was seen and identified in the pre-operative area. Informed consent was  verified after repeat discussion regarding risks and benefits of operation (detailed in pre-op H+P).  He was seen taken to the operating room per anesthesia and laid in supine position on the operating table. SCDs were placed in bilateral lower extremities and were cycling. Pre operative IV Ancef was administered. The patient was intubated and general anesthesia was introduced. Once adequate anesthesia was obtained, he was placed in a low lithotomy position. A brief time-out was performed confirming correct patient, procedure, and laterality. The abdomen and genitals were prepped and draped in sterile fashion.    A 16 Fr tamez catheter was placed in sterile fashion.     The 11 blade scalpel was used to make a stab incision above the umbilicus. The Veress needle was used to gain access above the umbilicus. Drop test confirmed placement and access was obtained without difficulty.  Pneumoperitoneum was increased to 15 mmHg.  The supraumbilical incision was extended and non-bladed 8mm robotic visual camera port was then placed under direct vision. 3 robotic ports and two assistant ports, one 5 mm port in the right upper quadrant and one 12 mm assistant port at the right anterior abdomen were placed under direct vision without difficulty. The 12 mm assistant port was temporarily removed and the Physitrack device was used to pre-place a closure suture of 0-Vicryl at the assistant port. The 12 mm port was readvanced. The patient was then placed in steep Trendelenburg position. The abdomen was inspected. The robot was then docked.    The patient had no significant colonic or small bowel adhesions in the deep pelvis.  The patient had extremely favorable anatomy and good visualization throughout.    I began my initial approach which was a posterior dissection, identifying the pouch of Tej and incising with monopolar scissors 2 cm above the peritoneal reflection above the perirectal fat, we extended this incision for  roughly 2 cm bilaterally and identified the seminal vesicles and vas deferens posterior to the prostate.  The left and right vas deferens were skeletonized out laterally and then divided after controlling the basal artery and vessels with the bipolar device.  The seminal vesicle adventitia was divided with blunt and cautery dissection, and the tip of the seminal vesicles bilaterally were identified, the bipolar device was used to cauterize the vessels at the base of the seminal vesicles, making sure to stay right on top of the seminal vesicle and avoid going laterally which would potentially injure the neurovascular bundles.    Next, the transected vas deferens and intact skeletonized seminal vesicles were lifted up with the aid of the fourth arm, I then carried my dissection distally, staying right underneath the prostate and bluntly  off the Denonvilliers' fascia beneath.  I begin my nerve sparing dissection on the patient's right side, sweeping off the neurovascular bundle tissue at the posterior and lateral aspects of the prostate, keeping Denonvilliers' fascia down.  I continued this is far laterally as was possible.     Once this was completed, I backed the camera up and begin my standard template bilateral pelvic lymph node dissection, starting on the patient's right side.  The boundaries of the template included the external iliac veins distally to the inguinal ligament and node of [place] and lateral towards the pelvic sidewall.  Hemolock clips were placed distal to the node of [place] to prevent lymphocele.  The obturator nerve and vessels were identified and preserved, hemolock clip was placed at the junction of the obturator nodes deep to the external iliac vein to prevent lymphocele. No obturator nerve injury was obvious.  The right pelvic lymph node packet was removed via the assistant port. The same procedure was performed on the patient's left side.    I then identified the vas deferens and  "removed a piece medial to the iliac vessels. This was performed on both sides. Next, the urachus and medial umbilical ligaments were divided after coagulation with the bipolar [place], and the space of Retzius was entered. The bladder was dropped and intraperitonealized. We then carried down our lateral incisions along the medial umbilical ligaments bilaterally to the previous site where the segment of vas deferens had been ligated and removed.  The superficial dorsal vein was controlled with the bipolar [place] and transected, the prostate was then defatted and this fat was removed and sent as a separate specimen, \"periprostatic fat.\"      The endopelvic fascia was identified and cleared of surrounding attachments. I then opened up the endopelvic fascia sharply on both sides and swept away the levator muscles. This was carried out distally toward the apex of the prostate, identifying and avoiding the urethral sphincter muscle fibers. The junction of the dorsal venous complex and urethra was identified. The puboprostatic ligaments were then divided with cautery, and the dorsal venous complex was isolated and ligated using a vascular stapler, a 45 mm blue load with Enigmatec hand-held stapler via the aid of the assistant.    I then used the monopolar scissor to begin division at the junction of the prostate and bladder neck, longitudinal bladder muscle fibers were confirmed throughout this dissection with avoidance of inadvertently dissecting through prostate tissue.  I eventually dissected down through the bladder neck and exposed the Levine catheter which was visualized and brought up to the anterior abdominal wall and suspended to anterior abdominal wall with the aid of the 4th robotic arm.  The posterior bladder neck was transected with cautery, avoiding \"button-holing\" of the bladder. This plane was dissected until the previously skeletonized vas deferens and seminal vesicles were identified.  The vas deferens " and seminal vesicles were then grabbed with the fourth arm and lifted up. The lateral prostatic pedicles were skeletonized with blunt and light cautery dissection.    Next I performed a high anterior release of the prostatic fascia on the patient's right side with sharp dissection, down through the periprostatic veins, exposing the shiny white prostate capsular tissue beneath.  This nerve sparing plane was continued with blunt and sharp dissection until the previously created nerve sparing plane performed initially via posterior approach was identified, and these two nerve sparing planes were then .    I then used the [place] to bluntly create a space along the prostatic vascular pedicle staying just off the prostate. The pedicle was then controlled with hemolock clips along its course, and then divided sharply.     On the patient's left side, a wide dissection was performed, with skeletonization of the prostatic pedicle performed and using the da Trent vessel sealer device to march down the pedicle of the prostate keeping the neurovascular bundle and prostatic fascia on the prostate specimen.  The rectum was retracted to the contralateral side via the assistant during this dissection to avoid rectal injury.    Next, I continued to dissect distally towards the apex of the prostate avoiding injury to the neurovascular bundle on the right side.  The rectourethralis attachments were divided along this dissection course.  The urethra proper was identified and cleared laterally and posteriorly with blunt and sharp dissection.  Next I used the monopolar scissors to dissect the staple neurovascular bundle off of the prostate and continued this dissection until the urethra proper was identified distally.      Next, the anterior urethra was transected with sharp dissection until the catheter was visualized. The catheter was then pulled back and the posterior urethra was transected fully.  The final rectourethralis  attachments along the posterior urethra plate were transected.  This completely freed up the specimen containing prostate and seminal vesicles. It was placed in an EndoCatch bag. The pelvis was then irrigated out. No injuries were noted. Good hemostasis was visualized.    The vesicourethral anastomosis was then begun.     Our anastomotic stitch, two separate 3-0 V-Loc sutures previously looped to one another, was then brought in, both needles were advanced through the posterior bladder neck and then to the urethra at the 5:30 oclock and 6:30 oclock positions.  The anastomosis was then performed in a running fashion in both a clockwise and counterclockwise fashion, after gently maneuvering the bladder towards the urethra and tightening the sutures as we went.  Our final catheter was placed, a 20 Amharic Levine catheter with 15 mL of sterile water placed into the balloon.  The anastomosis was tested and proved to be watertight.  The Endo Catch bag was then grasped from the supraumbilical port for extraction.  Next, a Urban-Oliver drain was brought in through the left lateral robotic arm port and maneuvered to the distal pelvis.  The drain was secured to the skin with a 2-0 silk suture.    The robot was then undocked.  The supraumbilical incision was extended with the Bovie and the fascia extended over a finger avoiding bowel injury. The specimen in the Endo Catch bag was removed through this extraction incision above the umbilicus.  The fascia was closed with multiple 0-Vicryl sutures in a figure-of-eight fashion. The rest of the ports were irrigated out and the skin was closed with 4-0 Monocryl in a subcuticular fashion. Dermabond was then applied. The patient tolerated the procedure well. He was extubated and transferred to PACU in stable condition.    DISPOSITION:   The patient will be admitted to [facility] for overnight observation with possible discharge in the morning pending tolerance of diet, pain control  and ambulation.  TOSHIA drain will be removed prior to discharge pending appropriate output.  Levine catheter will remain in place for 7 days and the patient will follow back up with me in 1 week for catheter removal and trial of void.    MODIFIER 80  Assistant: Micah Rivas MD  was responsible for performing the following activities: Retraction, Suction, Irrigation, Suturing, Closing and Placing Dressing and their skilled assistance was necessary for the success of this case.    Mushtaq Rudolph MD     Date: 8/26/2022  Time: 19:25 EDT

## 2022-08-26 NOTE — ANESTHESIA PREPROCEDURE EVALUATION
Anesthesia Evaluation     Patient summary reviewed and Nursing notes reviewed   no history of anesthetic complications:  NPO Solid Status: > 8 hours  NPO Liquid Status: > 2 hours           Airway   Mallampati: II  TM distance: >3 FB  Neck ROM: full  No difficulty expected  Dental - normal exam     Pulmonary - normal exam   (+) sleep apnea on CPAP,   Cardiovascular - normal exam  Exercise tolerance: good (4-7 METS)    ECG reviewed    (+) hypertension,   (-) dysrhythmias, angina, GAVIN      Neuro/Psych- negative ROS  GI/Hepatic/Renal/Endo    (+)   renal disease (renal cyst),   (-) GERD, liver disease, diabetes, no thyroid disorder    Musculoskeletal     Abdominal    Substance History      OB/GYN          Other   arthritis,    history of cancer (skin cancer/ prostate cancer)                  Anesthesia Plan    ASA 3     general with block and ERAS Protocol     intravenous induction     Anesthetic plan, risks, benefits, and alternatives have been provided, discussed and informed consent has been obtained with: patient.    Plan discussed with CRNA.        CODE STATUS:

## 2022-08-27 ENCOUNTER — APPOINTMENT (OUTPATIENT)
Dept: GENERAL RADIOLOGY | Facility: HOSPITAL | Age: 72
End: 2022-08-27

## 2022-08-27 VITALS
HEART RATE: 79 BPM | RESPIRATION RATE: 18 BRPM | HEIGHT: 69 IN | OXYGEN SATURATION: 98 % | WEIGHT: 169.97 LBS | TEMPERATURE: 98 F | BODY MASS INDEX: 25.18 KG/M2 | DIASTOLIC BLOOD PRESSURE: 63 MMHG | SYSTOLIC BLOOD PRESSURE: 115 MMHG

## 2022-08-27 LAB
ALBUMIN SERPL-MCNC: 3.7 G/DL (ref 3.5–5.2)
ALBUMIN/GLOB SERPL: 1.7 G/DL
ALP SERPL-CCNC: 52 U/L (ref 39–117)
ALT SERPL W P-5'-P-CCNC: 14 U/L (ref 1–41)
ANION GAP SERPL CALCULATED.3IONS-SCNC: 14 MMOL/L (ref 5–15)
AST SERPL-CCNC: 18 U/L (ref 1–40)
BACTERIA UR QL AUTO: ABNORMAL /HPF
BASOPHILS # BLD AUTO: 0.01 10*3/MM3 (ref 0–0.2)
BASOPHILS NFR BLD AUTO: 0.1 % (ref 0–1.5)
BILIRUB SERPL-MCNC: 0.6 MG/DL (ref 0–1.2)
BILIRUB UR QL STRIP: NEGATIVE
BUN SERPL-MCNC: 18 MG/DL (ref 8–23)
BUN/CREAT SERPL: 17.1 (ref 7–25)
CALCIUM SPEC-SCNC: 8.1 MG/DL (ref 8.6–10.5)
CHLORIDE SERPL-SCNC: 98 MMOL/L (ref 98–107)
CLARITY UR: ABNORMAL
CO2 SERPL-SCNC: 23 MMOL/L (ref 22–29)
COLOR UR: YELLOW
CREAT SERPL-MCNC: 1.05 MG/DL (ref 0.76–1.27)
D-LACTATE SERPL-SCNC: 2 MMOL/L (ref 0.5–2)
D-LACTATE SERPL-SCNC: 4.4 MMOL/L (ref 0.5–2)
D-LACTATE SERPL-SCNC: 5.8 MMOL/L (ref 0.5–2)
DEPRECATED RDW RBC AUTO: 39.1 FL (ref 37–54)
EGFRCR SERPLBLD CKD-EPI 2021: 76.8 ML/MIN/1.73
EOSINOPHIL # BLD AUTO: 0 10*3/MM3 (ref 0–0.4)
EOSINOPHIL NFR BLD AUTO: 0 % (ref 0.3–6.2)
ERYTHROCYTE [DISTWIDTH] IN BLOOD BY AUTOMATED COUNT: 11.8 % (ref 12.3–15.4)
GLOBULIN UR ELPH-MCNC: 2.2 GM/DL
GLUCOSE SERPL-MCNC: 149 MG/DL (ref 65–99)
GLUCOSE UR STRIP-MCNC: NEGATIVE MG/DL
HCT VFR BLD AUTO: 44.2 % (ref 37.5–51)
HGB BLD-MCNC: 14.8 G/DL (ref 13–17.7)
HGB UR QL STRIP.AUTO: ABNORMAL
HYALINE CASTS UR QL AUTO: ABNORMAL /LPF
IMM GRANULOCYTES # BLD AUTO: 0.08 10*3/MM3 (ref 0–0.05)
IMM GRANULOCYTES NFR BLD AUTO: 0.5 % (ref 0–0.5)
KETONES UR QL STRIP: NEGATIVE
LEUKOCYTE ESTERASE UR QL STRIP.AUTO: ABNORMAL
LYMPHOCYTES # BLD AUTO: 0.47 10*3/MM3 (ref 0.7–3.1)
LYMPHOCYTES NFR BLD AUTO: 3.2 % (ref 19.6–45.3)
MCH RBC QN AUTO: 30.6 PG (ref 26.6–33)
MCHC RBC AUTO-ENTMCNC: 33.5 G/DL (ref 31.5–35.7)
MCV RBC AUTO: 91.3 FL (ref 79–97)
MONOCYTES # BLD AUTO: 0.86 10*3/MM3 (ref 0.1–0.9)
MONOCYTES NFR BLD AUTO: 5.8 % (ref 5–12)
NEUTROPHILS NFR BLD AUTO: 13.38 10*3/MM3 (ref 1.7–7)
NEUTROPHILS NFR BLD AUTO: 90.4 % (ref 42.7–76)
NITRITE UR QL STRIP: NEGATIVE
NRBC BLD AUTO-RTO: 0 /100 WBC (ref 0–0.2)
PH UR STRIP.AUTO: 5.5 [PH] (ref 5–8)
PLATELET # BLD AUTO: 275 10*3/MM3 (ref 140–450)
PMV BLD AUTO: 9.2 FL (ref 6–12)
POTASSIUM SERPL-SCNC: 3.8 MMOL/L (ref 3.5–5.2)
PROCALCITONIN SERPL-MCNC: 0.11 NG/ML (ref 0–0.25)
PROT SERPL-MCNC: 5.9 G/DL (ref 6–8.5)
PROT UR QL STRIP: ABNORMAL
RBC # BLD AUTO: 4.84 10*6/MM3 (ref 4.14–5.8)
RBC # UR STRIP: ABNORMAL /HPF
REF LAB TEST METHOD: ABNORMAL
SODIUM SERPL-SCNC: 135 MMOL/L (ref 136–145)
SP GR UR STRIP: 1.02 (ref 1–1.03)
SQUAMOUS #/AREA URNS HPF: ABNORMAL /HPF
UROBILINOGEN UR QL STRIP: ABNORMAL
WBC # UR STRIP: ABNORMAL /HPF
WBC NRBC COR # BLD: 14.8 10*3/MM3 (ref 3.4–10.8)

## 2022-08-27 PROCEDURE — 25010000002 ENOXAPARIN PER 10 MG: Performed by: STUDENT IN AN ORGANIZED HEALTH CARE EDUCATION/TRAINING PROGRAM

## 2022-08-27 PROCEDURE — 81001 URINALYSIS AUTO W/SCOPE: CPT | Performed by: PHYSICIAN ASSISTANT

## 2022-08-27 PROCEDURE — 87040 BLOOD CULTURE FOR BACTERIA: CPT | Performed by: PHYSICIAN ASSISTANT

## 2022-08-27 PROCEDURE — 85025 COMPLETE CBC W/AUTO DIFF WBC: CPT | Performed by: PHYSICIAN ASSISTANT

## 2022-08-27 PROCEDURE — 80053 COMPREHEN METABOLIC PANEL: CPT | Performed by: PHYSICIAN ASSISTANT

## 2022-08-27 PROCEDURE — G0378 HOSPITAL OBSERVATION PER HR: HCPCS

## 2022-08-27 PROCEDURE — 99214 OFFICE O/P EST MOD 30 MIN: CPT | Performed by: HOSPITALIST

## 2022-08-27 PROCEDURE — 83605 ASSAY OF LACTIC ACID: CPT | Performed by: PHYSICIAN ASSISTANT

## 2022-08-27 PROCEDURE — 87086 URINE CULTURE/COLONY COUNT: CPT | Performed by: PHYSICIAN ASSISTANT

## 2022-08-27 PROCEDURE — 25010000002 VANCOMYCIN 10 G RECONSTITUTED SOLUTION

## 2022-08-27 PROCEDURE — 84145 PROCALCITONIN (PCT): CPT | Performed by: PHYSICIAN ASSISTANT

## 2022-08-27 PROCEDURE — 99024 POSTOP FOLLOW-UP VISIT: CPT | Performed by: STUDENT IN AN ORGANIZED HEALTH CARE EDUCATION/TRAINING PROGRAM

## 2022-08-27 PROCEDURE — 25010000002 PIPERACILLIN SOD-TAZOBACTAM PER 1 G: Performed by: PHYSICIAN ASSISTANT

## 2022-08-27 PROCEDURE — 71045 X-RAY EXAM CHEST 1 VIEW: CPT

## 2022-08-27 RX ORDER — BACITRACIN ZINC 500 [USP'U]/G
OINTMENT TOPICAL
Qty: 28 G | Refills: 0 | Status: SHIPPED | OUTPATIENT
Start: 2022-08-27 | End: 2022-11-16

## 2022-08-27 RX ORDER — NITROFURANTOIN 25; 75 MG/1; MG/1
100 CAPSULE ORAL 2 TIMES DAILY
Qty: 4 CAPSULE | Refills: 0 | Status: SHIPPED | OUTPATIENT
Start: 2022-08-31 | End: 2022-09-02

## 2022-08-27 RX ORDER — POLYETHYLENE GLYCOL 3350 17 G/17G
17 POWDER, FOR SOLUTION ORAL DAILY
Qty: 238 G | Refills: 1 | Status: SHIPPED | OUTPATIENT
Start: 2022-08-27 | End: 2022-11-16

## 2022-08-27 RX ORDER — AMOXICILLIN 250 MG
1 CAPSULE ORAL 2 TIMES DAILY
Qty: 20 TABLET | Refills: 0 | Status: SHIPPED | OUTPATIENT
Start: 2022-08-27 | End: 2022-11-16

## 2022-08-27 RX ORDER — HYOSCYAMINE SULFATE 0.12 MG/1
0.12 TABLET SUBLINGUAL EVERY 4 HOURS PRN
Qty: 30 EACH | Refills: 1 | Status: SHIPPED | OUTPATIENT
Start: 2022-08-27 | End: 2022-11-16

## 2022-08-27 RX ORDER — HYDROCODONE BITARTRATE AND ACETAMINOPHEN 5; 325 MG/1; MG/1
1 TABLET ORAL EVERY 6 HOURS PRN
Qty: 10 TABLET | Refills: 0 | Status: SHIPPED | OUTPATIENT
Start: 2022-08-27 | End: 2022-11-16

## 2022-08-27 RX ADMIN — BACITRACIN 1 APPLICATION: 500 OINTMENT TOPICAL at 08:35

## 2022-08-27 RX ADMIN — ACETAMINOPHEN 650 MG: 325 TABLET, FILM COATED ORAL at 08:29

## 2022-08-27 RX ADMIN — ACETAMINOPHEN 650 MG: 325 TABLET, FILM COATED ORAL at 02:41

## 2022-08-27 RX ADMIN — GABAPENTIN 100 MG: 100 CAPSULE ORAL at 08:29

## 2022-08-27 RX ADMIN — ENOXAPARIN SODIUM 40 MG: 40 INJECTION SUBCUTANEOUS at 08:29

## 2022-08-27 RX ADMIN — SODIUM CHLORIDE 1000 ML: 9 INJECTION, SOLUTION INTRAVENOUS at 01:25

## 2022-08-27 RX ADMIN — OXYBUTYNIN CHLORIDE 10 MG: 10 TABLET, EXTENDED RELEASE ORAL at 08:29

## 2022-08-27 RX ADMIN — HYDROCHLOROTHIAZIDE 25 MG: 25 TABLET ORAL at 08:29

## 2022-08-27 RX ADMIN — TAZOBACTAM SODIUM AND PIPERACILLIN SODIUM 3.38 G: 375; 3 INJECTION, SOLUTION INTRAVENOUS at 03:31

## 2022-08-27 RX ADMIN — VANCOMYCIN HYDROCHLORIDE 1500 MG: 10 INJECTION, POWDER, LYOPHILIZED, FOR SOLUTION INTRAVENOUS at 04:17

## 2022-08-27 RX ADMIN — TAZOBACTAM SODIUM AND PIPERACILLIN SODIUM 3.38 G: 375; 3 INJECTION, SOLUTION INTRAVENOUS at 08:31

## 2022-08-27 RX ADMIN — SODIUM CHLORIDE 100 ML/HR: 9 INJECTION, SOLUTION INTRAVENOUS at 02:41

## 2022-08-27 NOTE — DISCHARGE INSTRUCTIONS
Robotic Assisted Laparoscopic Prostatectomy (RALP) Post-Operative Care:    Pathology Results  Your pathology results will be discussed at your postoperative follow-up, your surgeon also may call you once these results are finalized, this usually takes 4 to 5 days or more.    Anesthesia Precautions and Expectations  You may experience a sore throat, jaw discomfort, or muscle aches related to anesthesia, these symptoms may last a few days.    Pain Control  You will be prescribed a short course of narcotics (Hydrocodone mixed with Tylenol or Oxycodone) that you should use as needed for moderate to severe pain.  If you are not having moderate to severe pain we recommend using over the counter Tylenol to control your pain.  This will help to prevent constipation which is very common when taking narcotics.  Do not drive a vehicle if taking narcotic.    Bowel Management  To prevent constipation, you will be sent home with a stool softener and a stimulant laxative to help keep your bowels moving at least 1 week post-procedure.  The medications are typically Senokot and/or MiraLAX.  Take these daily or twice daily to stimulate bowel movements, especially if taking narcotic.  It is okay if you are having loose bowels 1 week post procedure.  You can gradually come off of these medications once your bowels returned to normal.  Do not strain when having a bowel movement, this can increase your risk of hernia formation at your incisions.    Activity Level  It is important that you continue to walk multiple times every day when you return home.  This will help to ease abdominal pain, keep your abdominal muscles moving and stretch which promotes healing, helps to improve your bowel function, and will help to prevent blood clots from forming in the legs (DVT).  It is recommended that you do not lift anything over 15 pounds for 6 weeks to prevent hernia formation at the sites of your incisions.     Showering  It is okay to shower  once you return home, it is ok to shower with your catheter in place.  Let soapy water wash over your incisions, but do not directly scrub your incisions as this can remove the skin glue overlying your incisions which is necessary for healing.  Do not submerge in a bath, hot tub or swimming pool for at least 3 weeks after surgery.    Some important information regarding CATHETER CARE:     Do not remove your urinary catheter. If you feel that your catheter is not working the right way, call your doctor or health care team. Do not let anyone remove your catheter except your doctor or a nurse in your doctor's office. They will make sure that the catheter is taken out the right way so your urethra and bladder are not damaged.    Keep your skin and catheter clean. Clean the skin around your catheter at least two times each day. Clean your skin and catheter after every bowel movement. Apply the ointment for catheter comfort at the catheter tip and head of penis.     Always keep your urine collection bag below the level of your bladder.  Keeping the bag below your bladder will help keep your urine from flowing back into your bladder from urine collection bag to reduce risk of developing urinary infections.      Do not tug or pull on your catheter tubing. This can cause you to bleed and hurt your urethra. Do not step on the tubing when you walk.     You may also have a little leakage around the tamez catheter - this is likely from a bladder spasm, sometimes it can be bloody and men often say it happens when they sit down to have a bowel movement. Take your bladder spasm medication - Levsin and ditropan to help with spasms. If it is very bloody and does not stop, please let us know immediately.    You may notice some blood in your catheter tubing or around the catheter at the tip of your penis. This is normal, especially after a bowel movement or walking. If this happens drink plenty of fluids and take it easy for a couple  of hours. If your urine does not clear then do not hesitate to call for further instructions.    BLADDER SPASMS:  Sometimes, men have bladder spasms while the catheter is in their penis. Bladder spasms may feel like bad cramps or pains in the area above your pubic hair or in your rectum and can hurt. It may also feel like an intense urge to urinate or you may have a sudden leakage of urine around your catheter. These spasms may be caused by the catheter bothering your bladder. If you have a bladder spasm, sometimes urine will leak around your catheter. If this happens you may want to put a towel on your bed to protect your mattress. You have two medications that will be given to you to help with these spasms. When the catheter is taken out, the bladder spasms will stop. Stay away from caffeine, spicy foods, acidic food, or alcohol while the catheter is in place as this can exacerbate bladder spasms.     BLOOD IN YOUR URINE:  It is normal for the urine draining from your catheter to have some small blood clots. This is very common if you have had prostate cancer surgery. Your urine can also become blood-tinged from time to time. When your urine is blood-tinged, it has a pinkish color or even light red.    CALL IF: You urine is the color or cabernet wine, if you see larger sized blood clots in the urine, you have not had any urine out of your catheter for a couple of hours or feel like your bladder is full. Some clots can block your catheter from draining. You should regularly check that urine is draining from the catheter into your urine collection bag. It is important for you to drink healthy liquids when you have a catheter. This will help flush out your bladder.    -----------------------------------------    Please contact your physician if you are experiencing the following symptoms after your procedure:    Fever >100 Farenheit, chills, nausea, vomiting, severe abdominal or chest pain, shortness of breath,  severe urinary bleeding (darker than red fruit punch or red wine), urinary clots, or unable to urinate for more than 8 hours.     Please call the [facility] mainline at [phone], the Urology office at [phone], or present to your nearest Emergency Department if you have ongoing concerns mentioned above.

## 2022-08-27 NOTE — PROGRESS NOTES
"Pharmacy Consult-Vancomycin Dosing  James Gary Meese is a  71 y.o. male receiving vancomycin therapy.     Indication: sepsis  Consulting Provider: hospitalist  ID Consult: No    Goal AUC: 400 - 600 mg/L*hr    Current Antimicrobial Therapy  Anti-Infectives (From admission, onward)      Ordered     Dose/Rate Route Frequency Start Stop    08/27/22 0130  vancomycin 1500 mg/500 mL 0.9% NS IVPB ([facility])        Ordering Provider: Nicholas Ibarra PharmD   \"And\" Linked Group Details    19.5 mg/kg × 77.1 kg Intravenous Every 24 Hours Scheduled 08/28/22 0600 09/03/22 0859    08/27/22 0108  piperacillin-tazobactam (ZOSYN) 3.375 g in iso-osmotic dextrose 50 ml (premix)        Ordering Provider: Lesly Wells PA-C    3.375 g  over 4 Hours Intravenous Every 8 Hours 08/27/22 0800 09/03/22 0759    08/27/22 0130  vancomycin 1500 mg/500 mL 0.9% NS IVPB ([facility])        Ordering Provider: Nicholas Ibarra PharmD    19.5 mg/kg × 77.1 kg  over 90 Minutes Intravenous Once 08/27/22 0230      08/27/22 0122  piperacillin-tazobactam (ZOSYN) 3.375 g in iso-osmotic dextrose 50 ml (premix)        Ordering Provider: Lesly Wells PA-C    3.375 g  over 30 Minutes Intravenous Once 08/27/22 0215      08/27/22 0108  Pharmacy to dose vancomycin        Ordering Provider: Lesly Wells PA-C     Does not apply Continuous PRN 08/27/22 0107 09/03/22 0106    08/26/22 1036  ceFAZolin in dextrose (ANCEF) IVPB solution 2 g        Ordering Provider: Mushtaq Rudolph MD    2 g  over 30 Minutes Intravenous Once 08/26/22 1038 08/26/22 1312            Allergies  Allergies as of 07/18/2022    (No Known Allergies)       Labs  Results from last 7 days   Lab Units 08/26/22  1903   BUN mg/dL 18   CREATININE mg/dL 1.01     Results from last 7 days   Lab Units 08/26/22  1903 08/24/22  0937   WBC 10*3/mm3 15.07* 6.52       Evaluation of Dosing  Last Dose Received in the ED/Outside Facility:               N/A  Is Patient on Dialysis or Renal " "Replacement:               No    Ht - 175.3 cm (69.02\")  Wt - 77.1 kg (169 lb 15.6 oz)    Estimated Creatinine Clearance: 75.3 mL/min (by C-G formula based on SCr of 1.01 mg/dL).  Intake & Output (last 3 days)         08/24 0701 08/25 0700 08/25 0701 08/26 0700 08/26 0701 08/27 0700    I.V. (mL/kg)   1400 (18.2)    Total Intake(mL/kg)   1400 (18.2)    Urine (mL/kg/hr)   750    Drains   75    Blood   100    Total Output   925    Net   +475                   Microbiology and Radiology  Microbiology Results (last 10 days)       ** No results found for the last 240 hours. **            Reported Vancomycin Levels                   InsightRX AUC Calculation:  Current AUC:   -- mg/L*hr    Predicted Steady State AUC on Current Dose: -- mg/L*hr  _________________________________  Predicted Steady State AUC on New Dose:   457 mg/L*hr    Assessment/Plan:  1. Vancomycin 1500 mg IV q 24 hours    2. Vancomycin trough is scheduled 8/29 at 0600 prior to the 3rd dose. Predicted Steady State AUC at current dose: 457 mg/L*hr.      Nicholas Ibarra, PharmD, BCPS  8/27/2022  01:31 EDT  "

## 2022-08-27 NOTE — PLAN OF CARE
Goal Outcome Evaluation:  Plan of Care Reviewed With: patient        Progress: no change  Outcome Evaluation: VSS, room air. ERAS protocol in place. Levine draining dark yellow urine, no complaints of pain overnight. Antibiotics changed per hospitalist team overnight. Plan of care discussed with the patient.

## 2022-08-27 NOTE — PROGRESS NOTES
[facility]   Urology Progress Note    Patient Name: James Gary Meese  : 1950  MRN: [id number]  Primary Care Physician:  Breana Harden MD  Date of admission: 2022    Subjective   Subjective     Chief Complaint: Prostate cancer     HPI:  Patient Reports no issues overnight, pain is well controlled, he is ambulating, he is passing flatus.  He is tolerating clear liquid diet without nausea or vomiting.  His urine output is appropriate, 1450 mL for 24 hours, TOSHIA 125 mL.  We will continue to monitor his outputs.      Review of Systems   All systems were reviewed and negative except for: None    Objective   Objective     Vitals:   Temp:  [97 °F (36.1 °C)-98.2 °F (36.8 °C)] 97.6 °F (36.4 °C)  Heart Rate:  [70-98] 79  Resp:  [16-18] 16  BP: (102-143)/(63-88) 109/74  Flow (L/min):  [2-8] 2  Physical Exam    Constitutional: Awake in bed, alert   Eyes: PERRLA, sclerae anicteric, no conjunctival injection   HENT: Normocephalic, atraumatic, mucous membranes moist   Neck: Supple, trachea midline   Respiratory: Equal chest rise, non-labored respirations    Cardiovascular: RRR, palpable radial pulses bilaterally   Gastrointestinal: Soft, nontender, non-distended, robotic port site incisions well approximated, left lower quadrant TOSHIA with mild sanguinous output   Genitourinary: circumcised phallus, orthotopic meatus, bilaterally descended testicles without masses, Levine catheter in place draining clear yellow urine   Musculoskeletal: No lower extremity edema bilaterally, no clubbing or cyanosis to extremities   Psychiatric: Appropriate affect, cooperative   Neurologic: Oriented x 3,  Cranial Nerves grossly intact, speech clear   Skin: No rashes     Result Review    Result Review:  I have personally reviewed the results from the time of this admission to 2022 09:38 EDT and agree with these findings:  [x]  Laboratory  []  Microbiology  []  Radiology  []  EKG/Telemetry   []  Cardiology/Vascular   []   Pathology  []  Old records  []  Other:    Most notable findings include: Patient had postoperative lactic acidosis 5.8, 4.4 this morning.  He was placed on vancomycin and Zosyn secondary to increased lactic acidosis and white blood cell count for 14.8.  His urine output and TOSHIA output are appropriate.  He is afebrile and his vital signs are stable.    Assessment & Plan   Assessment / Plan     Brief Patient Summary:  James Gary Meese is a 71 y.o. male who underwent a robotic assisted laparoscopic prostatectomy 8- with right nerve sparing, left wide dissection.  Procedure was uncomplicated.  Urine output and TOSHIA drain outputs are appropriate.  He did have a lactic acidosis procedurally and postoperatively, internal medicine has ordered vancomycin and Zosyn for sepsis prophylaxis however this is completely unnecessary.  The patient has no signs of infection and his lactic acidosis is explained by his surgery and recovery.  The patient is asymptomatic at this time we will stop his IV fluids give him regular food and monitor.  We will likely remove his TOSHIA drain and discharge him this afternoon if he is doing well.  He is passing flatus.    Active Hospital Problems:  Active Hospital Problems    Diagnosis    • **Prostate cancer (HCC)    • Leukocytosis    • SCC (squamous cell carcinoma), face    • Melanoma in situ of torso excluding breast (HCC)    • Basal cell carcinoma (BCC) of skin of nose    • Hypertension    • Obstructive sleep apnea syndrome        Plan:     - Pain control: P.o. oxycodone, as needed Dilaudid, Tylenol  - Diet/FEN: Regular diet, saline lock IV fluid  - GI: bowel regimen  - : Maintain Levine catheter, antispasmodics as needed  - Heme/ID: Stop all antibiotics, no concern for infection  - PPx: Lovenox  - OOB/Amb  -Disposition: Home today         DVT prophylaxis:  Medical and mechanical DVT prophylaxis orders are present.    CODE STATUS:      Disposition:  I expect patient to discharge  today.    Electronically signed by Mushtaq Rudolph MD, 08/27/22, 9:38 AM EDT.

## 2022-08-27 NOTE — DISCHARGE SUMMARY
Date of Discharge:  8/27/2022    Discharge Diagnosis:   Prostate cancer    Problem List:  Active Hospital Problems    Diagnosis  POA   • **Prostate cancer (HCC) [C61]  Yes   • Leukocytosis [D72.829]  Yes   • SCC (squamous cell carcinoma), face [C44.320]  Yes   • Melanoma in situ of torso excluding breast (HCC) [D03.59]  Yes   • Basal cell carcinoma (BCC) of skin of nose [C44.311]  Yes   • Hypertension [I10]  Yes   • Obstructive sleep apnea syndrome [G47.33]  Yes      Resolved Hospital Problems   No resolved problems to display.       Presenting Problem/History of Present Illness  Prostate cancer (HCC) [C61]        Hospital Course  Patient is a 71 y.o. male presented with high-volume intermediate risk prostate cancer.  He was taken to the operating room 8- for robotic assisted laparoscopic prostatectomy, right nerve sparing, left wide dissection, bilateral pelvic lymphadenectomy.  Procedure was uncomplicated.  20 [place] Levine in place.  Left lower quadrant TOSHIA drain.  Urine output and TOSHIA output were appropriate.  Hemoglobin and creatinine remained stable.  Internal medicine was consulted, patient had expected inflammatory/surgical leukocytosis and they started broad-spectrum antibiotics which were quickly discontinued..  The patient remained afebrile and vital signs were stable.  He ambulated in the hallways and passed flatus and tolerated multiple regular food meals.  Catheter teaching was performed.  His left lower quadrant TOSHIA drain was removed on the day of his discharge.    Procedures Performed    Procedure(s):  ROBOTIC ASSISTED LAPAROSCOPIC PROSTATECTOMY, RIGHT NERVE SPARING, LEFT WIDE DISSECTION, BILATERAL PELVIC LYMPH NODE DISSECTION  -------------------       Consults:   Consults     Date and Time Order Name Status Description    8/26/2022  6:10 PM Inpatient Hospitalist Consult Completed           Pertinent Test Results: labs: Hemoglobin and creatinine stable    Condition on Discharge: Stable    Vital  Patient is smiling drinking her bottle, Mom is pushing fluids, she still has not voided   Signs  Temp:  [97 °F (36.1 °C)-98.2 °F (36.8 °C)] 97.6 °F (36.4 °C)  Heart Rate:  [70-98] 79  Resp:  [16] 16  BP: (102-135)/(63-88) 109/74    Discharge Disposition  Home or Self Care    Discharge Medications     Discharge Medications      New Medications      Instructions Start Date   bacitracin 500 UNIT/GM ointment   Apply to tip of penis and catheter twice daily      HYDROcodone-acetaminophen 5-325 MG per tablet  Commonly known as: Norco   1 tablet, Oral, Every 6 Hours PRN      Hyoscyamine Sulfate SL 0.125 MG sublingual tablet  Commonly known as: Levsin/SL   0.125 mg, Sublingual, Every 4 Hours PRN      nitrofurantoin (macrocrystal-monohydrate) 100 MG capsule  Commonly known as: Macrobid   100 mg, Oral, 2 Times Daily, Start taking the day prior to catheter removal to prevent infection   Start Date: August 31, 2022     polyethylene glycol 17 g packet  Commonly known as: MIRALAX   17 g, Oral, Daily      sennosides-docusate 8.6-50 MG per tablet  Commonly known as: PERICOLACE   1 tablet, Oral, 2 Times Daily         Continue These Medications      Instructions Start Date   CHOLESTOFF PO   1 capsule, Oral, Daily      diphenhydrAMINE 25 mg capsule  Commonly known as: BENADRYL   25 mg, Oral, Nightly PRN      hydroCHLOROthiazide 25 MG tablet  Commonly known as: HYDRODIURIL   25 mg, Oral, Daily      OCUVITE ADULT FORMULA PO   1 capsule, Oral, Daily             Discharge Diet   Diet Instructions     Diet: Regular      Discharge Diet: Regular          Activity at Discharge  Activity Instructions     Activity as Tolerated      Driving Restrictions      Type of Restriction: Driving    Driving Restrictions: No Driving While Taking Narcotics    Gradually Increase Activity Until at Pre-Hospitalization Level      Lifting Restrictions      Type of Restriction: Lifting    Lifting Restrictions: Lifting Restriction (Indicate Limit)    Weight Limit (Pounds): 25    Length of Lifting Restriction: 6 weeks          Follow-up  Appointments  Future Appointments   Date Time Provider Department Center   12/7/2022  8:30 AM Breana aHrden MD [facility]     Additional Instructions for the Follow-ups that You Need to Schedule     Call MD With Problems / Concerns   As directed      Instructions: Please contact your physician if you are experiencing the following symptoms after your procedure:    Fever >100 Farenheit, chills, nausea, vomiting, severe abdominal or chest pain, severe urinary bleeding (darker than red fruit punch), urinary clots, or unable to urinate for more than 8 hours.     Please call the [facility] mainline at [phone], the Urology office at [phone], or present to your nearest Emergency Department if you have ongoing concerns mentioned above.    Order Comments: Instructions: Please contact your physician if you are experiencing the following symptoms after your procedure:  Fever >100 Farenheit, chills, nausea, vomiting, severe abdominal or chest pain, severe urinary bleeding (darker than red fruit punch), urinary clots, or unable to urinate for more than 8 hours.  Please call the [facility] mainline at [phone], the Urology office at [phone], or present to your nearest Emergency Department if you have ongoing concerns mentioned above.          Discharge Follow-up with Specified Provider: Mushtaq Rudolph MD   As directed      To: Mushtaq Rudolph MD    Follow Up Details: Thursday 9/1/22 for catheter removal and postop               Test Results Pending at Discharge  Pending Labs     Order Current Status    Blood Culture - Blood, Blood, Venous Line In process    Blood Culture - Blood, Blood, Venous Line In process    Tissue Pathology Exam In process    Urine Culture - Urine, Urine, Clean Catch In process          Mushtaq Rudolph MD    Time: Discharge 25 min

## 2022-08-27 NOTE — PROGRESS NOTES
[facility] Medicine Services  PROGRESS NOTE    Patient Name: James Gary Meese  : 1950  MRN: [id number]    Date of Admission: 2022  Primary Care Physician: Breana Harden MD    Subjective   Subjective     CC:  Abdominal soreness    HPI:  Notes abdominal soreness. No f/c. No dyspnea. Slept poorly.    Review of Systems   Constitutional: Positive for activity change and fatigue. Negative for appetite change, chills, diaphoresis and fever.   HENT: Negative.    Respiratory: Negative.    Cardiovascular: Negative.    Gastrointestinal: Positive for abdominal pain.   Genitourinary: Negative.    Musculoskeletal: Negative.    Neurological: Negative.    Psychiatric/Behavioral: Positive for sleep disturbance.     Objective   Objective     Vital Signs:   Temp:  [97 °F (36.1 °C)-98.2 °F (36.8 °C)] 98.2 °F (36.8 °C)  Heart Rate:  [70-98] 79  Resp:  [16-18] 16  BP: (102-143)/(63-88) 102/63  Flow (L/min):  [2-8] 2     Physical Exam:  NAD, alert and oriented  OP clear, MMM  Neck supple  No LAD  RRR  CTAB  +BS, ND, TTP, but appropriately post-op  No c/c/e  Levine in place  SMILEY  Normal affect    Results Reviewed:  LAB RESULTS:      Lab 22  0259 22  2334 22  2045 22  1903 22  0937   WBC 14.80*  --   --  15.07* 6.52   HEMOGLOBIN 14.8  --   --  15.0 17.1   HEMATOCRIT 44.2  --   --  43.3 50.0   PLATELETS 275  --   --  266 260   NEUTROS ABS 13.38*  --   --   --   --    IMMATURE GRANS (ABS) 0.08*  --   --   --   --    LYMPHS ABS 0.47*  --   --   --   --    MONOS ABS 0.86  --   --   --   --    EOS ABS 0.00  --   --   --   --    MCV 91.3  --   --  88.9 90.9   PROCALCITONIN 0.11  --   --  0.07  --    LACTATE 4.4* 5.8* 2.6*  --   --          Lab 22  0259 22  1903 22  0937   SODIUM 135* 139  --    POTASSIUM 3.8 3.5 4.1   CHLORIDE 98 101  --    CO2 23.0 25.0  --    ANION GAP 14.0 13.0  --    BUN 18 18  --    CREATININE 1.05 1.01  --    EGFR 76.8 80.5  --     GLUCOSE 149* 167*  --    CALCIUM 8.1* 8.6  --    HEMOGLOBIN A1C  --   --  5.50         Lab 08/27/22  0259   TOTAL PROTEIN 5.9*   ALBUMIN 3.70   GLOBULIN 2.2   ALT (SGPT) 14   AST (SGOT) 18   BILIRUBIN 0.6   ALK PHOS 52                     Brief Urine Lab Results  (Last result in the past 365 days)      Color   Clarity   Blood   Leuk Est   Nitrite   Protein   CREAT   Urine HCG        08/27/22 0223 Yellow   Cloudy   Large (3+)   Moderate (2+)   Negative   Trace                 Microbiology Results Abnormal     None          XR Chest 1 View    Result Date: 8/27/2022  EXAMINATION: XR CHEST 1 VW DATE: 8/27/2022 1:12 AM INDICATION:  Sepsis; COMPARISON:  None. FINDINGS: There is lucency under the right hemidiaphragm. Additional lucency seen throughout the upper abdomen. Gas distended stomach or bowel in the left upper quadrant. No focal consolidation, pleural effusion, or pneumothorax. Cardiomediastinal silhouette  unremarkable. No acute osseous abnormality.     Impression: 1.  Large pneumoperitoneum. In the absence of recent intra-abdominal surgery, bowel perforation would be diagnoses of exclusion. Critical Result called to KARIN FLOWERS at 8/27/2022 12:17 AM Mountain Time. She related that the patient recently had a laparoscopic prostatectomy. Electronically signed by:  Aniket Espinal M.D.  8/27/2022 12:18 AM Mountain Time    Peripheral Block    Result Date: 8/26/2022  Julio Byrnes CRNA     8/26/2022  4:38 PM Peripheral Block Patient reassessed immediately prior to procedure Patient location during procedure: OR Reason for block: at surgeon's request and post-op pain management Performed by CRNA/CAA: Lesia Siegel CRNASRNA: Meme Aquino SRNA Preanesthetic Checklist Completed: patient identified, IV checked, site marked, risks and benefits discussed, surgical consent, monitors and equipment checked, pre-op evaluation and timeout performed Prep: Pt Position: supine Sterile barriers:cap, gloves, sterile  barriers and mask Prep: ChloraPrep Patient monitoring: blood pressure monitoring, continuous pulse oximetry and EKG Procedure Sedation: yes Performed under: general Guidance:ultrasound guided Images:still images obtained, printed/placed on chart Laterality:Bilateral Block Type:TAP Injection Technique:single-shot Needle Type:short-bevel and echogenic Needle Gauge:20 G Resistance on Injection: none Medications Used: buprenorphine (BUPRENEX) injection, 0.3 mg dexamethasone sodium phosphate injection, 4 mg bupivacaine PF (MARCAINE) 0.25 % injection, 60 mL Med administered at 8/26/2022 1:07 PM Medications Comment:Block Injection:  LA dose divided between Right and Left block Post Assessment Injection Assessment: negative aspiration for heme, incremental injection and no paresthesia on injection Patient Tolerance:comfortable throughout block Complications:no Additional Notes   Under Ultrasound guidance, a BBraun 4inch 360 degree needle was advanced with Normal Saline hydro dissection of tissue.  The Internal Oblique and Transversus Abdominus muscles where visualized.  At or before the aponeurosis of Internal Oblique, local anesthetic spread was visualized in the Transversus Abdominus Plane. Injection was made incrementally with aspiration every 5 mls.  There was no  intravascular injection,  injection pressure was normal, there was no neural injection, and the procedure was completed without difficulty.  Thank You.           I have reviewed the medications:  Scheduled Meds:acetaminophen, 650 mg, Oral, Q6H   Or  acetaminophen, 650 mg, Oral, Q6H   Or  acetaminophen, 650 mg, Rectal, Q6H  bacitracin, 1 application, Topical, Q12H  enoxaparin, 40 mg, Subcutaneous, Daily  gabapentin, 100 mg, Oral, TID  hydroCHLOROthiazide, 25 mg, Oral, Daily  oxybutynin XL, 10 mg, Oral, Daily  piperacillin-tazobactam, 3.375 g, Intravenous, Q8H  [START ON 8/28/2022] vancomycin, 19.5 mg/kg, Intravenous, Q24H      Continuous Infusions:Pharmacy to  dose vancomycin,   sodium chloride, 100 mL/hr, Last Rate: 100 mL/hr (08/27/22 0241)      PRN Meds:.HYDROmorphone  •  hyoscyamine sulfate  •  ondansetron **OR** ondansetron  •  oxyCODONE  •  Pharmacy to dose vancomycin    Assessment & Plan   Assessment & Plan     Active Hospital Problems    Diagnosis  POA   • **Prostate cancer (HCC) [C61]  Yes   • Leukocytosis [D72.829]  Yes   • SCC (squamous cell carcinoma), face [C44.320]  Yes   • Melanoma in situ of torso excluding breast (HCC) [D03.59]  Yes   • Basal cell carcinoma (BCC) of skin of nose [C44.311]  Yes   • Hypertension [I10]  Yes   • Obstructive sleep apnea syndrome [G47.33]  Yes      Resolved Hospital Problems   No resolved problems to display.        Brief Hospital Course to date:  James Gary Meese is a 71 y.o. male with prostate cancer s/p prostatectomy    Prostate cancer  -s/p laparoscopic prostatectomy  -mobilize  -post-op care per urology    HTN  -monitor, on HCTZ    GERD  -Pepcid    DAGMAR  -NIPPV prn    Lactic acidosis  -monitoring, on IVF    Expected Discharge Location and Transportation: Home  Expected Discharge Date: 8/29    DVT prophylaxis:  Medical and mechanical DVT prophylaxis orders are present.     AM-PAC 6 Clicks Score (PT): 24 (08/26/22 4457)    CODE STATUS:   There are no questions and answers to display.       Earnest Hugo MD  08/27/22

## 2022-08-27 NOTE — SIGNIFICANT NOTE
Had lactic acidosis which increased. Changed fluids. Obtained blood cultures, procal. Added vanc and zosyn. Defer to attending in am.

## 2022-08-28 LAB — BACTERIA SPEC AEROBE CULT: NO GROWTH

## 2022-08-30 ENCOUNTER — LAB (OUTPATIENT)
Dept: LAB | Facility: HOSPITAL | Age: 72
End: 2022-08-30

## 2022-08-30 ENCOUNTER — OFFICE VISIT (OUTPATIENT)
Dept: INTERNAL MEDICINE | Facility: CLINIC | Age: 72
End: 2022-08-30

## 2022-08-30 VITALS
DIASTOLIC BLOOD PRESSURE: 80 MMHG | BODY MASS INDEX: 24.24 KG/M2 | WEIGHT: 164.2 LBS | SYSTOLIC BLOOD PRESSURE: 136 MMHG | OXYGEN SATURATION: 96 % | TEMPERATURE: 97.5 F | HEART RATE: 76 BPM | RESPIRATION RATE: 16 BRPM

## 2022-08-30 DIAGNOSIS — C61 PROSTATE CANCER: Primary | ICD-10-CM

## 2022-08-30 DIAGNOSIS — I10 PRIMARY HYPERTENSION: ICD-10-CM

## 2022-08-30 DIAGNOSIS — E87.1 HYPONATREMIA: ICD-10-CM

## 2022-08-30 DIAGNOSIS — E83.51 HYPOCALCEMIA: ICD-10-CM

## 2022-08-30 DIAGNOSIS — D72.828 OTHER ELEVATED WHITE BLOOD CELL (WBC) COUNT: ICD-10-CM

## 2022-08-30 LAB
ANION GAP SERPL CALCULATED.3IONS-SCNC: 11.7 MMOL/L (ref 5–15)
BASOPHILS # BLD AUTO: 0.05 10*3/MM3 (ref 0–0.2)
BASOPHILS NFR BLD AUTO: 0.6 % (ref 0–1.5)
BUN SERPL-MCNC: 15 MG/DL (ref 8–23)
BUN/CREAT SERPL: 14.9 (ref 7–25)
CALCIUM SPEC-SCNC: 10 MG/DL (ref 8.6–10.5)
CHLORIDE SERPL-SCNC: 101 MMOL/L (ref 98–107)
CO2 SERPL-SCNC: 29.3 MMOL/L (ref 22–29)
CREAT SERPL-MCNC: 1.01 MG/DL (ref 0.76–1.27)
CYTO UR: NORMAL
DEPRECATED RDW RBC AUTO: 37.8 FL (ref 37–54)
EGFRCR SERPLBLD CKD-EPI 2021: 80.5 ML/MIN/1.73
EOSINOPHIL # BLD AUTO: 0.3 10*3/MM3 (ref 0–0.4)
EOSINOPHIL NFR BLD AUTO: 3.5 % (ref 0.3–6.2)
ERYTHROCYTE [DISTWIDTH] IN BLOOD BY AUTOMATED COUNT: 11.9 % (ref 12.3–15.4)
GLUCOSE SERPL-MCNC: 93 MG/DL (ref 65–99)
HCT VFR BLD AUTO: 45.2 % (ref 37.5–51)
HGB BLD-MCNC: 15.6 G/DL (ref 13–17.7)
IMM GRANULOCYTES # BLD AUTO: 0.11 10*3/MM3 (ref 0–0.05)
IMM GRANULOCYTES NFR BLD AUTO: 1.3 % (ref 0–0.5)
LAB AP CASE REPORT: NORMAL
LAB AP CLINICAL INFORMATION: NORMAL
LYMPHOCYTES # BLD AUTO: 1.19 10*3/MM3 (ref 0.7–3.1)
LYMPHOCYTES NFR BLD AUTO: 13.8 % (ref 19.6–45.3)
MCH RBC QN AUTO: 30.2 PG (ref 26.6–33)
MCHC RBC AUTO-ENTMCNC: 34.5 G/DL (ref 31.5–35.7)
MCV RBC AUTO: 87.6 FL (ref 79–97)
MONOCYTES # BLD AUTO: 0.71 10*3/MM3 (ref 0.1–0.9)
MONOCYTES NFR BLD AUTO: 8.2 % (ref 5–12)
NEUTROPHILS NFR BLD AUTO: 6.25 10*3/MM3 (ref 1.7–7)
NEUTROPHILS NFR BLD AUTO: 72.6 % (ref 42.7–76)
NRBC BLD AUTO-RTO: 0 /100 WBC (ref 0–0.2)
PATH REPORT.FINAL DX SPEC: NORMAL
PATH REPORT.GROSS SPEC: NORMAL
PLATELET # BLD AUTO: 316 10*3/MM3 (ref 140–450)
PMV BLD AUTO: 9.9 FL (ref 6–12)
POTASSIUM SERPL-SCNC: 4.1 MMOL/L (ref 3.5–5.2)
RBC # BLD AUTO: 5.16 10*6/MM3 (ref 4.14–5.8)
SODIUM SERPL-SCNC: 142 MMOL/L (ref 136–145)
WBC NRBC COR # BLD: 8.61 10*3/MM3 (ref 3.4–10.8)

## 2022-08-30 PROCEDURE — 85025 COMPLETE CBC W/AUTO DIFF WBC: CPT | Performed by: INTERNAL MEDICINE

## 2022-08-30 PROCEDURE — 80048 BASIC METABOLIC PNL TOTAL CA: CPT | Performed by: INTERNAL MEDICINE

## 2022-08-30 PROCEDURE — 99214 OFFICE O/P EST MOD 30 MIN: CPT | Performed by: INTERNAL MEDICINE

## 2022-08-30 NOTE — PROGRESS NOTES
Subjective       James Gary Meese is a 71 y.o. male.     Chief Complaint   Patient presents with   • Prostate Cancer     HFU   • Hypertension       History obtained from the patient and chart review.      History of Present Illness     The patient is here for a hospital follow-up.  The patient was admitted to [facility] on 8/26/2022 and discharged 8/27/2022.  Records have been received and reviewed.  Admitting diagnosis was Prostate Cancer.  The patient underwent a Robotic Assisted Laparoscopic Prostatectomy (with right nerve sparing/left wide dissection/bilateral pelvic lymphadenectomy).  Surgeon was Dr. Rudolph.  The procedure was uncomplicated and the patient did well postoperatively.  The patient did have postoperative leukocytosis, necessitating a work-up for infection and a brief course of broad-spectrum antibiotics.  His left lower quadrant TOSHIA drain was removed on the day of his discharge.  He was discharged with a urinary catheter.    The patient reports blood pressure was stable during the hospitalization.  He is on HCTZ.   He denies chest pain, shortness of breath, GAVIN, palpitations, leg pain/swelling, fever, and chills.  There is no nausea, vomiting, or constipation.    Since discharge, the patient states he has been walking daily.  He reports only needing only 1 pain pill and is off laxatives.  He does have some right sided abdominal soreness.  He denies any drainage from his incisions.    The patient states the patient's nodes did not show any cancer and he was told by Dr. Rudolph that he felt he got it all.  He felt they got it all.  They are going to monitor PSA for now and hold on any additional treatment pending those results.    Labs: Pre-admission labs on 8/24/2022 showed a normal Hemoglobin A1c (5.5).  CBC was normal and Potassium was 4.1.  On 8/26/2022, CBC  was significant for an elevated white blood cell count (15.07).  H/H was normal.  BMP on same day showed an elevated  glucose (167), otherwise normal.  Procalcitonin was normal and lactate was slightly elevated (2.6).  Repeat lactate was 5.8.  On 8/27/2022, urinalysis showed large blood and moderate leuk esterase.  Microscopy showed TNTC red blood cell and 21-30 white blood cell.  Urine culture was no growth.  Lactate level had gone up to 4.4.  CMP was significant for an elevated glucose (149), a low sodium (135),  a low calcium (8.1),  and a low total protein (5.9).  CBC was significant for an elevated but improved white blood cell count (14.8).  Procalcitonin was normal.  Also on 8/27/2022,  blood cultures were no gross x 3 days so far x 2 samples.  Discharge lactate on 8/27/2022 was normal (2.0).    Imaging: On 8/27/2022, chest x-ray showed pneumoperitoneum, otherwise negative.    The patient is requesting a Cologuard order today for routine colon cancer screening.    Current Outpatient Medications on File Prior to Visit   Medication Sig Dispense Refill   • bacitracin 500 UNIT/GM ointment Apply to tip of penis and catheter twice daily 28 g 0   • hydroCHLOROthiazide (HYDRODIURIL) 25 MG tablet Take 1 tablet by mouth Daily. 30 tablet 5   • Hyoscyamine Sulfate SL (Levsin/SL) 0.125 MG sublingual tablet Place 1 tablet under the tongue Every 4 (Four) Hours As Needed (Breakthrough bladder spasms). 30 each 1   • Multiple Vitamins-Minerals (OCUVITE ADULT FORMULA PO) Take 1 capsule by mouth Daily.     • [START ON 8/31/2022] nitrofurantoin, macrocrystal-monohydrate, (Macrobid) 100 MG capsule Take 1 capsule by mouth 2 (Two) Times a Day for 2 days. Start taking the day prior to catheter removal to prevent infection 4 capsule 0   • Plant Sterols and Stanols (CHOLESTOFF PO) Take 1 capsule by mouth Daily.     • sennosides-docusate (senna-docusate sodium) 8.6-50 MG per tablet Take 1 tablet by mouth 2 (Two) Times a Day. 20 tablet 0   • diphenhydrAMINE (BENADRYL) 25 mg capsule Take 25 mg by mouth At Night As Needed for Sleep.     •  HYDROcodone-acetaminophen (Norco) 5-325 MG per tablet Take 1 tablet by mouth Every 6 (Six) Hours As Needed for Severe Pain . 10 tablet 0   • polyethylene glycol (MIRALAX) 17 GM/SCOOP powder Mix 17 g in 8 ounces of water and drink once Daily. 238 g 1     No current facility-administered medications on file prior to visit.       Current outpatient and discharge medications have been reconciled for the patient.  Reviewed by: Breana Harden MD        The following portions of the patient's history were reviewed and updated as appropriate: allergies, current medications, past family history, past medical history, past social history, past surgical history and problem list.    Review of Systems   Constitutional: Negative for chills and fever.   Respiratory: Negative for shortness of breath.    Cardiovascular: Negative for chest pain and leg swelling.   Gastrointestinal: Positive for abdominal pain. Negative for constipation, nausea and vomiting.         Objective       Blood pressure 136/80, pulse 76, temperature 97.5 °F (36.4 °C), temperature source Infrared, resp. rate 16, weight 74.5 kg (164 lb 3.2 oz), SpO2 96 %.  Body mass index is 24.24 kg/m².      Physical Exam  Vitals and nursing note reviewed.   Constitutional:       Appearance: He is normal weight.   Cardiovascular:      Rate and Rhythm: Normal rate and regular rhythm.      Heart sounds: Normal heart sounds. No murmur heard.  Pulmonary:      Effort: Pulmonary effort is normal.      Breath sounds: Normal breath sounds.   Musculoskeletal:      Right lower leg: No edema.      Left lower leg: No edema.      Comments:   No calf tenderness bilaterally.   Neurological:      Mental Status: He is alert.         Assessment / Plan:  Diagnoses and all orders for this visit:    1. Prostate cancer (HCC) (Primary)   Follow up per Urology.    2. Primary hypertension   Continue current medication(s) as noted in the history of present illness.    3. Other elevated white blood  cell (WBC) count  -     CBC & Differential    4. Hypocalcemia  -     Basic Metabolic Panel    5. Hyponatremia  -     Basic Metabolic Panel          BMI is within normal parameters. No other follow-up for BMI required.          Return for Next scheduled follow up.

## 2022-09-01 ENCOUNTER — OFFICE VISIT (OUTPATIENT)
Dept: UROLOGY | Facility: CLINIC | Age: 72
End: 2022-09-01

## 2022-09-01 VITALS — WEIGHT: 164 LBS | HEIGHT: 69 IN | BODY MASS INDEX: 24.29 KG/M2

## 2022-09-01 DIAGNOSIS — C61 PROSTATE CANCER: Primary | ICD-10-CM

## 2022-09-01 LAB
BACTERIA SPEC AEROBE CULT: NORMAL
BACTERIA SPEC AEROBE CULT: NORMAL

## 2022-09-01 NOTE — PROGRESS NOTES
Follow Up Office Visit      Patient Name: James Gary Meese  : 1950   MRN: [id number]     Chief Complaint:    Chief Complaint   Patient presents with   • Follow up   • Prostate cancer       Referring Provider: No ref. provider found    History of Present Illness: James Gary Meese is a 71 y.o. male who presents today for follow up of prostate cancer.     The patient has a past medical historyof DAGMAR on CPAP intermittently, HTN, prior open right inguinal hernia repair with mesh, who was originally evaluated in my clinic on 6/ for history of elevated PSA.  PSA original visit was 6.79, up from 3.2 in 2017.  Extended PBCG risk calculation demonstrated 45% chance of high-grade prostate cancer biopsy.  For further risk characterization he completed a 4K score which came back significantly elevated at 58%.     He was taken to the procedure room for prostate biopsy on 2022, this demonstrated 6 out of 13 cores positive for Lopez 3+4 equal 7, grade group 2, intermediate risk prostate cancer involving the entirety of the left side of his prostate.  Right side was benign on biopsy.      Patient elected to proceed with robotic prostatectomy, this was performed 2022, left wide dissection, right nerve sparing.  Procedure was uncomplicated.  Patient recovered well and was discharged on postoperative day 1.  He has developed some scrotal swelling and he returns today for catheter removal.    1) PERIPROSTATIC FAT, BIOPSY:  Mature fibroadipose tissue   No evidence of malignancy identified    2) PROSTATE, SEMINAL VESICLES, BILATERAL PELVIC LYMPH NODES, RADICAL PROSTATECTOMY:  Prostatic adenocarcinoma, Lopez score (4+3) = 7, with focal extraprostatic extension  Margins are negative  No metastatic tumor seen in eleven lymph nodes (0/11)  pT3a    200 cc of sterile water were instilled into the bladder, the patient was able to void to completion.    Discussed surgical pathology, slightly higher risk of  recurrence given P T3a disease with focal extraprostatic extension however we will assess postoperative PSA at 6 weeks.      Discussed expected stress urinary incontinence and erectile dysfunction which will improve.  Patient brought diapers today.    Subjective      Review of System: Review of Systems   Constitutional: Negative for chills, fatigue, fever and unexpected weight change.   HENT: Negative for sore throat.    Eyes: Negative for visual disturbance.   Respiratory: Negative for cough, chest tightness and shortness of breath.    Cardiovascular: Negative for chest pain and leg swelling.   Gastrointestinal: Negative for blood in stool, constipation, diarrhea, nausea, rectal pain and vomiting.   Genitourinary: Negative for decreased urine volume, difficulty urinating, dysuria, enuresis, flank pain, frequency, genital sores, hematuria and urgency.   Musculoskeletal: Negative for back pain and joint swelling.   Skin: Negative for rash and wound.   Neurological: Negative for seizures, speech difficulty, weakness and headaches.   Psychiatric/Behavioral: Negative for confusion, sleep disturbance and suicidal ideas. The patient is not nervous/anxious.       I have reviewed the ROS documented by my clinical staff, I have updated appropriately and I agree. Mushtaq Rudolph MD    I have reviewed and the following portions of the patient's history were updated as appropriate: past family history, past medical history, past social history, past surgical history and problem list.    Medications:     Current Outpatient Medications:   •  bacitracin 500 UNIT/GM ointment, Apply to tip of penis and catheter twice daily, Disp: 28 g, Rfl: 0  •  diphenhydrAMINE (BENADRYL) 25 mg capsule, Take 25 mg by mouth At Night As Needed for Sleep., Disp: , Rfl:   •  hydroCHLOROthiazide (HYDRODIURIL) 25 MG tablet, Take 1 tablet by mouth Daily., Disp: 30 tablet, Rfl: 5  •  HYDROcodone-acetaminophen (Norco) 5-325 MG per tablet, Take 1 tablet  "by mouth Every 6 (Six) Hours As Needed for Severe Pain ., Disp: 10 tablet, Rfl: 0  •  Hyoscyamine Sulfate SL (Levsin/SL) 0.125 MG sublingual tablet, Place 1 tablet under the tongue Every 4 (Four) Hours As Needed (Breakthrough bladder spasms)., Disp: 30 each, Rfl: 1  •  Multiple Vitamins-Minerals (OCUVITE ADULT FORMULA PO), Take 1 capsule by mouth Daily., Disp: , Rfl:   •  nitrofurantoin, macrocrystal-monohydrate, (Macrobid) 100 MG capsule, Take 1 capsule by mouth 2 (Two) Times a Day for 2 days. Start taking the day prior to catheter removal to prevent infection, Disp: 4 capsule, Rfl: 0  •  Plant Sterols and Stanols (CHOLESTOFF PO), Take 1 capsule by mouth Daily., Disp: , Rfl:   •  polyethylene glycol (MIRALAX) 17 GM/SCOOP powder, Mix 17 g in 8 ounces of water and drink once Daily., Disp: 238 g, Rfl: 1  •  sennosides-docusate (senna-docusate sodium) 8.6-50 MG per tablet, Take 1 tablet by mouth 2 (Two) Times a Day., Disp: 20 tablet, Rfl: 0    Allergies:   No Known Allergies       Objective     Physical Exam:   Vital Signs:   Vitals:    09/01/22 1025   Weight: 74.4 kg (164 lb)   Height: 175.3 cm (69.02\")   PainSc: 0-No pain     Body mass index is 24.2 kg/m².     Physical Exam  Constitutional:       Appearance: Normal appearance.   HENT:      Head: Normocephalic and atraumatic.      Nose: Nose normal.   Eyes:      Extraocular Movements: Extraocular movements intact.      Conjunctiva/sclera: Conjunctivae normal.      Pupils: Pupils are equal, round, and reactive to light.   Abdominal:      Comments: Robotic port site incisions well approximated with skin glue, no erythema or purulence, abdomen nondistended, nontender   Genitourinary:     Comments: Circumcised phallus, orthotopic meatus, bilaterally descended testicles without masses or lesions, mild dependent scrotal swelling, Levine catheter in place draining clear urine  Musculoskeletal:         General: Normal range of motion.      Cervical back: Normal range of motion " and neck supple.   Skin:     General: Skin is warm and dry.      Findings: No lesion or rash.   Neurological:      General: No focal deficit present.      Mental Status: He is alert and oriented to person, place, and time. Mental status is at baseline.   Psychiatric:         Mood and Affect: Mood normal.         Behavior: Behavior normal.         Labs:   Brief Urine Lab Results  (Last result in the past 365 days)      Color   Clarity   Blood   Leuk Est   Nitrite   Protein   CREAT   Urine HCG        08/27/22 0223 Yellow   Cloudy   Large (3+)   Moderate (2+)   Negative   Trace                 Urine Culture    Urine Culture 8/27/22   Urine Culture No growth              Lab Results   Component Value Date    GLUCOSE 93 08/30/2022    CALCIUM 10.0 08/30/2022     08/30/2022    K 4.1 08/30/2022    CO2 29.3 (H) 08/30/2022     08/30/2022    BUN 15 08/30/2022    CREATININE 1.01 08/30/2022    EGFRIFNONA 75 03/14/2017    BCR 14.9 08/30/2022    ANIONGAP 11.7 08/30/2022       Lab Results   Component Value Date    WBC 8.61 08/30/2022    HGB 15.6 08/30/2022    HCT 45.2 08/30/2022    MCV 87.6 08/30/2022     08/30/2022       Images:   CT Chest With Contrast Diagnostic    Result Date: 8/12/2022  Some typical arthrosis changes present involving the sternomanubrial joint, likely accounting for findings on bone scan. There is otherwise no distinct suspicious lytic or sclerotic osseous lesion as a correlate to further raise suspicion for osseous metastases. No acute findings are present.  This report was finalized on 8/12/2022 6:08 PM by Olegario Lopez.      NM Bone Scan Whole Body    Result Date: 8/3/2022  Foci of increased uptake at the sternomanubrial joint, as well as in the upper thoracic spine. A CT of the chest would be beneficial for further evaluation. Cannot exclude metastatic disease. Electronically Signed: Umberto Salas MD 8/3/2022 3:13 EDT    CT Abdomen Pelvis With Contrast    Result Date: 8/2/2022  1.No  findings metastatic disease to the abdomen or pelvis. 2.Mildly enlarged prostate gland. There is some high density along the left peripheral apex likely related to previous biopsy. 3.Mild hepatic steatosis. 4.Probable hemangioma left lateral lobe of liver. 5.Bilateral renal cysts. Electronically Signed: Ria Bennett MD 8/2/2022 20:01 EDT    XR Chest 1 View    Result Date: 8/27/2022  1.  Large pneumoperitoneum. In the absence of recent intra-abdominal surgery, bowel perforation would be diagnoses of exclusion. Critical Result called to KARIN FLOWERS at 8/27/2022 12:17 AM Mountain Time. She related that the patient recently had a laparoscopic prostatectomy. Electronically signed by:  Aniket Espinal M.D.  8/27/2022 12:18 AM Mountain Time      Measures:   Tobacco:   James Gary Meese  reports that he has never smoked. He has never used smokeless tobacco..      Assessment / Plan      Assessment/Plan:   71 y.o. male who presented today for follow up of unfavorable intermediate risk prostate cancer, status post robotic prostatectomy last week, surgical margins negative, P T3a, [place] 4+3 equal 7 disease, lymph nodes negative.  Catheter removed today, trial of void past.  Discussed rest incontinence and erectile dysfunction concerns.  We will repeat PSA in 6 to 8 weeks and follow-up afterwards.  Discussed he is a bit higher risk of recurrence and we will need to monitor the PSA closely.  Discussed preference to avoid radiation unless absolutely necessary.    Diagnoses and all orders for this visit:    1. Prostate cancer (HCC) (Primary)  -     PSA Diagnostic; Future       Follow Up:   Return in about 8 weeks (around 10/27/2022).    I spent approximately 20 minutes providing clinical care for this patient; including review of patient's chart and provider documentation, face to face time spent with patient in examination room (obtaining history, performing physical exam, discussing diagnosis and management options), placing orders,  and completing patient documentation.     Mushtaq Rudolph MD  [facility] [place]

## 2022-09-01 NOTE — PATIENT INSTRUCTIONS
Male Kegel Exercises to Improve Urinary Continence     1. During urination, attempt to stop your stream. The muscle used to stop urination is called the external urinary sphincter.    2. After you have correctly identified this muscle, you should follow a sphincter strengthening program outside of urination.    Follow this Schedule:  -- Slowly contract your sphincter muscle to a count of five. Slowly release to a count of five. Repeat 10 times. This is called a set.  -- It is important not to tense other muscles during this exercise. You're your abdominal (belly), thigh, and butt muscles relaxed while you are doing Kegel exercises.    -- Repeat a set of exercises at least four times daily or up to once every hour. To help you remember a time schedule, do a set at breakfast, lunch, dinner and bedtime. Vary the position in which you do the exercises such as standing, lying, walking, and driving. Different positions will strengthen the muscles in different ways.    -- To help prevent leakage with increased abdominal pressure, you may do a single Kegel squeeze and count of five when you are bending, stooping, getting up out of a chair, or bed or when lifting.    -- It is normal to experience leakage for several weeks or even several months after surgery. Time, patience, and Kegel exercises will help restore your urinary control.    -- Guards for Men ([employer]) is a pad that fits into your jockey shorts to absorb small to moderate amounts of urine. It has an adhesive strip which adheres to the inside of your underwear. You may purchase these pads at any major pharmacy or discount store. For large amounts of leakage, you may wish to purchase Depends underwear. It's a full brief that you use in place of underwear.    Male Kegel Exercises after Surgery    Improve Bladder Control. Regain your continence sooner after a robotic prostatectomy.  Kegel exercise strengthens the group of muscles called the pelvic floor  muscles (also known as PC muscles which stand for pubococcygeus.) These muscles contract and relax around the bladder and the bladder opening at your command.  When these muscles are weak, urine leakage may result. You can exercise these muscles just like any other muscle in your body, and building them up may help reduce your symptoms. It is important that you perform these exercises correctly and consistently to gain maximum benefit after prostate cancer surgery.  Post-Operative Instructions: Finding the Pelvic Muscles  It is important that you exercise the correct muscles, to find them:    * Tighten your rectum as if you are trying to control passing gas or pinching off a stool. Do not tense the muscles of your legs, buttocks, or abdomen. Do not hold your breath.  * You can also imagine that you are trying to stop the flow of urine. If you still cannot find these muscles, you might try actually urinating and then trying to slow or stop the flow of urine midstream without using leg, buttocks or abdominal muscles.  * When you find these muscles, you will feel the muscles pulling upward and inward. Your penis and testicles also will move up and down slightly as you contract and relax.  * If you still are unsure if you are using the proper muscles, or if symptoms are not improving, contact your physician for more help.    How to Do the MALE Kegel Exercises    *Male Kegel exercise is best done after emptying your bladder.  * Tighten the muscles you located above and hold for 3 to 5 seconds, or as long as you can at first. As these muscles get stronger, you will able to hold them longer.  * Relax for 3 to 5 seconds or for as long as you tightened the muscles, then repeat.  * Breathe normally.  * Do 5 to 7 exercises at a time, 3 times a day minimum. As you get stronger, increase up to 15 exercises at a time, 4 times a day.  * In addition for more advanced exercises, you might consider incorporating a series of quick  flexes (1 second) into this routine of long flexes. For example, perform 30 quick (1 second flexes) rapidly. Then 1 long contraction for as long as you can. Then repeat. Add more repetitions as you get stronger.  * The key, as with any physical training, is to set up a consistent routine and to perform the exercise properly.    When to Exercise the MALE Kegel Muscles  * You can perform male Kegel exercises anywhere at any time. People around you will not even know you are doing them. Take advantage of this by incorporating kegel exercises into your daily routine. For example, you can do them while driving a car, waiting in line, watching television, reading a book. Be sure to exercise daily. The more you do, the better.  * You can also do these when you have an incontinent episode. For example, if you are experiencing urinary leakage on the way to the bathroom, stop and do a few kegel exercises until the leakage stops and then continue to the bathroom. If you leak urine when you cough, tighten the muscles quickly as you cough to reduce leakage. This is your best method of reducing leakage at inconvenient times.    Things to Remember about Male Kegal Exercise  * Make sure you are ONLY using the pelvic muscles. When you are first beginning the male Kegal exercise, you may consider standing in front of a mirror with a hand or on your abdomen or buttocks to feel for movement. It is important that you do NOT use your abdominals, buttocks, or leg muscles.  * Remember to breathe normally while exercising. Kegel exercise does not involve holding your breath.  * Exercise takes time to strengthen these muscles, just as with any physical therapy. You should start noticing less leakage after 4-6 weeks of consistent daily exercise, and an even larger difference after 3 months. If you do not see an improvement, you may not be exercising the proper muscles. You should be keeping track of how many pads you use per day to monitor  your own progress.  * Pelvic muscle exercises also improve orgasmic function thereby speeding your return to potency. Richie these muscles can aid in squeezing more blood into the penis to improve erectile function.  * Be pro-active, do your kegel exercises faithfully and you will see results.

## 2022-09-26 ENCOUNTER — TELEPHONE (OUTPATIENT)
Dept: INTERNAL MEDICINE | Facility: CLINIC | Age: 72
End: 2022-09-26

## 2022-09-27 NOTE — TELEPHONE ENCOUNTER
Spoke to Alberto and spoke with Call [phone] [place]   He states he did not receive the cologuard order from 6/10/2022    Order was refaxed to [phone] and that Raudel should receive the kit in 7 days . If he does not advised him to call back to our office to follow up on .   Verbal understanding given

## 2022-09-29 ENCOUNTER — TELEPHONE (OUTPATIENT)
Dept: INTERNAL MEDICINE | Facility: CLINIC | Age: 72
End: 2022-09-29

## 2022-09-29 NOTE — TELEPHONE ENCOUNTER
Caller: Meese, James Gary    Relationship: Self    Best call back number:[phone]    What is the best time to reach you: ANYTIME    Who are you requesting to speak with (clinical staff, provider,  specific staff member): ISSA    Do you know the name of the person who called:     What was the call regarding: BLOOD PRESSURE MEDICATION     Do you require a callback: YES

## 2022-09-30 RX ORDER — METOPROLOL SUCCINATE 50 MG/1
50 TABLET, EXTENDED RELEASE ORAL NIGHTLY
Qty: 30 TABLET | Refills: 5 | Status: SHIPPED | OUTPATIENT
Start: 2022-09-30 | End: 2023-03-27

## 2022-09-30 NOTE — TELEPHONE ENCOUNTER
Water pill working against him. Had prostate surgery a few weeks ago. Having incontinence from the prostate surgery. And taking the water pill makes it much worse.     Patient is asking if there is another medication he can take for his blood pressure where he doesn't have to take the water pill also

## 2022-11-16 ENCOUNTER — OFFICE VISIT (OUTPATIENT)
Dept: UROLOGY | Facility: CLINIC | Age: 72
End: 2022-11-16

## 2022-11-16 ENCOUNTER — LAB (OUTPATIENT)
Dept: LAB | Facility: HOSPITAL | Age: 72
End: 2022-11-16

## 2022-11-16 VITALS — WEIGHT: 164 LBS | HEIGHT: 69 IN | BODY MASS INDEX: 24.29 KG/M2

## 2022-11-16 DIAGNOSIS — C61 PROSTATE CANCER: ICD-10-CM

## 2022-11-16 DIAGNOSIS — C61 PROSTATE CANCER: Primary | ICD-10-CM

## 2022-11-16 DIAGNOSIS — N39.3 SUI (STRESS URINARY INCONTINENCE), MALE: ICD-10-CM

## 2022-11-16 LAB — PSA SERPL-MCNC: <0.014 NG/ML (ref 0–4)

## 2022-11-16 PROCEDURE — 36415 COLL VENOUS BLD VENIPUNCTURE: CPT

## 2022-11-16 PROCEDURE — 99024 POSTOP FOLLOW-UP VISIT: CPT | Performed by: STUDENT IN AN ORGANIZED HEALTH CARE EDUCATION/TRAINING PROGRAM

## 2022-11-16 PROCEDURE — 84153 ASSAY OF PSA TOTAL: CPT

## 2022-11-16 NOTE — PROGRESS NOTES
Follow Up Office Visit      Patient Name: James Gary Meese  : 1950   MRN: [id number]     Chief Complaint:    Chief Complaint   Patient presents with   • Prostate Cancer       Referring Provider: No ref. provider found    History of Present Illness: James Gary Meese is a 71 y.o. male who presents today for follow up of prostate cancer.  Patient underwent robotic prostatectomy 2022, surgical pathology reveals Lopez 4+3 equal 7, grade group 3 prostate cancer with focal extraprostatic extension, surgical staging P T3a, NX M0.    The patient returns for 3-month postoperative follow-up, he has not completed PSA prior to today's visit.    Patient's primary complaint is stress urinary incontinence, he is using roughly 4-5 diapers per day with fairly consistent dripping leakage, he has been trying to perform at home Kegel exercises but still struggling with his leakage.  Denies any urinary difficulties and otherwise his stream strength is preserved.  Has healed well from a surgical perspective and surgical incisions without hernias.    The patient states his wife is struggling with some other medical issues and he is not interested in sexual activity at this time, he states he is unable to achieve any partial erection whatsoever.  He is not previously on PDE 5 inhibitors.     Subjective      Review of System: Review of Systems   Constitutional: Negative for chills, fatigue, fever and unexpected weight change.   HENT: Negative for sore throat.    Eyes: Negative for visual disturbance.   Respiratory: Negative for cough, chest tightness and shortness of breath.    Cardiovascular: Negative for chest pain and leg swelling.   Gastrointestinal: Negative for blood in stool, constipation, diarrhea, nausea, rectal pain and vomiting.   Genitourinary: Positive for enuresis. Negative for decreased urine volume, difficulty urinating, dysuria, flank pain, frequency, genital sores, hematuria and urgency.    Musculoskeletal: Negative for back pain and joint swelling.   Skin: Negative for rash and wound.   Neurological: Negative for seizures, speech difficulty, weakness and headaches.   Psychiatric/Behavioral: Negative for confusion, sleep disturbance and suicidal ideas. The patient is not nervous/anxious.       I have reviewed the ROS documented by my clinical staff, I have updated appropriately and I agree. Mushtaq Rudolph MD    I have reviewed and the following portions of the patient's history were updated as appropriate: past family history, past medical history, past social history, past surgical history and problem list.    Medications:     Current Outpatient Medications:   •  diphenhydrAMINE (BENADRYL) 25 mg capsule, Take 25 mg by mouth At Night As Needed for Sleep., Disp: , Rfl:   •  metoprolol succinate XL (Toprol XL) 50 MG 24 hr tablet, Take 1 tablet by mouth Every Night., Disp: 30 tablet, Rfl: 5  •  Multiple Vitamins-Minerals (OCUVITE ADULT FORMULA PO), Take 1 capsule by mouth Daily., Disp: , Rfl:   •  Plant Sterols and Stanols (CHOLESTOFF PO), Take 1 capsule by mouth Daily., Disp: , Rfl:     Allergies:   No Known Allergies    IPSS Questionnaire (AUA-7):  Over the past month…    1)  Incomplete Emptying:       How often have you had a sensation of not emptying you had the sensation of not emptying your bladder completely after you finished urinating?  2 - Less than half the time   2)  Frequency:       How often have you had the urinate again less than two hours after you finished urinating?  4 - More than half the time   3)  Intermittency:       How often have you found you stopped and started again several times when you urinated?   4 - More than half the time   4) Urgency:      How often have you found it difficult to postpone urination?  3 - About half the time   5) Weak Stream:      How often have you had a weak urinary stream?  2 - Less than half the time   6) Straining:       How often have you had  "to push or strain to begin urination?  2 - Less than half the time   7) Nocturia:      How many times did you most typically get up to urinate from the time you went to bed at night until the time you got up in the morning?  2 - 2 times   Total Score:  19   The International Prostate Symptom Score (IPSS) is used to screen, diagnose, track symptoms of benign prostatic hyperplasia (BPH).   0-7 (Mild Symptoms) 8-19 (Moderate) 20-35 (Severe)   Quality of Life (QoL):  If you were to spend the rest of your life with your urinary condition just the way it is now, how would you feel about that? 3-Mixed   Urine Leakage (Incontinence) 0-No Leakage     Sexual Health Inventory for Men (ISA)   Over the past 6 months:     1. How do you rate your confidence that you could get and keep an erection?  0 - No sexual activity    2. When you had erections with sexual  stimulation, how often were your erections hard enough for penetration (entering your partner)?  0 - No Sexual Activity    3. During sexual intercourse, how often were you able to maintain your erection after you had penetrated (entered) your partner?  0 - Did not attempt intercourse   4. During sexual intercourse, how difficult was it to maintain your erection to completion of intercourse?  0 - Did not attempt intercourse   5. When you attempted sexual intercourse, how often was it satisfactory for you?  0 - Did not attempt intercourse    Total Score: 0   The Sexual Health Inventory for Men further classifies ED severity with the following breakpoints:   1-7 (Severe ED) 8-11 (Moderate ED) 12-16 (Mild to Moderate ED) 17-21 (Mild ED)           Objective     Physical Exam:   Vital Signs:   Vitals:    11/16/22 0744   Weight: 74.4 kg (164 lb)   Height: 175.3 cm (69.02\")     Body mass index is 24.2 kg/m².     Physical Exam  Constitutional:       Appearance: Normal appearance.   HENT:      Head: Normocephalic and atraumatic.      Nose: Nose normal.   Eyes:      Extraocular " Movements: Extraocular movements intact.      Conjunctiva/sclera: Conjunctivae normal.      Pupils: Pupils are equal, round, and reactive to light.   Abdominal:      Comments: Robotic port site incisions well approximated, well-healed, no hernia, erythema or skin breakdown present   Musculoskeletal:         General: Normal range of motion.      Cervical back: Normal range of motion and neck supple.   Skin:     General: Skin is warm and dry.      Findings: No lesion or rash.   Neurological:      General: No focal deficit present.      Mental Status: He is alert and oriented to person, place, and time. Mental status is at baseline.   Psychiatric:         Mood and Affect: Mood normal.         Behavior: Behavior normal.         Labs:   Brief Urine Lab Results  (Last result in the past 365 days)      Color   Clarity   Blood   Leuk Est   Nitrite   Protein   CREAT   Urine HCG        08/27/22 0223 Yellow   Cloudy   Large (3+)   Moderate (2+)   Negative   Trace                 Urine Culture    Urine Culture 8/27/22   Urine Culture No growth              Lab Results   Component Value Date    GLUCOSE 93 08/30/2022    CALCIUM 10.0 08/30/2022     08/30/2022    K 4.1 08/30/2022    CO2 29.3 (H) 08/30/2022     08/30/2022    BUN 15 08/30/2022    CREATININE 1.01 08/30/2022    EGFRIFNONA 75 03/14/2017    BCR 14.9 08/30/2022    ANIONGAP 11.7 08/30/2022       Lab Results   Component Value Date    WBC 8.61 08/30/2022    HGB 15.6 08/30/2022    HCT 45.2 08/30/2022    MCV 87.6 08/30/2022     08/30/2022       Images:   CT Chest With Contrast Diagnostic    Result Date: 8/12/2022  Some typical arthrosis changes present involving the sternomanubrial joint, likely accounting for findings on bone scan. There is otherwise no distinct suspicious lytic or sclerotic osseous lesion as a correlate to further raise suspicion for osseous metastases. No acute findings are present.  This report was finalized on 8/12/2022 6:08 PM by  Olegario Lopez.      NM Bone Scan Whole Body    Result Date: 8/3/2022  Foci of increased uptake at the sternomanubrial joint, as well as in the upper thoracic spine. A CT of the chest would be beneficial for further evaluation. Cannot exclude metastatic disease. Electronically Signed: Umberto Salas MD 8/3/2022 3:13 EDT    CT Abdomen Pelvis With Contrast    Result Date: 8/2/2022  1.No findings metastatic disease to the abdomen or pelvis. 2.Mildly enlarged prostate gland. There is some high density along the left peripheral apex likely related to previous biopsy. 3.Mild hepatic steatosis. 4.Probable hemangioma left lateral lobe of liver. 5.Bilateral renal cysts. Electronically Signed: Ria Bennett MD 8/2/2022 20:01 EDT    XR Chest 1 View    Result Date: 8/27/2022  1.  Large pneumoperitoneum. In the absence of recent intra-abdominal surgery, bowel perforation would be diagnoses of exclusion. Critical Result called to KARIN FLOWERS at 8/27/2022 12:17 AM Mountain Time. She related that the patient recently had a laparoscopic prostatectomy. Electronically signed by:  Aniket Espinal M.D.  8/27/2022 12:18 AM Mountain Time      Measures:   Tobacco:   James Gary Meese  reports that he has never smoked. He has never used smokeless tobacco.      Assessment / Plan      Assessment/Plan:   71 y.o. male who presented today for follow up of grade group 3 prostate cancer with focal extraprostatic extension (P T3a NX M0), surgical margins negative, patient returns for 10-week follow-up today, he did not complete PSA prior.  Discussed the importance of obtaining his PSA, we will hope to see his PSA fall to undetectable levels.  We will also plan to see him back in 3 months with additional PSA testing.  We will likely continue to check PSA every 3 months for the first year and then every 6 months thereafter.  The patient is bothered by continued stress urinary continence as result of surgery, we will plan to refer him to pelvic for  physical therapy given at home Kegel exercises have not been working for him.  Patient is amenable to physical therapy referral.    Addendum  Patient completed PSA after clinic visit today, his PSA resulted at undetectable <0.014, I called the patient with the good news, he reports understanding.  We hopefully anticipate long-term cure and we will continue to monitor the PSA.    Diagnoses and all orders for this visit:    1. Prostate cancer (HCC) (Primary)  -     PSA Diagnostic; Future  -     Ambulatory Referral to Physical Therapy Evaluate and treat, Pelvic Floor    2. ACE (stress urinary incontinence), male  -     Ambulatory Referral to Physical Therapy Evaluate and treat, Pelvic Floor           Follow Up:   Return in about 3 months (around 2/16/2023).    I spent approximately 30 minutes providing clinical care for this patient; including review of patient's chart and provider documentation, face to face time spent with patient in examination room (obtaining history, performing physical exam, discussing diagnosis and management options), placing orders, and completing patient documentation.     Mushtaq Rudolph MD  [facility] [place]

## 2022-12-02 ENCOUNTER — TELEPHONE (OUTPATIENT)
Dept: INTERNAL MEDICINE | Facility: CLINIC | Age: 72
End: 2022-12-02

## 2022-12-02 NOTE — TELEPHONE ENCOUNTER
Received a fax [facility] for a refill on the HCTZ  This was found on the history list discontinued due to side effects  Nothing is in the allergy list    Left message for him to call back and see if he is able to tolerate this     See telephone message on 9/29/2022 where medication was discontinued due to it causing incontinence

## 2022-12-02 NOTE — TELEPHONE ENCOUNTER
Raudel called back and said he did not want the rx filled for the HCTZ   He will notify pharmacy to take off the med list   Allergy list updated

## 2022-12-07 ENCOUNTER — OFFICE VISIT (OUTPATIENT)
Dept: INTERNAL MEDICINE | Facility: CLINIC | Age: 72
End: 2022-12-07

## 2022-12-07 ENCOUNTER — LAB (OUTPATIENT)
Dept: LAB | Facility: HOSPITAL | Age: 72
End: 2022-12-07

## 2022-12-07 VITALS
RESPIRATION RATE: 18 BRPM | OXYGEN SATURATION: 95 % | TEMPERATURE: 98 F | WEIGHT: 175.8 LBS | HEART RATE: 62 BPM | DIASTOLIC BLOOD PRESSURE: 72 MMHG | SYSTOLIC BLOOD PRESSURE: 120 MMHG | BODY MASS INDEX: 25.95 KG/M2

## 2022-12-07 DIAGNOSIS — Z13.6 SCREENING FOR CARDIOVASCULAR CONDITION: ICD-10-CM

## 2022-12-07 DIAGNOSIS — C61 PROSTATE CANCER: ICD-10-CM

## 2022-12-07 DIAGNOSIS — I10 PRIMARY HYPERTENSION: Primary | ICD-10-CM

## 2022-12-07 DIAGNOSIS — G47.33 OBSTRUCTIVE SLEEP APNEA SYNDROME: ICD-10-CM

## 2022-12-07 DIAGNOSIS — I10 PRIMARY HYPERTENSION: ICD-10-CM

## 2022-12-07 PROBLEM — D72.829 LEUKOCYTOSIS: Status: RESOLVED | Noted: 2022-08-26 | Resolved: 2022-12-07

## 2022-12-07 LAB
ALBUMIN SERPL-MCNC: 4.4 G/DL (ref 3.5–5.2)
ALBUMIN/GLOB SERPL: 1.9 G/DL
ALP SERPL-CCNC: 63 U/L (ref 39–117)
ALT SERPL W P-5'-P-CCNC: 19 U/L (ref 1–41)
ANION GAP SERPL CALCULATED.3IONS-SCNC: 10.4 MMOL/L (ref 5–15)
AST SERPL-CCNC: 20 U/L (ref 1–40)
BASOPHILS # BLD AUTO: 0.06 10*3/MM3 (ref 0–0.2)
BASOPHILS NFR BLD AUTO: 0.8 % (ref 0–1.5)
BILIRUB SERPL-MCNC: 0.7 MG/DL (ref 0–1.2)
BUN SERPL-MCNC: 17 MG/DL (ref 8–23)
BUN/CREAT SERPL: 16.3 (ref 7–25)
CALCIUM SPEC-SCNC: 9.4 MG/DL (ref 8.6–10.5)
CHLORIDE SERPL-SCNC: 103 MMOL/L (ref 98–107)
CHOLEST SERPL-MCNC: 217 MG/DL (ref 0–200)
CO2 SERPL-SCNC: 27.6 MMOL/L (ref 22–29)
CREAT SERPL-MCNC: 1.04 MG/DL (ref 0.76–1.27)
DEPRECATED RDW RBC AUTO: 41.6 FL (ref 37–54)
EGFRCR SERPLBLD CKD-EPI 2021: 77.2 ML/MIN/1.73
EOSINOPHIL # BLD AUTO: 0.23 10*3/MM3 (ref 0–0.4)
EOSINOPHIL NFR BLD AUTO: 3.2 % (ref 0.3–6.2)
ERYTHROCYTE [DISTWIDTH] IN BLOOD BY AUTOMATED COUNT: 12.6 % (ref 12.3–15.4)
GLOBULIN UR ELPH-MCNC: 2.3 GM/DL
GLUCOSE SERPL-MCNC: 88 MG/DL (ref 65–99)
HCT VFR BLD AUTO: 50.3 % (ref 37.5–51)
HDLC SERPL-MCNC: 35 MG/DL (ref 40–60)
HGB BLD-MCNC: 17.1 G/DL (ref 13–17.7)
IMM GRANULOCYTES # BLD AUTO: 0.09 10*3/MM3 (ref 0–0.05)
IMM GRANULOCYTES NFR BLD AUTO: 1.3 % (ref 0–0.5)
LDLC SERPL CALC-MCNC: 146 MG/DL (ref 0–100)
LDLC/HDLC SERPL: 4.09 {RATIO}
LYMPHOCYTES # BLD AUTO: 1.46 10*3/MM3 (ref 0.7–3.1)
LYMPHOCYTES NFR BLD AUTO: 20.6 % (ref 19.6–45.3)
MCH RBC QN AUTO: 31 PG (ref 26.6–33)
MCHC RBC AUTO-ENTMCNC: 34 G/DL (ref 31.5–35.7)
MCV RBC AUTO: 91.3 FL (ref 79–97)
MONOCYTES # BLD AUTO: 0.56 10*3/MM3 (ref 0.1–0.9)
MONOCYTES NFR BLD AUTO: 7.9 % (ref 5–12)
NEUTROPHILS NFR BLD AUTO: 4.68 10*3/MM3 (ref 1.7–7)
NEUTROPHILS NFR BLD AUTO: 66.2 % (ref 42.7–76)
NRBC BLD AUTO-RTO: 0 /100 WBC (ref 0–0.2)
PLATELET # BLD AUTO: 273 10*3/MM3 (ref 140–450)
PMV BLD AUTO: 9.9 FL (ref 6–12)
POTASSIUM SERPL-SCNC: 4.3 MMOL/L (ref 3.5–5.2)
PROT SERPL-MCNC: 6.7 G/DL (ref 6–8.5)
RBC # BLD AUTO: 5.51 10*6/MM3 (ref 4.14–5.8)
SODIUM SERPL-SCNC: 141 MMOL/L (ref 136–145)
TRIGL SERPL-MCNC: 195 MG/DL (ref 0–150)
TSH SERPL DL<=0.05 MIU/L-ACNC: 2.44 UIU/ML (ref 0.27–4.2)
VLDLC SERPL-MCNC: 36 MG/DL (ref 5–40)
WBC NRBC COR # BLD: 7.08 10*3/MM3 (ref 3.4–10.8)

## 2022-12-07 PROCEDURE — 99214 OFFICE O/P EST MOD 30 MIN: CPT | Performed by: INTERNAL MEDICINE

## 2022-12-07 PROCEDURE — 85025 COMPLETE CBC W/AUTO DIFF WBC: CPT

## 2022-12-07 PROCEDURE — 80061 LIPID PANEL: CPT

## 2022-12-07 PROCEDURE — 84443 ASSAY THYROID STIM HORMONE: CPT

## 2022-12-07 PROCEDURE — 80053 COMPREHEN METABOLIC PANEL: CPT

## 2022-12-07 NOTE — PROGRESS NOTES
Subjective       James Gary Meese is a 72 y.o. male.     Chief Complaint   Patient presents with   • Hypertension     Recheck, fasting       History obtained from the patient.      History of Present Illness     He has mild DAGMAR, stable on as needed CPAP.    The patient was diagnosed with Prostate Cancer in May 2022.  He last saw Dr. Rudolph on 11/16/2022 (every 3 month follow-up).  PSA was < 0.014.  He was sent for pelvic floor PT due to urinary incontinence.  He denies any dysuria, hematuria, frequency, and urgency.      [facility]: The patient is here today for a follow-up visit.      His Hypertension has been stable   Medication: Toprol-XL  Side Effects: None.     Interval Events:   Blood pressure at home is running 130's / 70's.  On 5/10/2022, LDL was 130 and TG 85.     Symptoms:  Denies chest pain, shortness of breath, GAVIN, orthopnea, PND,  lower extremity edema, claudication, lightheadedness, and dizziness.    Associated Symptoms: Weight increased 6 pounds in the past 6 to 7 months.  Denies fatigue, headache, polyuria, polydipsia, myalgias, arthralgias, visual impairment, memory loss, concentration issues, and focal neurological deficit.     Lifestyle: He consumes a diverse and healthy diet.  He walks 4 miles several times per week.   Tobacco Use: Never a smoker.     Current Outpatient Medications on File Prior to Visit   Medication Sig Dispense Refill   • metoprolol succinate XL (Toprol XL) 50 MG 24 hr tablet Take 1 tablet by mouth Every Night. 30 tablet 5   • Multiple Vitamins-Minerals (OCUVITE ADULT FORMULA PO) Take 1 capsule by mouth Daily.     • Plant Sterols and Stanols (CHOLESTOFF PO) Take 1 capsule by mouth Daily.     • diphenhydrAMINE (BENADRYL) 25 mg capsule Take 25 mg by mouth At Night As Needed for Sleep.       No current facility-administered medications on file prior to visit.       Current outpatient and discharge medications have been reconciled for the  patient.  Reviewed by: Breana Harden MD        The following portions of the patient's history were reviewed and updated as appropriate: allergies, current medications, past family history, past medical history, past social history, past surgical history and problem list.    Review of Systems   Constitutional: Positive for unexpected weight change. Negative for fatigue.   Eyes: Negative for visual disturbance.   Respiratory: Negative for cough, shortness of breath and wheezing.    Cardiovascular: Negative for chest pain, palpitations and leg swelling.        No GAVIN, orthopnea, or claudication.   Gastrointestinal: Negative for abdominal pain, blood in stool, constipation, diarrhea, nausea and vomiting.        Denies melena.   Endocrine: Negative for polydipsia and polyuria.   Musculoskeletal: Negative for arthralgias and myalgias.   Neurological: Negative for dizziness, syncope, light-headedness and headaches.        No memory issues.   Psychiatric/Behavioral: Negative for decreased concentration.         Objective       Blood pressure 120/72, pulse 62, temperature 98 °F (36.7 °C), temperature source Temporal, resp. rate 18, weight 79.7 kg (175 lb 12.8 oz), SpO2 95 %.  Body mass index is 25.95 kg/m².      Physical Exam  Vitals and nursing note reviewed.   Constitutional:       Appearance: He is well-developed.      Comments: BMI greater than 25   Neck:      Thyroid: No thyroid mass or thyromegaly.      Vascular: No carotid bruit.   Cardiovascular:      Rate and Rhythm: Normal rate and regular rhythm.      Pulses: Normal pulses.      Heart sounds: Normal heart sounds. No murmur heard.    No friction rub. No gallop.   Pulmonary:      Effort: Pulmonary effort is normal.      Breath sounds: Normal breath sounds.   Abdominal:      General: Bowel sounds are normal. There is no distension or abdominal bruit.      Palpations: Abdomen is soft. There is no hepatomegaly, splenomegaly or mass.      Tenderness: There is no  abdominal tenderness.   Musculoskeletal:      Cervical back: Normal range of motion and neck supple.      Right lower leg: No edema.      Left lower leg: No edema.   Neurological:      Mental Status: He is alert.   Psychiatric:         Mood and Affect: Mood normal.         Assessment / Plan:  Diagnoses and all orders for this visit:    1. Primary hypertension (Primary)  -     Lipid Panel; Future  -     Comprehensive Metabolic Panel; Future  -     TSH; Future  -     CBC & Differential; Future   Continue current medication(s) as noted in the history of present illness.    2. Prostate cancer (HCC)   Follow-up per Urology.    3. Obstructive sleep apnea syndrome   Continue CPAP.  Discussed the importance of daily use to prevent long-term heart/lung/brain complications.    4. Screening for cardiovascular condition  -     CT Cardiac Calcium Score Without Dye; Future        Recommended Influenza and COVID-19 vaccines, at the pharmacy or in our office. The patient declined.  The patient was informed he could be hospitalized and die from Influenza and/or COVID-19 infection.          Return in about 6 months (around 6/7/2023) for schedule subseq Medicare Wellness Exam after 6/7/23.

## 2022-12-07 NOTE — PATIENT INSTRUCTIONS
I recommend Shingrix (new Shingles vaccine) and Hepatitis A vaccination at the pharmacy.    I also recommend the Influenza and Covid 19 bivalent booster vaccine in our office or at the pharmacy, as we discussed.

## 2022-12-13 ENCOUNTER — TREATMENT (OUTPATIENT)
Dept: PHYSICAL THERAPY | Facility: CLINIC | Age: 72
End: 2022-12-13

## 2022-12-13 DIAGNOSIS — M62.89 PELVIC FLOOR WEAKNESS, MALE: ICD-10-CM

## 2022-12-13 DIAGNOSIS — N39.3 SUI (STRESS URINARY INCONTINENCE), MALE: Primary | ICD-10-CM

## 2022-12-13 PROCEDURE — 97112 NEUROMUSCULAR REEDUCATION: CPT | Performed by: PHYSICAL THERAPIST

## 2022-12-13 PROCEDURE — 97162 PT EVAL MOD COMPLEX 30 MIN: CPT | Performed by: PHYSICAL THERAPIST

## 2022-12-13 NOTE — PROGRESS NOTES
Physical Therapy Initial Evaluation and Plan of Care  PHYSICAL THERAPY    [address], [address]; [place] [address]    Patient: James Gary Meese   : 1950  Diagnosis/ICD-10 Code:  ACE (stress urinary incontinence), male [N39.3]  Referring practitioner: Mushtaq Rudolph MD  Date of Initial Visit: 2022  Today's Date: 2022  Patient seen for 1 sessions      Subjective  Had prostatectomy in 2022. Had catheter in for about a week. Had a 3 month follow up with Dr. Rudolph because having a lot of leakage. Thought it would go better than that.       Chief Complaint:   Chief Complaint   Patient presents with   • Initial Evaluation      Functional Outcome Measure: IIQ    Pain  denies    Bladder function:  Depends daily - 10 per day; Feeling pretty saturated when changes them. If sitting and working or at night not moving having more issues. Seems to leak related to fluid consumption and moving around.   Urination at night: every 3 hours doesn't sleep well; feels doesn't require more than 5-6 hours.   Complete bladder voiding %: feels emptying  Frequency of urination: went before left and 20 minutes later went to go again; output doesn't always match urge.   Discomfort with urination: denies issues  Fluid irritants consumed daily: water 32 oz, orange juice and cranberry juice - about the same in juice; occasional beer      Bowel function:  Denies issues      Sexual activity  Sexually active: ED post-op      Diagnostics:    PMH:   Past Medical History:   Diagnosis Date   • Basal cell carcinoma (BCC) of skin of nose     Dx - s/p Mohs Surgery   • Benign prostatic hyperplasia    • Elevated PSA    • History of varicella    • Hypertension     Dx    • Melanoma in situ of torso excluding breast (HCC)     Dx    • Obstructive sleep apnea syndrome     Description: dx 9/10-mild, noncompliant with CPAP   • Osteoarthritis     Description: mild   • Prostate cancer (HCC)     Dx 2022-  Klaudia   • SCC (squamous cell carcinoma), face     Dx 7/20- right sideburn    • Simple renal cyst     Dx 10/7/15 by u/s (bilateral)        Surgical HX:   Past Surgical History:   Procedure Laterality Date   • APPENDECTOMY  1963   • INGUINAL HERNIA REPAIR Right 07/2013    with mesh   • MOHS SURGERY  01/20/2020    nasal BCC   • PROSTATECTOMY N/A 8/26/2022    Procedure: ROBOTIC ASSISTED LAPAROSCOPIC PROSTATECTOMY, RIGHT NERVE SPARING, LEFT WIDE DISSECTION, BILATERAL PELVIC LYMPH NODE DISSECTION;  Surgeon: Mushtaq Rudolph MD;  Location: [facility];  Service: Robotics - DaVinci;  Laterality: N/A;   • SKIN BIOPSY              Meds:   Current Outpatient Medications:   •  diphenhydrAMINE (BENADRYL) 25 mg capsule, Take 25 mg by mouth At Night As Needed for Sleep., Disp: , Rfl:   •  metoprolol succinate XL (Toprol XL) 50 MG 24 hr tablet, Take 1 tablet by mouth Every Night., Disp: 30 tablet, Rfl: 5  •  Multiple Vitamins-Minerals (OCUVITE ADULT FORMULA PO), Take 1 capsule by mouth Daily., Disp: , Rfl:   •  Plant Sterols and Stanols (CHOLESTOFF PO), Take 1 capsule by mouth Daily., Disp: , Rfl:      Occupation: works for [employer], travels for work and desk work          Objective    Verbal consent obtained for external pelvic exam/treatment with declined need for second person in room  Posture: forward head and shoulders  Palpation:  Supine:  Abdominals: mild substitution with attempts to contract PFM     Sidelying:      PELVIC FLOOR   External:               Sensation: intact B              Skin quality: no gross abnormalities     SEMG - supine: rest 0.8, avg 1.6, max 2.0              See flowsheet for details of treatment following evaluation.     Basic information was provided regarding pelvic floor anatomy, explanation of the function of the pelvic floor, and contribution to symptoms.      Assessment/Plan     The patient is a 72 y.o. male who presents to physical therapy today for urinary incontinence post-op  prostatectomy August 2022. Upon initial evaluation, the patient demonstrates the following impairments: significant decreased activation and endurance of pelvic floor mm with mild abdominal and gluteal mm substitution. Due to these impairments, the patient is unable to perform daily activities without incontinence. The patient would benefit from skilled pelvic PT services to address functional limitations and impairments and to improve patient quality of life.       Goals:   STG's: 4 weeks  • Patient will decrease bladder irritant consumption by 2 servings, replaced with water for improved bladder health.  • Patient will use no more than 5 Depends per day for progress toward LTG.   • Patient will report > 25% improvement in urinary symptoms for improved QOL  • Patient will be able to perform HEP with minimal verbal cues     LTG's: By discharge  • Patient will report >75% improvement in urinary symptoms for improved QOL   • Patient will be able to switch to pad usage, no more than 1-2 per day for improved QOL  • Patient will be independent with HEP     Plan  Therapy options: will be seen for skilled physical therapy services  Planned modality interventions: TENS, ultrasound, cryotherapy, thermotherapy (hydrocollator packs)  Planned therapy interventions: abdominal trunk stabilization, manual therapy, neuromuscular re-education, body mechanics training, flexibility, functional ROM exercises, gait training, home exercise program, joint mobilization, therapeutic activities, stretching, strengthening, spinal/joint mobilization, soft tissue mobilization and postural training, dry needling  Pt prognosis: fair  Frequency: weekly  Duration in visits: 7  Duration in weeks: 12  Treatment plan discussed with: patient    Treatment Time: 1105 - 1145  Timed:         Manual Therapy:    0     mins  46275;     Therapeutic Exercise:    0     mins  93006;     Neuromuscular Michael:    8    mins  49677;    Therapeutic Activity:     0      mins  94190;     Gait Trainin     mins  32402;     Ultrasound:     0     mins  15233;    Ionto                               0    mins   01442  Self Care                       0     mins   64144  Canalith Repos    0     mins 06488      Un-Timed:  Electrical Stimulation:    0     mins  70664 ( );  Dry Needling     0     mins self-pay  Traction     0     mins 37346  Low Eval     0     Mins  59274  Mod Eval     32     Mins  64123  High Eval                       0     Mins  66343  Re-Eval                           0    mins  54817        Timed Treatment:   8   mins   Total Treatment:     40   mins    PT SIGNATURE:   ALDEN Regalado License # [id number]    DATE TREATMENT INITIATED: 2022    Initial Certification  Certification Period: 3/13/2023  I certify that the therapy services are furnished while this patient is under my care.  The services outlined above are required by this patient, and will be reviewed every 90 days.       PHYSICIAN: Mushtaq Rudolph MD  NPI: [id number]                                           DATE: 2022     Please sign and return via fax to [phone]. Thank you, [facility].

## 2022-12-21 ENCOUNTER — TREATMENT (OUTPATIENT)
Dept: PHYSICAL THERAPY | Facility: CLINIC | Age: 72
End: 2022-12-21

## 2022-12-21 DIAGNOSIS — M62.89 PELVIC FLOOR WEAKNESS, MALE: ICD-10-CM

## 2022-12-21 DIAGNOSIS — N39.3 SUI (STRESS URINARY INCONTINENCE), MALE: Primary | ICD-10-CM

## 2022-12-21 DIAGNOSIS — C61 PROSTATE CANCER: ICD-10-CM

## 2022-12-21 PROCEDURE — 97112 NEUROMUSCULAR REEDUCATION: CPT | Performed by: PHYSICAL THERAPIST

## 2022-12-21 PROCEDURE — 97110 THERAPEUTIC EXERCISES: CPT | Performed by: PHYSICAL THERAPIST

## 2022-12-21 NOTE — PROGRESS NOTES
Physical Therapy Daily Treatment Note  PHYSICAL THERAPY     [facility], [address]; [place] [address]  Patient: James Gary Meese   : 1950  Diagnosis/ICD-10 Code:  ACE (stress urinary incontinence), male [N39.3]  Referring practitioner: Mushtaq Rudolph MD  Date of Initial Visit: Type: THERAPY  Noted: 2022  Today's Date: 2022  Patient seen for 2 sessions      Subjective   James Meese reports things were a little worse when first started HEP but better now. Not getting up as much at night now. When stands up and forgets to hold losing it.     Pain Rating (0-10): 0      Objective   verbal consent obtained for external pelvic exam/treatment with declined need for second person in room     SEMG - supine: rest 0.8, avg 4.0, max 8.3    See Exercise, Manual, and Modality Logs for complete treatment.         Assessment/Plan  Pt with significant improvement in PFM recruitment with sEMG today. He demonstrates mod fatigue with exercises requiring increased rest time. HEP progressed to include resistance in sitting. Will continue to progress as indicated to reduce incontinence    Progress per Plan of Care         1513/1558   Timed:         Manual Therapy:    0     mins  92358;     Therapeutic Exercise:    10     mins  63666;     Neuromuscular Michael:    33    mins  32467;    Therapeutic Activity:     0     mins  68246;     Gait Trainin     mins  35498;     Ultrasound:     0     mins  13971;    Ionto                               0    mins   86771  Self Care                       0     mins   84123  Canalith Repos               0    mins  92390    Un-Timed:  Electrical Stimulation:    0     mins  87289 ( );  Dry Needling     0     mins self-pay  Traction     0     mins 43819  Low Eval     0     Mins  26892  Mod Eval     0     Mins  94429  High Eval                       0     Mins  73483  Re-Eval                           0    mins  32032    Timed Treatment:   43   mins   Total  Treatment:     43   mins    Aston Wiggins, PT  [place] License # [id number]  Physical Therapist

## 2023-01-05 ENCOUNTER — TREATMENT (OUTPATIENT)
Dept: PHYSICAL THERAPY | Facility: CLINIC | Age: 73
End: 2023-01-05
Payer: MEDICARE

## 2023-01-05 DIAGNOSIS — M62.89 PELVIC FLOOR WEAKNESS, MALE: ICD-10-CM

## 2023-01-05 DIAGNOSIS — C61 PROSTATE CANCER: ICD-10-CM

## 2023-01-05 DIAGNOSIS — N39.3 SUI (STRESS URINARY INCONTINENCE), MALE: Primary | ICD-10-CM

## 2023-01-05 PROCEDURE — 97110 THERAPEUTIC EXERCISES: CPT | Performed by: PHYSICAL THERAPIST

## 2023-01-05 PROCEDURE — 97112 NEUROMUSCULAR REEDUCATION: CPT | Performed by: PHYSICAL THERAPIST

## 2023-01-05 NOTE — PROGRESS NOTES
Physical Therapy Daily Treatment Note  PHYSICAL THERAPY     [facility], [address]; [place] [address]  Patient: James Gary Meese   : 1950  Diagnosis/ICD-10 Code:  ACE (stress urinary incontinence), male [N39.3]  Referring practitioner: Mushtaq Rudolph MD  Date of Initial Visit: Type: THERAPY  Noted: 2022  Today's Date: 2023  Patient seen for 3 sessions      Subjective   James Meese reports had breakthrough yesterday morning. Got up and went to bathroom and voided a lot. Still dry that AM and able to wear the Depends that day. The only problem is up and down during the day.     Pain Rating (0-10): 0      Objective   verbal consent obtained for external pelvic exam/treatment with declined need for second person in room     SEMG - supine: rest 1.2, avg 6.5; max 13.1  Sitting: rest 0.8, 7.2 avg, max 10.9    See Exercise, Manual, and Modality Logs for complete treatment.         Assessment/Plan  Pt with significant decrease in overnight incontinence and Dpeends usage. He demonstrates increased recruitment of PFM with SEMG today. HEP progressed to include endurance vs resistance in seated and standing.  Will continue to progress strengthening as indicated to reduce incontinence for improved QOL.     Progress per Plan of Care            Timed:         Manual Therapy:    0     mins  75401;     Therapeutic Exercise:    8     mins  15709;     Neuromuscular Michael:    30    mins  44920;    Therapeutic Activity:     0     mins  90373;     Gait Trainin     mins  58522;     Ultrasound:     0     mins  16965;    Ionto                               0    mins   10686  Self Care                       0     mins   57662  Canalith Repos               0    mins  74461    Un-Timed:  Electrical Stimulation:    0     mins  60186 ( );  Dry Needling     0     mins self-pay  Traction     0     mins 87711  Low Eval     0     Mins  01740  Mod Eval     0     Mins  27145  High Eval                        0     Mins  89637  Re-Eval                           0    mins  97819    Timed Treatment:   40   mins   Total Treatment:     40   mins    Aston Wiggins, PT  [place] License # [id number]  Physical Therapist

## 2023-01-07 ENCOUNTER — APPOINTMENT (OUTPATIENT)
Dept: CT IMAGING | Facility: HOSPITAL | Age: 73
End: 2023-01-07

## 2023-01-17 ENCOUNTER — TREATMENT (OUTPATIENT)
Dept: PHYSICAL THERAPY | Facility: CLINIC | Age: 73
End: 2023-01-17
Payer: MEDICARE

## 2023-01-17 DIAGNOSIS — C61 PROSTATE CANCER: ICD-10-CM

## 2023-01-17 DIAGNOSIS — N39.3 SUI (STRESS URINARY INCONTINENCE), MALE: Primary | ICD-10-CM

## 2023-01-17 DIAGNOSIS — M62.89 PELVIC FLOOR WEAKNESS, MALE: ICD-10-CM

## 2023-01-17 PROCEDURE — 97112 NEUROMUSCULAR REEDUCATION: CPT | Performed by: PHYSICAL THERAPIST

## 2023-01-17 PROCEDURE — 97110 THERAPEUTIC EXERCISES: CPT | Performed by: PHYSICAL THERAPIST

## 2023-01-17 NOTE — PROGRESS NOTES
Re-Assessment / Progress Note  PHYSICAL THERAPY    [address] [facility], [address]; [place] [address]      Patient: James Gary Meese   : 1950  Diagnosis/ICD-10 Code:  ACE (stress urinary incontinence), male [N39.3]  Referring practitioner: Mushtaq Rudolph MD  Date of Initial Visit: Type: THERAPY  Noted: 2022  Today's Date: 2023  Patient seen for 4 sessions      Subjective:   James Meese reports: drinking 32 oz of water per day, a small amount of oj and tea; 1 beer occasionally; close to dry at night, 4 depends per 24 hours; fully saturated when changes. Feels doing 100% better than was but still room for improvement. Was just a free flow. Sit to stand is still problem. On way to the bathroom having leakage.     Subjective Questionnaire: IIQ  Clinical Progress: improved  Home Program Compliance: Yes  Treatment has included: therapeutic exercise, neuromuscular re-education and therapeutic activity    Objective   verbal consent obtained for external pelvic exam/treatment with declined need for second person in room     SEMG - supine: rest 1.2, avg 8.6; max 19.2  Sitting: rest 0.8, 10.6 avg, max 17  See flowsheet for details of treatment following reassessment.       Assessment/Plan  Progress to physical therapy goals is good.He reports improved volume and frequency of incontinence. He has met 4/4 short term goals and 0/3 long term goals. He will benefit from continued skilled physical therapy to address remaining impairments and functional limitations. Pt demonstrating increased recruitment of PFM with SEMG today.     Progress toward previous goals: Partially Met    STG's: 4 weeks  • Patient will decrease bladder irritant consumption by 2 servings, replaced with water for improved bladder health. - met  • Patient will use no more than 5 Depends per day for progress toward LTG. - met  • Patient will report > 25% improvement in urinary symptoms for improved QOL - met  • Patient will be able to  perform HEP with minimal verbal cues - met     LTG's: By discharge  • Patient will report >75% improvement in urinary symptoms for improved QOL   • Patient will be able to switch to pad usage, no more than 1-2 per day for improved QOL  • Patient will be independent with HEP      Recommendations: Continue as planned  Timeframe: 1 month  Prognosis to achieve goals: good    PT Signature: Aston Wiggins PT      Based upon review of the patient's progress and continued therapy plan, it is my medical opinion that James Meese should continue physical therapy treatment at [facility]  [address] [place] [id number] [phone].    Signature: __________________________________    PHYSICIAN: Mushtaq Rudolph MD  NPI: [id number]                                        0900/0940  Manual Therapy:    0     mins  00183;  Therapeutic Exercise:    10     mins  78210;     Neuromuscular Michael:    30    mins  67673;    Therapeutic Activity:     0     mins  09626;     Gait Trainin     mins  17611;     Ultrasound:     0     mins  11776;    Electrical Stimulation:    0     mins  36314 (MC );  Dry Needling     0     mins self-pay    Timed Treatment:   40   mins   Total Treatment:     40   mins

## 2023-01-31 ENCOUNTER — TREATMENT (OUTPATIENT)
Dept: PHYSICAL THERAPY | Facility: CLINIC | Age: 73
End: 2023-01-31
Payer: MEDICARE

## 2023-01-31 DIAGNOSIS — C61 PROSTATE CANCER: ICD-10-CM

## 2023-01-31 DIAGNOSIS — N39.3 SUI (STRESS URINARY INCONTINENCE), MALE: Primary | ICD-10-CM

## 2023-01-31 DIAGNOSIS — M62.89 PELVIC FLOOR WEAKNESS, MALE: ICD-10-CM

## 2023-01-31 PROCEDURE — 97112 NEUROMUSCULAR REEDUCATION: CPT | Performed by: PHYSICAL THERAPIST

## 2023-01-31 PROCEDURE — 97110 THERAPEUTIC EXERCISES: CPT | Performed by: PHYSICAL THERAPIST

## 2023-01-31 NOTE — PROGRESS NOTES
Physical Therapy Daily Treatment Note  PHYSICAL THERAPY    [address] [facility], [address]; [place] [address]  Patient: James Gary Meese   : 1950  Diagnosis/ICD-10 Code:  ACE (stress urinary incontinence), male [N39.3]  Referring practitioner: Mushtaq Rudolph MD  Date of Initial Visit: Type: THERAPY  Noted: 2022  Today's Date: 2023  Patient seen for 5 sessions      Subjective   James Meese reports last week started having more issues. Feels better today. Not as consistent with HEP. Reports increased stress with work.           Pain Rating (0-10): 0      Objective   verbal consent obtained for external pelvic exam/treatment with declined need for second person in room    SEMG - supine: rest 1.2, avg 9.2; max 17.7  Sitting: rest 0.8, 6.1 avg, max 9.4  Standing: rest 0.8, 8.3 avg, max 11.1     See Exercise, Manual, and Modality Logs for complete treatment.     Assessment/Plan  Patient with decreased compliance due to increased work stress and demands this week.  Over the last week he has had mild increase in urinary symptoms.  Patient demonstrates mild decrease in recruitment of pelvic floor muscles today.  He is to continue current HEP at this time and focus on longer holds to improve endurance.  We will see patient in 2 weeks and progress strengthening as indicated to decrease incontinence.    Progress per Plan of Care         0940/1023   Timed:         Manual Therapy:    0     mins  97530;     Therapeutic Exercise:    8     mins  86788;     Neuromuscular Michael:    35    mins  92566;    Therapeutic Activity:     0     mins  28826;     Gait Trainin     mins  57786;     Ultrasound:     0     mins  75138;    Ionto                               0    mins   93198  Self Care                       0     mins   61788  Canalith Repos               0    mins  39873    Un-Timed:  Electrical Stimulation:    0     mins  26541 ( );  Dry Needling     0     mins self-pay  Traction     0     mins  73553  Low Eval     0     Mins  73693  Mod Eval     0     Mins  15361  High Eval                       00     Mins  11139  Re-Eval                           0    mins  91331    Timed Treatment:   43   mins   Total Treatment:     43   mins    ALDEN Regalado License # [id number]  Physical Therapist

## 2023-02-09 ENCOUNTER — TELEPHONE (OUTPATIENT)
Dept: UROLOGY | Facility: CLINIC | Age: 73
End: 2023-02-09
Payer: MEDICARE

## 2023-02-09 DIAGNOSIS — C61 PROSTATE CANCER: Primary | ICD-10-CM

## 2023-02-09 NOTE — TELEPHONE ENCOUNTER
Called pt to let him know his psa lab order was in and that he could get it done at [facility]  He stated understanding and said he would get it done.

## 2023-02-09 NOTE — TELEPHONE ENCOUNTER
Caller: JAMES MEESE        Best call back number: [phone]    What orders are you requesting (i.e. lab or imaging): LAB    In what timeframe would the patient need to come in: 2/14/23    Where will you receive your lab/imaging services: [facility]    Additional notes: PT HAS APPT ON 2/14/23 AT [facility] AND WOULD LIKE TO GET HIS PSA LABS DONE THERE.  PLEASE PLACE ORDERS IN CHART AND NOTIFY PT ONCE COMPLETED.  JAKES

## 2023-02-14 ENCOUNTER — LAB (OUTPATIENT)
Dept: LAB | Facility: HOSPITAL | Age: 73
End: 2023-02-14
Payer: MEDICARE

## 2023-02-14 ENCOUNTER — TREATMENT (OUTPATIENT)
Dept: PHYSICAL THERAPY | Facility: CLINIC | Age: 73
End: 2023-02-14
Payer: MEDICARE

## 2023-02-14 DIAGNOSIS — C61 PROSTATE CANCER: ICD-10-CM

## 2023-02-14 DIAGNOSIS — N39.3 SUI (STRESS URINARY INCONTINENCE), MALE: Primary | ICD-10-CM

## 2023-02-14 DIAGNOSIS — M62.89 PELVIC FLOOR WEAKNESS, MALE: ICD-10-CM

## 2023-02-14 LAB — PSA SERPL-MCNC: <0.014 NG/ML (ref 0–4)

## 2023-02-14 PROCEDURE — 84153 ASSAY OF PSA TOTAL: CPT

## 2023-02-14 PROCEDURE — 97110 THERAPEUTIC EXERCISES: CPT | Performed by: PHYSICAL THERAPIST

## 2023-02-14 PROCEDURE — 36415 COLL VENOUS BLD VENIPUNCTURE: CPT

## 2023-02-14 PROCEDURE — 97112 NEUROMUSCULAR REEDUCATION: CPT | Performed by: PHYSICAL THERAPIST

## 2023-02-14 NOTE — PROGRESS NOTES
Discharge Summary  PHYSICAL THERAPY     [facility], [address]; [place] [address]           Patient: James Gary Meese   : 1950  Diagnosis/ICD-10 Code:  ACE (stress urinary incontinence), male [N39.3]  Referring practitioner: Mushtaq Rudolph MD  Date of Initial Visit: Type: THERAPY  Noted: 2022  Today's Date: 2023  Patient seen for 6 sessions      Subjective:   James Meese reports: using 4 pads per day changing due to freshness. Going to have blood draw to check PSA today. Less worry about symptoms now.   Not having leakage with getting up and walking around as much.     Subjective Questionnaire:IIQ  Clinical Progress: improved  Home Program Compliance: Yes  Treatment has included: therapeutic exercise, neuromuscular re-education and therapeutic activity      Objective   verbal consent obtained for external pelvic exam/treatment with declined need for second person in room   SEMG - supine: rest 1.2, avg 9.2; max 17.7  Sitting: rest 0.8, 6.1 avg, max 10.3  Standing: rest 0.8, 8.3 avg, max 11.1   See flowsheet for details of treatment following reassessment.   Assessment/Plan  Progress to physical therapy goals is good.He reports improved incontinence and pad usage. He has met 4/4 short term goals and 2/3 long term goals. He is appropriate for discharge at this time and has been instructed in maintenance HEP.   Progress toward previous goals: Partially Met    Goals:   STG's: 4 weeks  • Patient will decrease bladder irritant consumption by 2 servings, replaced with water for improved bladder health. - met  • Patient will use no more than 5 Depends per day for progress toward LTG. - met  • Patient will report > 25% improvement in urinary symptoms for improved QOL - met  • Patient will be able to perform HEP with minimal verbal cues - met     LTG's: By discharge  • Patient will report >75% improvement in urinary symptoms for improved QOL - met  • Patient will be able to switch to pad usage,  no more than 1-2 per day for improved QOL  • Patient will be independent with HEP - met  Recommendations: Discharge      PT Signature: Aston Wiggins PT      Based upon review of the patient's progress and continued therapy plan, it is my medical opinion that James Meese should discharge from physical therapy treatment at [facility]  [address] [place] [id number] [phone].    Signature: __________________________________  Mushtaq Rudolph MD    09/0940  Manual Therapy:    0     mins  73356;  Therapeutic Exercise:    8     mins  39102;     Neuromuscular Michael:    30    mins  76599;    Therapeutic Activity:     0     mins  61853;     Gait Trainin     mins  35208;     Ultrasound:     0     mins  83356;    Electrical Stimulation:    0     mins  70414 ( );  Dry Needling     0     mins self-pay    Timed Treatment:   38   mins   Total Treatment:     38   mins

## 2023-02-17 ENCOUNTER — OFFICE VISIT (OUTPATIENT)
Dept: UROLOGY | Facility: CLINIC | Age: 73
End: 2023-02-17
Payer: MEDICARE

## 2023-02-17 VITALS — WEIGHT: 175 LBS | BODY MASS INDEX: 25.92 KG/M2 | HEIGHT: 69 IN

## 2023-02-17 DIAGNOSIS — C61 PROSTATE CANCER: ICD-10-CM

## 2023-02-17 DIAGNOSIS — N39.3 SUI (STRESS URINARY INCONTINENCE), MALE: ICD-10-CM

## 2023-02-17 LAB
BILIRUB BLD-MCNC: NEGATIVE MG/DL
CLARITY, POC: CLEAR
COLOR UR: YELLOW
EXPIRATION DATE: NORMAL
GLUCOSE UR STRIP-MCNC: NEGATIVE MG/DL
KETONES UR QL: NEGATIVE
LEUKOCYTE EST, POC: NEGATIVE
Lab: NORMAL
NITRITE UR-MCNC: NEGATIVE MG/ML
PH UR: 7 [PH] (ref 5–8)
PROT UR STRIP-MCNC: NEGATIVE MG/DL
RBC # UR STRIP: NEGATIVE /UL
SP GR UR: 1.01 (ref 1–1.03)
UROBILINOGEN UR QL: NORMAL

## 2023-02-17 PROCEDURE — 99213 OFFICE O/P EST LOW 20 MIN: CPT | Performed by: STUDENT IN AN ORGANIZED HEALTH CARE EDUCATION/TRAINING PROGRAM

## 2023-02-17 PROCEDURE — 81003 URINALYSIS AUTO W/O SCOPE: CPT | Performed by: STUDENT IN AN ORGANIZED HEALTH CARE EDUCATION/TRAINING PROGRAM

## 2023-02-17 PROCEDURE — 51798 US URINE CAPACITY MEASURE: CPT | Performed by: STUDENT IN AN ORGANIZED HEALTH CARE EDUCATION/TRAINING PROGRAM

## 2023-02-17 NOTE — PROGRESS NOTES
Follow Up Office Visit      Patient Name: James Gary Meese  : 1950   MRN: [id number]     Chief Complaint:    Chief Complaint   Patient presents with   • ACE   • Prostate Cancer       Referring Provider: No ref. provider found    History of Present Illness: James Gary Meese is a 72 y.o. male who presents today for follow up of prostate cancer.  Mr. Meese underwent robotic prostatectomy 2022, surgical pathology reveals Lopez 4+3 equal 7, grade group 3 prostate cancer with focal extraprostatic extension, surgical staging pT3a, NX M0.    At last visit in November, patient's PSA was undetectable.  At that time he was using 4-5 diapers a day with consistent dripping leakage.  He was referred to pelvic floor physical therapy.  He now returns for 3-month visit, his PSA remains undetectable.  He states he is doing extremely well.  He has finished pelvic for physical therapy with Aston Wiggins and states this helped tremendously.  He is now using only 1-2 diapers a day and these are mildly wet.  He states he is dry at night.  He reports a strong stream.    Lab Results   Component Value Date    PSA <0.014 2023    PSA <0.014 2022    PSA 6.790 (H) 05/10/2022    PSA 3.200 2017     He has no subjective complaints today.    Subjective      Review of System: Review of Systems   Genitourinary: Positive for enuresis and urgency. Negative for decreased urine volume, difficulty urinating, dysuria, flank pain, frequency and hematuria.      I have reviewed the ROS documented by my clinical staff, I have updated appropriately and I agree. Mushtaq Rudolph MD    I have reviewed and the following portions of the patient's history were updated as appropriate: past family history, past medical history, past social history, past surgical history and problem list.    Medications:     Current Outpatient Medications:   •  diphenhydrAMINE (BENADRYL) 25 mg capsule, Take 25 mg by mouth At Night As Needed for  Sleep., Disp: , Rfl:   •  metoprolol succinate XL (Toprol XL) 50 MG 24 hr tablet, Take 1 tablet by mouth Every Night., Disp: 30 tablet, Rfl: 5  •  Multiple Vitamins-Minerals (OCUVITE ADULT FORMULA PO), Take 1 capsule by mouth Daily., Disp: , Rfl:   •  Plant Sterols and Stanols (CHOLESTOFF PO), Take 1 capsule by mouth Daily., Disp: , Rfl:     Allergies:   Allergies   Allergen Reactions   • Hydrochlorothiazide Other (See Comments)     Increased incontinence        IPSS Questionnaire (AUA-7):  Over the past month…    1)  Incomplete Emptying:       How often have you had a sensation of not emptying you had the sensation of not emptying your bladder completely after you finished urinating?  0 - Not at all   2)  Frequency:       How often have you had the urinate again less than two hours after you finished urinating?  4 - More than half the time   3)  Intermittency:       How often have you found you stopped and started again several times when you urinated?   1 - Less than 1 time in 5   4) Urgency:      How often have you found it difficult to postpone urination?  4 - More than half the time   5) Weak Stream:      How often have you had a weak urinary stream?  1 - Less than 1 time in 5   6) Straining:       How often have you had to push or strain to begin urination?  1 - Less than 1 time in 5   7) Nocturia:      How many times did you most typically get up to urinate from the time you went to bed at night until the time you got up in the morning?  2 - 2 times   Total Score:  13   The International Prostate Symptom Score (IPSS) is used to screen, diagnose, track symptoms of benign prostatic hyperplasia (BPH).   0-7 (Mild Symptoms) 8-19 (Moderate) 20-35 (Severe)   Quality of Life (QoL):  If you were to spend the rest of your life with your urinary condition just the way it is now, how would you feel about that? 4-Mostly Dissatisfied   Urine Leakage (Incontinence) 0-No Leakage     Sexual Health Inventory for Men (ISA)  "  Over the past 6 months:     1. How do you rate your confidence that you could get and keep an erection?  0 - No sexual activity    2. When you had erections with sexual  stimulation, how often were your erections hard enough for penetration (entering your partner)?  5 - Almost always or always    3. During sexual intercourse, how often were you able to maintain your erection after you had penetrated (entered) your partner?  5 - Almost always or always    4. During sexual intercourse, how difficult was it to maintain your erection to completion of intercourse?  5 - Not difficult    5. When you attempted sexual intercourse, how often was it satisfactory for you?  4 - Most times (much more than, half the time)    Total Score: 19   The Sexual Health Inventory for Men further classifies ED severity with the following breakpoints:   1-7 (Severe ED) 8-11 (Moderate ED) 12-16 (Mild to Moderate ED) 17-21 (Mild ED)      Post void residual bladder scan:   0 mL     Objective     Physical Exam:   Vital Signs:   Vitals:    02/17/23 0746   Weight: 79.4 kg (175 lb)   Height: 175.3 cm (69.02\")     Body mass index is 25.83 kg/m².     Physical Exam  Constitutional:       Appearance: Normal appearance.   HENT:      Head: Normocephalic and atraumatic.      Nose: Nose normal.   Eyes:      Extraocular Movements: Extraocular movements intact.      Conjunctiva/sclera: Conjunctivae normal.      Pupils: Pupils are equal, round, and reactive to light.   Abdominal:      Comments: Robotic port site incisions well approximated, no hernias   Musculoskeletal:         General: Normal range of motion.      Cervical back: Normal range of motion and neck supple.   Skin:     General: Skin is warm and dry.      Findings: No lesion or rash.   Neurological:      General: No focal deficit present.      Mental Status: He is alert and oriented to person, place, and time. Mental status is at baseline.   Psychiatric:         Mood and Affect: Mood normal.       "   Behavior: Behavior normal.         Labs:   Brief Urine Lab Results  (Last result in the past 365 days)      Color   Clarity   Blood   Leuk Est   Nitrite   Protein   CREAT   Urine HCG        02/17/23 0759 Yellow   Clear   Negative   Negative   Negative   Negative                 Urine Culture    Urine Culture 8/27/22   Urine Culture No growth              Lab Results   Component Value Date    GLUCOSE 88 12/07/2022    CALCIUM 9.4 12/07/2022     12/07/2022    K 4.3 12/07/2022    CO2 27.6 12/07/2022     12/07/2022    BUN 17 12/07/2022    CREATININE 1.04 12/07/2022    EGFRIFNONA 75 03/14/2017    BCR 16.3 12/07/2022    ANIONGAP 10.4 12/07/2022       Lab Results   Component Value Date    WBC 7.08 12/07/2022    HGB 17.1 12/07/2022    HCT 50.3 12/07/2022    MCV 91.3 12/07/2022     12/07/2022       Images:   No Images in the past 120 days found..    Measures:   Tobacco:   James Gary Meese  reports that he has never smoked. He has never used smokeless tobacco.    Assessment / Plan      Assessment/Plan:   72 y.o. male who presented today for follow up of unfavorable intermediate risk prostate cancer status post robotic prostatectomy, high risk surgical pathology with pT3 aN0 MX grade group 3 prostate cancer, status post robotic prostatectomy, bilateral pelvic lymph node dissection.  His PSA remains undetectable.  He is very happy to hear this.  He is very happy about his improvement regarding stress urinary incontinence after working with the pelvic floor physical therapist out of [facility].  He continues to improve and I will see him in about 6 months to check his progress and repeat a PSA.    Diagnoses and all orders for this visit:    1. Prostate cancer (HCC)  -     POC Urinalysis Dipstick, Automated  -     PSA Diagnostic; Future    2. ACE (stress urinary incontinence), male  -     POC Urinalysis Dipstick, Automated           Follow Up:   Return in about 6 months (around 8/17/2023) for Follow Up after  Labs.    I spent approximately 20 minutes providing clinical care for this patient; including review of patient's chart and provider documentation, face to face time spent with patient in examination room (obtaining history, performing physical exam, discussing diagnosis and management options), placing orders, and completing patient documentation.     Mushtaq Rudolph MD  [facility]

## 2023-03-27 RX ORDER — METOPROLOL SUCCINATE 50 MG/1
TABLET, EXTENDED RELEASE ORAL
Qty: 30 TABLET | Refills: 0 | Status: SHIPPED | OUTPATIENT
Start: 2023-03-27

## 2023-04-18 ENCOUNTER — HOSPITAL ENCOUNTER (OUTPATIENT)
Dept: CT IMAGING | Facility: HOSPITAL | Age: 73
Discharge: HOME | End: 2023-04-18
Admitting: INTERNAL MEDICINE
Payer: MEDICARE

## 2023-04-18 ENCOUNTER — TELEPHONE (OUTPATIENT)
Dept: INTERNAL MEDICINE | Facility: CLINIC | Age: 73
End: 2023-04-18
Payer: MEDICARE

## 2023-04-18 DIAGNOSIS — Z13.6 SCREENING FOR CARDIOVASCULAR CONDITION: ICD-10-CM

## 2023-04-18 DIAGNOSIS — R93.1 ELEVATED CORONARY ARTERY CALCIUM SCORE: Primary | ICD-10-CM

## 2023-04-18 PROCEDURE — 75571 CT HRT W/O DYE W/CA TEST: CPT

## 2023-04-18 NOTE — TELEPHONE ENCOUNTER
Raudel notified of results.  He is agreeable to the cardiology referral .  He has not been to a cardiologist before so is fine with what ever doctor Dr Harden recommends.  Verba understanding given

## 2023-04-18 NOTE — TELEPHONE ENCOUNTER
Call patient please.  His coronary artery calcium score was quite elevated.  I would like for him to see a cardiologist.  If he is agreeable, I will enter an order.

## 2023-04-18 NOTE — TELEPHONE ENCOUNTER
Order has been entered.  I would also recommend the patient's start on an aspirin 81 mg daily.  Please add to medication list.    I would also recommend starting a statin cholesterol medication.  If he is agreeable, I will send a prescription to the pharmacy and we can check his liver enzymes at his follow-up appointment in June 2023.

## 2023-04-19 PROBLEM — R93.1 ELEVATED CORONARY ARTERY CALCIUM SCORE: Status: ACTIVE | Noted: 2023-04-19

## 2023-04-19 RX ORDER — ATORVASTATIN CALCIUM 10 MG/1
10 TABLET, FILM COATED ORAL NIGHTLY
Qty: 30 TABLET | Refills: 5 | Status: SHIPPED | OUTPATIENT
Start: 2023-04-19

## 2023-04-19 NOTE — TELEPHONE ENCOUNTER
PATIENT CALLED IN, HUB RELAYED MESSAGE, PATIENT VERBALIZED UNDERSTANDING AND STATED THAT HE IS AGREEABLE WITH BEGINNING THE STATIN MEDICATION. REQUESTING THAT IT BE SENT TO [facility] ON [address]

## 2023-04-28 NOTE — PROGRESS NOTES
[facility] CARDIOLOGY MAIN [place]    New Patient Office Visit    Patient Name: James Gary Meese  : 1950   MRN: [id number]   Care Team: Patient Care Team:  Breana Harden MD as PCP - General  Breana Harden MD as PCP - Family Medicine  Mushtaq Rudolph MD as Consulting Physician (Urology)    Chief Complaint   Patient presents with   • Hypertension     HPI: James Gary Meese is a 72 y.o. male with a history of hypertension, hyperlipidemia, prostate cancer with prostatectomy in Aug who presents today for further evaluation and management of elevated coronary artery calcium score.     He has no known history of heart disease but had been put on metoprolol last year for hypertension as well as some palpitations.  After starting that he reports no further issues with palpitations.  He denies any dyspnea on exertion or chest pain with activity.  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     He does not have a specific exercise regimen however does walk regularly.  He will make a 4 mile round trip to the [facility] by his house and is able to carry groceries back without limitations.    At his recent PCP appointment he had been prescribed atorvastatin for high cholesterol as well as elevated coronary artery calcium score however had not started this yet.  He does say that he changed his diet a while ago, now only eating red meat on rare occasions and having more grains and salads while avoiding sodium.    Subjective   Review of Systems   Constitutional: Negative for activity change.   Respiratory: Negative for chest tightness and shortness of breath.    Cardiovascular: Negative for chest pain, palpitations and leg swelling.   Genitourinary: Positive for urinary incontinence.       Past Medical History:   Diagnosis Date   • Basal cell carcinoma (BCC) of skin of nose     Dx - s/p Mohs Surgery   • Benign prostatic hyperplasia    • Elevated coronary artery calcium  score     Dx 23- Coronary Calcium Score of 1013.4.   • Elevated PSA    • History of varicella    • Hypertension     Dx    • Melanoma in situ of torso excluding breast     Dx    • Obstructive sleep apnea syndrome     Description: dx 9/10-mild, noncompliant with CPAP   • Osteoarthritis     Description: mild   • Prostate cancer     Dx 2022- Klaudia   • SCC (squamous cell carcinoma), face     Dx - right sideburn    • Simple renal cyst     Dx 10/7/15 by u/s (bilateral)     Past Surgical History:   Procedure Laterality Date   • APPENDECTOMY     • INGUINAL HERNIA REPAIR Right 2013    with mesh   • MOHS SURGERY  2020    nasal BCC   • PROSTATECTOMY N/A 2022    Procedure: ROBOTIC ASSISTED LAPAROSCOPIC PROSTATECTOMY, RIGHT NERVE SPARING, LEFT WIDE DISSECTION, BILATERAL PELVIC LYMPH NODE DISSECTION;  Surgeon: Mushtaq Rudolph MD;  Location: [facility];  Service: Robotics - DaVinci;  Laterality: N/A;   • SKIN BIOPSY       Social History     Socioeconomic History   • Marital status:    • Number of children: 1   Tobacco Use   • Smoking status: Never   • Smokeless tobacco: Never   Vaping Use   • Vaping Use: Never used   Substance and Sexual Activity   • Alcohol use: Yes     Alcohol/week: 1.0 standard drink     Types: 1 Cans of beer per week     Comment: Weekly   • Drug use: Never   • Sexual activity: Not Currently     Partners: Female     Family History   Problem Relation Age of Onset   • Hypertension Mother          age 97   • Osteoarthritis Mother    • No Known Problems Father          age 94 sepsis   • Aortic aneurysm Neg Hx         AAA       Current Outpatient Medications:   •  atorvastatin (LIPITOR) 10 MG tablet, Take 1 tablet by mouth Every Night., Disp: 30 tablet, Rfl: 5  •  metoprolol succinate XL (TOPROL-XL) 50 MG 24 hr tablet, TAKE 1 TABLET BY MOUTH ONCE DAILY AT NIGHT, Disp: 30 tablet, Rfl: 0  •  Multiple Vitamins-Minerals (OCUVITE ADULT FORMULA PO), Take 1 capsule  "by mouth Daily., Disp: , Rfl:      Allergies   Allergen Reactions   • Hydrochlorothiazide Other (See Comments)     Increased incontinence      Objective     Vitals:    05/01/23 0818   BP: 134/72   BP Location: Right arm   Patient Position: Sitting   Pulse: 69   SpO2: 99%   Weight: 78.5 kg (173 lb)   Height: 175.3 cm (69\")     Body mass index is 25.55 kg/m².  Gen: well developed, sitting up on exam table, comfortable appearing  HEENT: MMM, sclera anicteric, conjunctiva normal, no carotid bruits  CV: regular rate, regular rhythm, no murmurs or rubs, normal S1, S2. 2+ radial and PT pulses  Pulm: RA, normal work of breathing, no wheezes, rales, rhonchi  Abd: soft, non-tender, non-distended, +bowel sounds  Ext: normal bulk for age, normal tone, no dependent edema  Neuro: alert, oriented, face symmetrical, moving all extremities well  Psych: normal mood, appropriate affect     Most recent PCP note, imaging tests, and labs reviewed.    4/18/23 - CT CAC Scoring  Agatston scores for individual coronary arteries are as follows:  Left main (LM): 0  Left anterior descending (LAD): 132.1  Left circumflex (CX): 141.8  Right coronary artery (RCA): 739.5  Total 1013.4    Labs:  Lab Results   Component Value Date    WBC 7.08 12/07/2022    HGB 17.1 12/07/2022    HCT 50.3 12/07/2022    MCV 91.3 12/07/2022     12/07/2022     Lab Results   Component Value Date    GLUCOSE 88 12/07/2022    BUN 17 12/07/2022    CREATININE 1.04 12/07/2022    EGFRIFNONA 75 03/14/2017    BCR 16.3 12/07/2022    K 4.3 12/07/2022    CO2 27.6 12/07/2022    CALCIUM 9.4 12/07/2022    ALBUMIN 4.40 12/07/2022    AST 20 12/07/2022    ALT 19 12/07/2022     Lab Results   Component Value Date    HGBA1C 5.50 08/24/2022     Lab Results   Component Value Date    CHOL 217 (H) 12/07/2022    TRIG 195 (H) 12/07/2022    HDL 35 (L) 12/07/2022     (H) 12/07/2022         ECG 12 Lead    Date/Time: 5/1/2023 8:59 AM  Performed by: Geovanni Finn MD  Authorized by: " Huma, Geovanni Sevilla MD   Comparison: compared with previous ECG from 8/24/2022  Similar to previous ECG  Rhythm: sinus rhythm  Rate: normal  BPM: 61  Conduction: conduction normal  ST Segments: ST segments normal  T flattening: V6, V5 and V4  QRS axis: normal  Other findings: non-specific ST-T wave changes    Clinical impression: non-specific ECG            Advance Care Planning   ACP discussion was declined by the patient. Patient has an advance directive in EMR which is still valid.        Assessment & Plan       ICD-10-CM ICD-9-CM   1. Elevated coronary artery calcium score  R93.1 414.00   2. Pure hypercholesterolemia  E78.00 272.0   3. Primary hypertension  I10 401.9      Preclinical ASCVD  Coronary calcium score approximately 90th percentile for age   - Agree with initiation of aspirin 81 mg daily, will increase atorvastatin to 40 mg daily   - Repeat lipid panel at next PCP follow-up in mid June   - Continue following blood pressure   - Continue dietary changes including decreased consumption of red meat and increased vegetables and whole grains    Hypertension   - Acceptable control today, continue to follow    Hyperlipidemia   - Atorvastatin 40 mg as above    Return in about 1 year (around 5/1/2024).    SAMRA Fnin MD  04/28/23    [facility]  [address]  [address] [place] [id number] [phone]

## 2023-05-01 ENCOUNTER — OFFICE VISIT (OUTPATIENT)
Dept: CARDIOLOGY | Facility: CLINIC | Age: 73
End: 2023-05-01
Payer: MEDICARE

## 2023-05-01 ENCOUNTER — PATIENT ROUNDING (BHMG ONLY) (OUTPATIENT)
Dept: CARDIOLOGY | Facility: CLINIC | Age: 73
End: 2023-05-01

## 2023-05-01 VITALS
HEART RATE: 69 BPM | OXYGEN SATURATION: 99 % | HEIGHT: 69 IN | DIASTOLIC BLOOD PRESSURE: 72 MMHG | WEIGHT: 173 LBS | BODY MASS INDEX: 25.62 KG/M2 | SYSTOLIC BLOOD PRESSURE: 134 MMHG

## 2023-05-01 DIAGNOSIS — E78.00 PURE HYPERCHOLESTEROLEMIA: ICD-10-CM

## 2023-05-01 DIAGNOSIS — I10 PRIMARY HYPERTENSION: ICD-10-CM

## 2023-05-01 DIAGNOSIS — R93.1 ELEVATED CORONARY ARTERY CALCIUM SCORE: Primary | ICD-10-CM

## 2023-05-01 RX ORDER — ATORVASTATIN CALCIUM 40 MG/1
40 TABLET, FILM COATED ORAL NIGHTLY
Qty: 30 TABLET | Refills: 11 | Status: SHIPPED | OUTPATIENT
Start: 2023-05-01

## 2023-05-01 RX ORDER — ASPIRIN 81 MG/1
81 TABLET ORAL DAILY
COMMUNITY

## 2023-05-01 NOTE — PROGRESS NOTES
"May 1, 2023    Hello, may I speak with James Gary Meese?  Yes.    My name is Tess GUEVARA      I am  with [facility]ARDIOLOGY MA[facility]  [address]  [facility] [address] [phone].    Before we get started may I verify your date of birth? 1950, Yes, correct.    I am calling to officially welcome you to our practice and ask about your recent visit. Is this a good time to talk? Yes.    Tell me about your visit with us. What things went well?  Everything.  Doctor explained things well.       We're always looking for ways to make our patients' experiences even better. Do you have recommendations on ways we may improve?  No.  Everything was great.  Clearly marked where to go; check in  was nice, MA was very careful when removing EKG leads and not \"tearing up my skin\", very compassionate.    Overall were you satisfied with your first visit to our practice? Yes.  Just a note, \"I was very amazed & in awe of how clean everything was a kuto to your Environmental Staff; they do a great job.  Coming into a hospital you really expect that, but, some places are really dirty; not your office or the main hospital setting; (which I was a previous patient of also).  I used your facilities & they were spotless to, very impressed.\"       I appreciate you taking the time to speak with me today. Is there anything else I can do for you? No.      Thank you, and have a great day.      "

## 2023-05-15 RX ORDER — METOPROLOL SUCCINATE 50 MG/1
50 TABLET, EXTENDED RELEASE ORAL NIGHTLY
Qty: 30 TABLET | Refills: 5 | Status: SHIPPED | OUTPATIENT
Start: 2023-05-15

## 2023-05-15 NOTE — TELEPHONE ENCOUNTER
Caller: MeeseRaudely    Relationship: Self    Best call back number: [phone]    Requested Prescriptions:   Requested Prescriptions     Pending Prescriptions Disp Refills   • metoprolol succinate XL (TOPROL-XL) 50 MG 24 hr tablet 30 tablet 0     Sig: Take 1 tablet by mouth Every Night.        Pharmacy where request should be sent: [address] [phone] [facility] [phone] FX     Last office visit with prescribing clinician: 12/7/2022   Last telemedicine visit with prescribing clinician: 4/18/2023   Next office visit with prescribing clinician: 6/14/2023     Additional details provided by patient: PATIENT IS COMPLETELY OUT. PATIENT DID NOT REALIZE THAT THERE WAS NOT A REFILL. HIS CARDIOLOGIST TOLD HIM TO SAY ON THIS MEDICATION.     Does the patient have less than a 3 day supply:  [x] Yes  [] No    Would you like a call back once the refill request has been completed: [] Yes [] No    If the office needs to give you a call back, can they leave a voicemail: [] Yes [] No    Sierra Larson Rep   05/15/23 09:46 EDT

## 2023-06-14 ENCOUNTER — OFFICE VISIT (OUTPATIENT)
Dept: INTERNAL MEDICINE | Facility: CLINIC | Age: 73
End: 2023-06-14
Payer: MEDICARE

## 2023-06-14 VITALS
DIASTOLIC BLOOD PRESSURE: 82 MMHG | SYSTOLIC BLOOD PRESSURE: 146 MMHG | HEIGHT: 69 IN | RESPIRATION RATE: 16 BRPM | WEIGHT: 169.5 LBS | BODY MASS INDEX: 25.1 KG/M2 | HEART RATE: 68 BPM | TEMPERATURE: 97.8 F

## 2023-06-14 DIAGNOSIS — M19.91 PRIMARY OSTEOARTHRITIS, UNSPECIFIED SITE: ICD-10-CM

## 2023-06-14 DIAGNOSIS — D03.59 MELANOMA IN SITU OF TORSO EXCLUDING BREAST: ICD-10-CM

## 2023-06-14 DIAGNOSIS — G47.33 OBSTRUCTIVE SLEEP APNEA SYNDROME: ICD-10-CM

## 2023-06-14 DIAGNOSIS — I10 PRIMARY HYPERTENSION: ICD-10-CM

## 2023-06-14 DIAGNOSIS — R93.1 ELEVATED CORONARY ARTERY CALCIUM SCORE: ICD-10-CM

## 2023-06-14 DIAGNOSIS — Z00.00 MEDICARE ANNUAL WELLNESS VISIT, INITIAL: Primary | ICD-10-CM

## 2023-06-14 DIAGNOSIS — C44.311 BASAL CELL CARCINOMA (BCC) OF SKIN OF NOSE: ICD-10-CM

## 2023-06-14 DIAGNOSIS — C61 PROSTATE CANCER: ICD-10-CM

## 2023-06-14 DIAGNOSIS — C44.320 SCC (SQUAMOUS CELL CARCINOMA), FACE: ICD-10-CM

## 2023-06-14 LAB
ALBUMIN SERPL-MCNC: 4.6 G/DL (ref 3.5–5.2)
ALBUMIN/GLOB SERPL: 2.3 G/DL
ALP SERPL-CCNC: 69 U/L (ref 39–117)
ALT SERPL W P-5'-P-CCNC: 18 U/L (ref 1–41)
ANION GAP SERPL CALCULATED.3IONS-SCNC: 11 MMOL/L (ref 5–15)
AST SERPL-CCNC: 21 U/L (ref 1–40)
BASOPHILS # BLD AUTO: 0.03 10*3/MM3 (ref 0–0.2)
BASOPHILS NFR BLD AUTO: 0.5 % (ref 0–1.5)
BILIRUB SERPL-MCNC: 0.8 MG/DL (ref 0–1.2)
BUN SERPL-MCNC: 19 MG/DL (ref 8–23)
BUN/CREAT SERPL: 17.9 (ref 7–25)
CALCIUM SPEC-SCNC: 9.4 MG/DL (ref 8.6–10.5)
CHLORIDE SERPL-SCNC: 107 MMOL/L (ref 98–107)
CHOLEST SERPL-MCNC: 133 MG/DL (ref 0–200)
CO2 SERPL-SCNC: 25 MMOL/L (ref 22–29)
CREAT SERPL-MCNC: 1.06 MG/DL (ref 0.76–1.27)
DEPRECATED RDW RBC AUTO: 40.3 FL (ref 37–54)
EGFRCR SERPLBLD CKD-EPI 2021: 75.5 ML/MIN/1.73
EOSINOPHIL # BLD AUTO: 0.16 10*3/MM3 (ref 0–0.4)
EOSINOPHIL NFR BLD AUTO: 2.7 % (ref 0.3–6.2)
ERYTHROCYTE [DISTWIDTH] IN BLOOD BY AUTOMATED COUNT: 12.2 % (ref 12.3–15.4)
GLOBULIN UR ELPH-MCNC: 2 GM/DL
GLUCOSE SERPL-MCNC: 87 MG/DL (ref 65–99)
HCT VFR BLD AUTO: 47.6 % (ref 37.5–51)
HDLC SERPL-MCNC: 36 MG/DL (ref 40–60)
HGB BLD-MCNC: 16.2 G/DL (ref 13–17.7)
IMM GRANULOCYTES # BLD AUTO: 0.03 10*3/MM3 (ref 0–0.05)
IMM GRANULOCYTES NFR BLD AUTO: 0.5 % (ref 0–0.5)
LDLC SERPL CALC-MCNC: 82 MG/DL (ref 0–100)
LDLC/HDLC SERPL: 2.29 {RATIO}
LYMPHOCYTES # BLD AUTO: 1.14 10*3/MM3 (ref 0.7–3.1)
LYMPHOCYTES NFR BLD AUTO: 19.2 % (ref 19.6–45.3)
MCH RBC QN AUTO: 31.2 PG (ref 26.6–33)
MCHC RBC AUTO-ENTMCNC: 34 G/DL (ref 31.5–35.7)
MCV RBC AUTO: 91.5 FL (ref 79–97)
MONOCYTES # BLD AUTO: 0.47 10*3/MM3 (ref 0.1–0.9)
MONOCYTES NFR BLD AUTO: 7.9 % (ref 5–12)
NEUTROPHILS NFR BLD AUTO: 4.12 10*3/MM3 (ref 1.7–7)
NEUTROPHILS NFR BLD AUTO: 69.2 % (ref 42.7–76)
NRBC BLD AUTO-RTO: 0 /100 WBC (ref 0–0.2)
PLATELET # BLD AUTO: 266 10*3/MM3 (ref 140–450)
PMV BLD AUTO: 9.8 FL (ref 6–12)
POTASSIUM SERPL-SCNC: 4.4 MMOL/L (ref 3.5–5.2)
PROT SERPL-MCNC: 6.6 G/DL (ref 6–8.5)
RBC # BLD AUTO: 5.2 10*6/MM3 (ref 4.14–5.8)
SODIUM SERPL-SCNC: 143 MMOL/L (ref 136–145)
TRIGL SERPL-MCNC: 73 MG/DL (ref 0–150)
TSH SERPL DL<=0.05 MIU/L-ACNC: 2.31 UIU/ML (ref 0.27–4.2)
VLDLC SERPL-MCNC: 15 MG/DL (ref 5–40)
WBC NRBC COR # BLD: 5.95 10*3/MM3 (ref 3.4–10.8)

## 2023-06-14 PROCEDURE — 84443 ASSAY THYROID STIM HORMONE: CPT | Performed by: INTERNAL MEDICINE

## 2023-06-14 PROCEDURE — 85025 COMPLETE CBC W/AUTO DIFF WBC: CPT | Performed by: INTERNAL MEDICINE

## 2023-06-14 PROCEDURE — 80053 COMPREHEN METABOLIC PANEL: CPT | Performed by: INTERNAL MEDICINE

## 2023-06-14 PROCEDURE — 80061 LIPID PANEL: CPT | Performed by: INTERNAL MEDICINE

## 2023-06-14 RX ORDER — ATORVASTATIN CALCIUM 10 MG/1
10 TABLET, FILM COATED ORAL DAILY
COMMUNITY

## 2023-06-14 NOTE — PROGRESS NOTES
Subjective       James Gary Meese is a 72 y.o. male.     Chief Complaint   Patient presents with    Medicare Wellness-subsequent       History obtained from the patient.      History of Present Illness      The patient was diagnosed with Prostate Cancer in May 2022.  He last saw Dr. Rudolph on 2/17/2023.  Urinalysis was negative.  PSA was < 0.014 on 2/14/2023.  He has had pelvic floor PT due to urinary incontinence, which is improving.  He denies any dysuria, hematuria, frequency, urgency, and problems with the urinary stream.      He has mild DAGMAR, stable on as needed CPAP.    He has mild Osteoarthritis, stable on no medication.    The patient was diagnosed with BCC of the nose (s/p Mohs Procedure) in January 2020, Melanoma of the chest in July 2020, and SCC of the right face in July 2020.  He last saw Dermatology KARIN Matta on 8/4/2022.     Primary Care Cardiac Diagnostic Constellation: The patient is here today for a follow-up visit.      His Hypertension has been stable   Medication: Toprol-XL  Side Effects: None.    Comorbid Illness: Coronary Artery Calcification, on Aspirin and Atorvastatin.    Procedures: On 4/18/2023, Coronary Artery Calcium Score was 1013.4.  He has not had a cardiac stress test.     Interval Events:   On 12/7/2022,, LDL was 146 and .  The patient was started on Aspirin and Atorvastatin after his Cardiac CT Scan on 4/18/2023.  He saw Dr. Finn on 5/1/2023.  His Atorvastatin was increased to 40 mg daily, but the patient states he remained on 10 mg daily.  He had some arthralgias after starting Atorvastatin, which are resolving.     Symptoms:  Denies chest pain, shortness of breath, GAVIN, orthopnea, PND, lower extremity edema, claudication, lightheadedness, and dizziness.    Associated Symptoms: Weight decreased 6 intentionally pounds in the past 6 months.  Denies fatigue, headache, polyuria, polydipsia, myalgias, arthralgias, visual impairment, memory loss, or concentration  issues.     Lifestyle: He consumes a diverse and healthy diet.  He walks/runs daily.   Tobacco Use: Never a smoker.     Current Outpatient Medications on File Prior to Visit   Medication Sig Dispense Refill    aspirin 81 MG EC tablet Take 1 tablet by mouth Daily.      metoprolol succinate XL (TOPROL-XL) 50 MG 24 hr tablet Take 1 tablet by mouth Every Night. 30 tablet 5    Multiple Vitamins-Minerals (OCUVITE ADULT FORMULA PO) Take 1 capsule by mouth Daily.      atorvastatin (LIPITOR) 10 MG tablet Take 1 tablet by mouth Daily.      [DISCONTINUED] atorvastatin (LIPITOR) 40 MG tablet Take 1 tablet by mouth Every Night. 30 tablet 11     No current facility-administered medications on file prior to visit.       Current outpatient and discharge medications have been reconciled for the patient.  Reviewed by: Breana Harden MD        The following portions of the patient's history were reviewed and updated as appropriate: allergies, current medications, past family history, past medical history, past social history, past surgical history, and problem list.    Review of Systems   Constitutional:  Negative for fatigue and unexpected weight change.   Eyes:  Negative for visual disturbance.   Respiratory:  Negative for cough, shortness of breath and wheezing.    Cardiovascular:  Negative for chest pain, palpitations and leg swelling.        No GAVIN, orthopnea, or claudication.   Gastrointestinal:  Negative for abdominal pain, blood in stool, constipation, diarrhea, nausea and vomiting.        Denies melena.   Endocrine: Negative for polydipsia and polyuria.   Genitourinary:  Negative for difficulty urinating, dysuria, frequency, hematuria and urgency.   Musculoskeletal:  Positive for arthralgias (resolving). Negative for myalgias.   Neurological:  Negative for dizziness, syncope, light-headedness and headaches.        No memory issues.   Psychiatric/Behavioral:  Negative for decreased concentration.        Objective       Blood  "pressure 146/82, pulse 68, temperature 97.8 °F (36.6 °C), temperature source Infrared, resp. rate 16, height 175.3 cm (69\"), weight 76.9 kg (169 lb 8 oz).  Body mass index is 25.03 kg/m².      Physical Exam  Vitals and nursing note reviewed.   Constitutional:       Appearance: He is well-developed and normal weight.   Neck:      Thyroid: No thyroid mass or thyromegaly.      Vascular: No carotid bruit.   Cardiovascular:      Rate and Rhythm: Normal rate and regular rhythm.      Pulses: Normal pulses.      Heart sounds: Normal heart sounds. No murmur heard.    No friction rub. No gallop.   Pulmonary:      Effort: Pulmonary effort is normal.      Breath sounds: Normal breath sounds.   Abdominal:      General: Bowel sounds are normal. There is no distension or abdominal bruit.      Palpations: Abdomen is soft. There is no hepatomegaly, splenomegaly or mass.      Tenderness: There is no abdominal tenderness.   Musculoskeletal:      Cervical back: Normal range of motion and neck supple.      Right lower leg: No edema.      Left lower leg: No edema.   Neurological:      Mental Status: He is alert.   Psychiatric:         Mood and Affect: Mood normal.       Assessment / Plan:  Diagnoses and all orders for this visit:    1. Medicare annual wellness visit, initial (Primary)    2. Primary hypertension  -     Lipid Panel  -     Comprehensive Metabolic Panel  -     TSH  -     CBC & Differential   Continue current medication(s) as noted in the history of present illness.    3. Elevated coronary artery calcium score  -     Lipid Panel  -     Comprehensive Metabolic Panel  -     TSH  -     CBC & Differential   Continue current medication(s) as noted in the history of present illness.    4. Prostate cancer   Follow up per Urology.    5. Obstructive sleep apnea syndrome   Continue CPAP.    6. Primary osteoarthritis, unspecified site   Stable, no medication.    7. Melanoma in situ of torso excluding breast   Follow up per " Dermatology.    8. Basal cell carcinoma (BCC) of skin of nose   Follow up per Dermatology.    9. SCC (squamous cell carcinoma), face   Follow up per Dermatology.    I recommended Shingrix (new Shingles vaccine) and Hepatitis A vaccination at the pharmacy.    Recommended COVID-19 vaccine, at the pharmacy or in our office. The patient declined.  The patient was informed he could be hospitalized and die from COVID-19 infection.        BMI is >= 25 and <30. (Overweight) The following options were offered after discussion;: exercise counseling/recommendations and nutrition counseling/recommendations          Return in about 6 months (around 12/14/2023) for Recheck HTN, fasting.

## 2023-06-14 NOTE — PROGRESS NOTES
"The ABCs of the Annual Wellness Visit  Subsequent Medicare Wellness Visit    Subjective    {Wrapup  Review (Popup)  Advance Care Planning  Labs  CC  Problem List  Visit Diagnosis  Medications  Result Review  Imaging  [facility]  [facility]  [facility]  SnapShot  Encounters  Notes  Media  Procedures :23}  James Gary Meese is a 72 y.o. male who presents for a Subsequent Medicare Wellness Visit.    The following portions of the patient's history were reviewed and   updated as appropriate: {history reviewed:20406::\"allergies\",\"current medications\",\"past family history\",\"past medical history\",\"past social history\",\"past surgical history\",\"problem list\"}.    Compared to one year ago, the patient feels his physical   health is {better worse same:61666}.    Compared to one year ago, the patient feels his mental   health is {better worse same:06539}.    Recent Hospitalizations:  {Hospital Admission Status in the last 365 days:81912}      Current Medical Providers:  Patient Care Team:  Breana Harden MD as PCP - General  Breana Harden MD as PCP - Family Medicine  Mushtaq Rudolph MD as Consulting Physician (Urology)    Outpatient Medications Prior to Visit   Medication Sig Dispense Refill   • aspirin 81 MG EC tablet Take 1 tablet by mouth Daily.     • metoprolol succinate XL (TOPROL-XL) 50 MG 24 hr tablet Take 1 tablet by mouth Every Night. 30 tablet 5   • Multiple Vitamins-Minerals (OCUVITE ADULT FORMULA PO) Take 1 capsule by mouth Daily.     • atorvastatin (LIPITOR) 40 MG tablet Take 1 tablet by mouth Every Night. 30 tablet 11     No facility-administered medications prior to visit.       No opioid medication identified on active medication list. I have reviewed chart for other potential  high risk medication/s and harmful drug interactions in the elderly.          Aspirin is on active medication list. {ASPIRIN ON MEDICATION LIST INDICATED/NOT INDICATED:89834}.      Patient Active " "Problem List   Diagnosis   • Simple renal cyst   • Hypertension   • Obstructive sleep apnea syndrome   • Osteoarthritis   • Basal cell carcinoma (BCC) of skin of nose   • SCC (squamous cell carcinoma), face   • Melanoma in situ of torso excluding breast   • Prostate cancer   • Elevated coronary artery calcium score     Advance Care Planning   Advance Care Planning     Advance Directive is on file.  {ACP Discussion, Advance Directive in EMR:20480}     Objective    Vitals:    23 0759   BP: 146/82   BP Location: Right arm   Patient Position: Sitting   Cuff Size: Adult   Pulse: 68   Resp: 16   Temp: 97.8 °F (36.6 °C)   TempSrc: Infrared   Weight: 76.9 kg (169 lb 8 oz)   Height: 175.3 cm (69\")   PainSc: 0-No pain     Estimated body mass index is 25.03 kg/m² as calculated from the following:    Height as of this encounter: 175.3 cm (69\").    Weight as of this encounter: 76.9 kg (169 lb 8 oz).    {BMI is >= 25 and <30. (Overweight) The following options were offered after discussion;:[id number]}    Finger Rub Hearing{Test (right ear):passed  Finger Rub Hearing{Test (left ear):passed        Does the patient have evidence of cognitive impairment?   {Yes/No:53520}            HEALTH RISK ASSESSMENT    Smoking Status:  Social History     Tobacco Use   Smoking Status Never   Smokeless Tobacco Never     Alcohol Consumption:  Social History     Substance and Sexual Activity   Alcohol Use Not Currently    Comment: Weekly     Fall Risk Screen:    SEBASTIANADI Fall Risk Assessment was completed, and patient is at LOW risk for falls.Assessment completed on:2023    Depression Screenin/14/2023     7:57 AM   PHQ-2/PHQ-9 Depression Screening   Little Interest or Pleasure in Doing Things 0-->not at all   Feeling Down, Depressed or Hopeless 0-->not at all   Trouble Falling or Staying Asleep, or Sleeping Too Much 0-->not at all   Feeling Tired or Having Little Energy 0-->not at all   Poor Appetite or Overeating 0-->not at all "   Feeling Bad about Yourself - or that You are a Failure or Have Let Yourself or Your Family Down 0-->not at all   Trouble Concentrating on Things, Such as Reading the Newspaper or Watching Television 0-->not at all   Moving or Speaking So Slowly that Other People Could Have Noticed? Or the Opposite - Being So Fidgety 0-->not at all   Thoughts that You Would be Better Off Dead or of Hurting Yourself in Some Way 0-->not at all   PHQ-9: Brief Depression Severity Measure Score 0       Health Habits and Functional and Cognitive Screenin/14/2023     7:57 AM   Functional & Cognitive Status   Do you have difficulty preparing food and eating? No   Do you have difficulty bathing yourself, getting dressed or grooming yourself? No   Do you have difficulty using the toilet? No   Do you have difficulty moving around from place to place? No   Do you have trouble with steps or getting out of a bed or a chair? No   Current Diet Well Balanced Diet   Dental Exam Up to date   Eye Exam Up to date   Exercise (times per week) 7 times per week   Current Exercises Include Walking;Running/Jogging   Do you need help using the phone?  No   Are you deaf or do you have serious difficulty hearing?  No   Do you need help with transportation? No   Do you need help shopping? No   Do you need help preparing meals?  No   Do you need help with housework?  No   Do you need help with laundry? No   Do you need help taking your medications? No   Do you need help managing money? No   Do you ever drive or ride in a car without wearing a seat belt? No   Have you felt unusual stress, anger or loneliness in the last month? No   Who do you live with? Spouse   If you need help, do you have trouble finding someone available to you? No   Have you been bothered in the last four weeks by sexual problems? No   Do you have difficulty concentrating, remembering or making decisions? No       Age-appropriate Screening Schedule:  Refer to the list below for  future screening recommendations based on patient's age, sex and/or medical conditions. Orders for these recommended tests are listed in the plan section. The patient has been provided with a written plan.    Health Maintenance   Topic Date Due   • ANNUAL WELLNESS VISIT  06/07/2023   • COVID-19 Vaccine (3 - Pfizer series) 06/16/2023 (Originally 6/15/2021)   • ZOSTER VACCINE (1 of 2) 08/31/2023 (Originally 11/22/2002)   • INFLUENZA VACCINE  10/01/2023   • LIPID PANEL  12/07/2023   • PROSTATE CANCER SCREENING  02/14/2024   • COLORECTAL CANCER SCREENING  10/31/2025   • TDAP/TD VACCINES (3 - Td or Tdap) 03/14/2027   • HEPATITIS C SCREENING  Completed   • Pneumococcal Vaccine 65+  Completed                  CMS Preventative Services Quick Reference  Risk Factors Identified During Encounter:    {Medicare Wellness Risk Factors:57920}    The above risks/problems have been discussed with the patient.  Pertinent information has been shared with the patient in the After Visit Summary.    There are no diagnoses linked to this encounter.    Follow Up:   Next Medicare Wellness visit to be scheduled in 1 year.      An After Visit Summary and PPPS were made available to the patient.

## 2023-06-14 NOTE — PATIENT INSTRUCTIONS
I recommend Shingrix (new Shingles vaccine) and Hepatitis A vaccination at the pharmacy.    Health Maintenance, Male  Adopting a healthy lifestyle and getting preventive care are important in promoting health and wellness. Ask your health care provider about:  The right schedule for you to have regular tests and exams.  Things you can do on your own to prevent diseases and keep yourself healthy.  What should I know about diet, weight, and exercise?  Eat a healthy diet    Eat a diet that includes plenty of vegetables, fruits, low-fat dairy products, and lean protein.  Do not eat a lot of foods that are high in solid fats, added sugars, or sodium.  Maintain a healthy weight  Body mass index (BMI) is a measurement that can be used to identify possible weight problems. It estimates body fat based on height and weight. Your health care provider can help determine your BMI and help you achieve or maintain a healthy weight.  Get regular exercise  Get regular exercise. This is one of the most important things you can do for your health. Most adults should:  Exercise for at least 150 minutes each week. The exercise should increase your heart rate and make you sweat (moderate-intensity exercise).  Do strengthening exercises at least twice a week. This is in addition to the moderate-intensity exercise.  Spend less time sitting. Even light physical activity can be beneficial.  Watch cholesterol and blood lipids  Have your blood tested for lipids and cholesterol at 20 years of age, then have this test every 5 years.  You may need to have your cholesterol levels checked more often if:  Your lipid or cholesterol levels are high.  You are older than 40 years of age.  You are at high risk for heart disease.  What should I know about cancer screening?  Many types of cancers can be detected early and may often be prevented. Depending on your health history and family history, you may need to have cancer screening at various ages. This  may include screening for:  Colorectal cancer.  Prostate cancer.  Skin cancer.  Lung cancer.  What should I know about heart disease, diabetes, and high blood pressure?  Blood pressure and heart disease  High blood pressure causes heart disease and increases the risk of stroke. This is more likely to develop in people who have high blood pressure readings or are overweight.  Talk with your health care provider about your target blood pressure readings.  Have your blood pressure checked:  Every 3-5 years if you are 18-39 years of age.  Every year if you are 40 years old or older.  If you are between the ages of 65 and 75 and are a current or former smoker, ask your health care provider if you should have a one-time screening for abdominal aortic aneurysm (AAA).  Diabetes  Have regular diabetes screenings. This checks your fasting blood sugar level. Have the screening done:  Once every three years after age 45 if you are at a normal weight and have a low risk for diabetes.  More often and at a younger age if you are overweight or have a high risk for diabetes.  What should I know about preventing infection?  Hepatitis B  If you have a higher risk for hepatitis B, you should be screened for this virus. Talk with your health care provider to find out if you are at risk for hepatitis B infection.  Hepatitis C  Blood testing is recommended for:  Everyone born from 1945 through 1965.  Anyone with known risk factors for hepatitis C.  Sexually transmitted infections (STIs)  You should be screened each year for STIs, including gonorrhea and chlamydia, if:  You are sexually active and are younger than 24 years of age.  You are older than 24 years of age and your health care provider tells you that you are at risk for this type of infection.  Your sexual activity has changed since you were last screened, and you are at increased risk for chlamydia or gonorrhea. Ask your health care provider if you are at risk.  Ask your health  care provider about whether you are at high risk for HIV. Your health care provider may recommend a prescription medicine to help prevent HIV infection. If you choose to take medicine to prevent HIV, you should first get tested for HIV. You should then be tested every 3 months for as long as you are taking the medicine.  Follow these instructions at home:  Alcohol use  Do not drink alcohol if your health care provider tells you not to drink.  If you drink alcohol:  Limit how much you have to 0-2 drinks a day.  Know how much alcohol is in your drink. In the [place] one drink equals one 12 oz bottle of beer (355 mL), one 5 oz glass of wine (148 mL), or one 1½ oz glass of hard liquor (44 mL).  Lifestyle  Do not use any products that contain nicotine or tobacco. These products include cigarettes, chewing tobacco, and vaping devices, such as e-cigarettes. If you need help quitting, ask your health care provider.  Do not use street drugs.  Do not share needles.  Ask your health care provider for help if you need support or information about quitting drugs.  General instructions  Schedule regular health, dental, and eye exams.  Stay current with your vaccines.  Tell your health care provider if:  You often feel depressed.  You have ever been abused or do not feel safe at home.  Summary  Adopting a healthy lifestyle and getting preventive care are important in promoting health and wellness.  Follow your health care provider's instructions about healthy diet, exercising, and getting tested or screened for diseases.  Follow your health care provider's instructions on monitoring your cholesterol and blood pressure.  This information is not intended to replace advice given to you by your health care provider. Make sure you discuss any questions you have with your health care provider.  Document Revised: 05/09/2022 Document Reviewed: 05/09/2022  [employer] Patient Education © 2022 [employer]  MyPlate from [employer]  MyPlate is an  outline of a general healthy diet based on the Dietary Guidelines for Americans, 3825-8279, from the U.[place]. Department of Agriculture (USDA). It sets guidelines for how much food you should eat from each food group based on your age, sex, and level of physical activity.  What are tips for following MyPlate?  To follow MyPlate recommendations:  Eat a wide variety of fruits and vegetables, grains, and protein foods.  Serve smaller portions and eat less food throughout the day.  Limit portion sizes to avoid overeating.  Enjoy your food.  Get at least 150 minutes of exercise every week. This is about 30 minutes each day, 5 or more days per week.  It can be difficult to have every meal look like MyPlate. Think about MyPlate as eating guidelines for an entire day, rather than each individual meal.  Fruits and vegetables  Make one half of your plate fruits and vegetables.  Eat many different colors of fruits and vegetables each day.  For a 2,000-calorie daily food plan, eat:  2½ cups of vegetables every day.  2 cups of fruit every day.  1 cup is equal to:  1 cup raw or cooked vegetables.  1 cup raw fruit.  1 medium-sized orange, apple, or banana.  1 cup 100% fruit or vegetable juice.  2 cups raw leafy greens, such as lettuce, spinach, or kale.  ½ cup dried fruit.  Grains  One fourth of your plate should be grains.  Make at least half of the grains you eat each day whole grains.  For a 2,000-calorie daily food plan, eat 6 oz of grains every day.  1 oz is equal to:  1 slice bread.  1 cup cereal.  ½ cup cooked rice, cereal, or pasta.  Protein  One fourth of your plate should be protein.  Eat a wide variety of protein foods, including meat, poultry, fish, eggs, beans, nuts, and tofu.  For a 2,000-calorie daily food plan, eat 5½ oz of protein every day.  1 oz is equal to:  1 oz meat, poultry, or fish.  ¼ cup cooked beans.  1 egg.  ½ oz nuts or seeds.  1 Tbsp peanut butter.  Dairy  Drink fat-free or low-fat (1%) milk.  Eat or  drink dairy as a side to meals.  For a 2,000-calorie daily food plan, eat or drink 3 cups of dairy every day.  1 cup is equal to:  1 cup milk, yogurt, cottage cheese, or soy milk (soy beverage).  2 oz processed cheese.  1½ oz natural cheese.  Fats, oils, salt, and sugars  Only small amounts of oils are recommended.  Avoid foods that are high in calories and low in nutritional value (empty calories), like foods high in fat or added sugars.  Choose foods that are low in salt (sodium). Choose foods that have less than 140 milligrams (mg) of sodium per serving.  Drink water instead of sugary drinks. Drink enough fluid to keep your urine pale yellow.  Where to find support  Work with your health care provider or a dietitian to develop a customized eating plan that is right for you.  Download an [employer]te (mobile application) to help you track your daily food intake.  Where to find more information  USDA: ChooseMyPlate.gov  Summary  MyPlate is a general guideline for healthy eating from the USDA. It is based on the Dietary Guidelines for Americans, 6181-1152.  In general, fruits and vegetables should take up one half of your plate, grains should take up one fourth of your plate, and protein should take up one fourth of your plate.  This information is not intended to replace advice given to you by your health care provider. Make sure you discuss any questions you have with your health care provider.  Document Revised: 11/08/2021 Document Reviewed: 11/08/2021  [employer] Patient Education © 2022 [employer]  Exercising to Stay Healthy  To become healthy and stay healthy, it is recommended that you do moderate-intensity and vigorous-intensity exercise. You can tell that you are exercising at a moderate intensity if your heart starts beating faster and you start breathing faster but can still hold a conversation. You can tell that you are exercising at a vigorous intensity if you are breathing much harder and faster and cannot  hold a conversation while exercising.  How can exercise benefit me?  Exercising regularly is important. It has many health benefits, such as:  Improving overall fitness, flexibility, and endurance.  Increasing bone density.  Helping with weight control.  Decreasing body fat.  Increasing muscle strength and endurance.  Reducing stress and tension, anxiety, depression, or anger.  Improving overall health.  What guidelines should I follow while exercising?  Before you start a new exercise program, talk with your health care provider.  Do not exercise so much that you hurt yourself, feel dizzy, or get very short of breath.  Wear comfortable clothes and wear shoes with good support.  Drink plenty of water while you exercise to prevent dehydration or heat stroke.  Work out until your breathing and your heartbeat get faster (moderate intensity).  How often should I exercise?  Choose an activity that you enjoy, and set realistic goals. Your health care provider can help you make an activity plan that is individually designed and works best for you.  Exercise regularly as told by your health care provider. This may include:  Doing strength training two times a week, such as:  Lifting weights.  Using resistance bands.  Push-ups.  Sit-ups.  Yoga.  Doing a certain intensity of exercise for a given amount of time. Choose from these options:  A total of 150 minutes of moderate-intensity exercise every week.  A total of 75 minutes of vigorous-intensity exercise every week.  A mix of moderate-intensity and vigorous-intensity exercise every week.  Children, pregnant women, people who have not exercised regularly, people who are overweight, and older adults may need to talk with a health care provider about what activities are safe to perform. If you have a medical condition, be sure to talk with your health care provider before you start a new exercise program.  What are some exercise ideas?  Moderate-intensity exercise ideas  include:  Walking 1 mile (1.6 km) in about 15 minutes.  Biking.  Hiking.  Golfing.  Dancing.  Water aerobics.  Vigorous-intensity exercise ideas include:  Walking 4.5 miles (7.2 km) or more in about 1 hour.  Jogging or running 5 miles (8 km) in about 1 hour.  Biking 10 miles (16.1 km) or more in about 1 hour.  Lap swimming.  Roller-skating or in-line skating.  Cross-country skiing.  Vigorous competitive sports, such as football, basketball, and soccer.  Jumping rope.  Aerobic dancing.  What are some everyday activities that can help me get exercise?  Yard work, such as:  Pushing a .  Raking and bagging leaves.  Washing your car.  Pushing a stroller.  Shoveling snow.  Gardening.  Washing windows or floors.  How can I be more active in my day-to-day activities?  Use stairs instead of an elevator.  Take a walk during your lunch break.  If you drive, park your car farther away from your work or school.  If you take public transportation, get off one stop early and walk the rest of the way.  Stand up or walk around during all of your indoor phone calls.  Get up, stretch, and walk around every 30 minutes throughout the day.  Enjoy exercise with a friend. Support to continue exercising will help you keep a regular routine of activity.  Where to find more information  You can find more information about exercising to stay healthy from:  [place].S. Department of Health and Human Services: www.hhs.gov  Centers for Disease Control and Prevention (CDC): www.cdc.gov  Summary  Exercising regularly is important. It will improve your overall fitness, flexibility, and endurance.  Regular exercise will also improve your overall health. It can help you control your weight, reduce stress, and improve your bone density.  Do not exercise so much that you hurt yourself, feel dizzy, or get very short of breath.  Before you start a new exercise program, talk with your health care provider.  This information is not intended to replace  advice given to you by your health care provider. Make sure you discuss any questions you have with your health care provider.  Document Revised: 04/15/2022 Document Reviewed: 04/15/2022  [employer] Patient Education © 2022 [employer] Inc.

## 2023-08-14 ENCOUNTER — LAB (OUTPATIENT)
Dept: LAB | Facility: HOSPITAL | Age: 73
End: 2023-08-14
Payer: MEDICARE

## 2023-08-14 DIAGNOSIS — C61 PROSTATE CANCER: ICD-10-CM

## 2023-08-14 LAB — PSA SERPL-MCNC: <0.014 NG/ML (ref 0–4)

## 2023-08-14 PROCEDURE — 36415 COLL VENOUS BLD VENIPUNCTURE: CPT

## 2023-08-14 PROCEDURE — 84153 ASSAY OF PSA TOTAL: CPT

## 2023-08-17 ENCOUNTER — OFFICE VISIT (OUTPATIENT)
Dept: UROLOGY | Facility: CLINIC | Age: 73
End: 2023-08-17
Payer: MEDICARE

## 2023-08-17 VITALS
SYSTOLIC BLOOD PRESSURE: 128 MMHG | WEIGHT: 168 LBS | OXYGEN SATURATION: 96 % | BODY MASS INDEX: 24.88 KG/M2 | DIASTOLIC BLOOD PRESSURE: 74 MMHG | HEART RATE: 61 BPM | HEIGHT: 69 IN

## 2023-08-17 DIAGNOSIS — N39.3 SUI (STRESS URINARY INCONTINENCE), MALE: ICD-10-CM

## 2023-08-17 DIAGNOSIS — C61 PROSTATE CANCER: Primary | ICD-10-CM

## 2023-08-17 LAB
BILIRUB BLD-MCNC: NEGATIVE MG/DL
CLARITY, POC: CLEAR
COLOR UR: YELLOW
EXPIRATION DATE: ABNORMAL
GLUCOSE UR STRIP-MCNC: NEGATIVE MG/DL
KETONES UR QL: NEGATIVE
LEUKOCYTE EST, POC: NEGATIVE
Lab: ABNORMAL
NITRITE UR-MCNC: NEGATIVE MG/ML
PH UR: 6 [PH] (ref 5–8)
PROT UR STRIP-MCNC: NEGATIVE MG/DL
RBC # UR STRIP: ABNORMAL /UL
SP GR UR: 1.02 (ref 1–1.03)
UROBILINOGEN UR QL: NORMAL

## 2023-08-17 NOTE — PROGRESS NOTES
Follow Up Office Visit      Patient Name: James Gary Meese  : 1950   MRN: [id number]     Chief Complaint:    Chief Complaint   Patient presents with    Prostate Cancer    ACE       Referring Provider: No ref. provider found    History of Present Illness: James Gary Meese is a 72 y.o. male who presents today for follow up of prostate cancer. Mr. Meese underwent robotic prostatectomy 2022, surgical pathology reveals Lopez 4+3 equal 7, grade group 3 prostate cancer with focal extraprostatic extension, surgical staging pT3a, NX M0.     He returns today for 1 year follow up.   His PSA remains undetectable.     Lab Results   Component Value Date    PSA <0.014 2023    PSA <0.014 2023    PSA <0.014 2022    PSA 6.790 (H) 05/10/2022    PSA 3.200 2017     He has mild to moderate ongoing ACE.   Using 1-3 pads per day, no diapers.   Has completed pelvic floor PT with improvement.   No erections but not sexually active.     Subjective      Review of System: Review of Systems   Genitourinary:  Positive for enuresis. Negative for decreased urine volume, difficulty urinating, dysuria, flank pain, frequency, hematuria and urgency.    I have reviewed the ROS documented by my clinical staff, I have updated appropriately and I agree. Mushtaq Rudolph MD    I have reviewed and the following portions of the patient's history were updated as appropriate: past family history, past medical history, past social history, past surgical history and problem list.    Medications:     Current Outpatient Medications:     aspirin 81 MG EC tablet, Take 1 tablet by mouth Daily., Disp: , Rfl:     atorvastatin (LIPITOR) 10 MG tablet, Take 1 tablet by mouth Daily., Disp: , Rfl:     metoprolol succinate XL (TOPROL-XL) 50 MG 24 hr tablet, Take 1 tablet by mouth Every Night., Disp: 30 tablet, Rfl: 5    Multiple Vitamins-Minerals (OCUVITE ADULT FORMULA PO), Take 1 capsule by mouth Daily., Disp: , Rfl:      Allergies:   Allergies   Allergen Reactions    Hydrochlorothiazide Other (See Comments)     Increased incontinence        IPSS Questionnaire (AUA-7):  Over the past month.    1)  Incomplete Emptying:       How often have you had a sensation of not emptying you had the sensation of not emptying your bladder completely after you finished urinating?  0 - Not at all   2)  Frequency:       How often have you had the urinate again less than two hours after you finished urinating?  5 - Almost always   3)  Intermittency:       How often have you found you stopped and started again several times when you urinated?   5 - Almost always   4) Urgency:      How often have you found it difficult to postpone urination?  5 - Almost always   5) Weak Stream:      How often have you had a weak urinary stream?  0 - Not at all   6) Straining:       How often have you had to push or strain to begin urination?  0 - Not at all   7) Nocturia:      How many times did you most typically get up to urinate from the time you went to bed at night until the time you got up in the morning?  1 - 1 time   Total Score:  16   The International Prostate Symptom Score (IPSS) is used to screen, diagnose, track symptoms of benign prostatic hyperplasia (BPH).   0-7 (Mild Symptoms) 8-19 (Moderate) 20-35 (Severe)   Quality of Life (QoL):  If you were to spend the rest of your life with your urinary condition just the way it is now, how would you feel about that? 2-Mostly Satisfied   Urine Leakage (Incontinence) 0-No Leakage     Sexual Health Inventory for Men (ISA)   Over the past 6 months:     1. How do you rate your confidence that you could get and keep an erection?  0 - No sexual activity    2. When you had erections with sexual  stimulation, how often were your erections hard enough for penetration (entering your partner)?  0 - No Sexual Activity    3. During sexual intercourse, how often were you able to maintain your erection after you had  "penetrated (entered) your partner?  0 - Did not attempt intercourse   4. During sexual intercourse, how difficult was it to maintain your erection to completion of intercourse?  0 - Did not attempt intercourse   5. When you attempted sexual intercourse, how often was it satisfactory for you?  0 - Did not attempt intercourse    Total Score: 0   The Sexual Health Inventory for Men further classifies ED severity with the following breakpoints:   1-7 (Severe ED) 8-11 (Moderate ED) 12-16 (Mild to Moderate ED) 17-21 (Mild ED)      Post void residual bladder scan:   0 mL     Objective     Physical Exam:   Vital Signs:   Vitals:    08/17/23 0757   BP: 128/74   Pulse: 61   SpO2: 96%   Weight: 76.2 kg (168 lb)   Height: 175.3 cm (69\")     Body mass index is 24.81 kg/mý.     Physical Exam  Vitals and nursing note reviewed.   Constitutional:       Appearance: Normal appearance.   HENT:      Head: Normocephalic and atraumatic.      Mouth/Throat:      Mouth: Mucous membranes are moist.      Pharynx: Oropharynx is clear.   Eyes:      Extraocular Movements: Extraocular movements intact.      Conjunctiva/sclera: Conjunctivae normal.   Cardiovascular:      Rate and Rhythm: Normal rate and regular rhythm.   Pulmonary:      Effort: Pulmonary effort is normal. No respiratory distress.   Abdominal:      Palpations: Abdomen is soft.      Tenderness: There is no abdominal tenderness. There is no right CVA tenderness or left CVA tenderness.      Comments: Robotic port site incisions well approximated with scar.  No hernia.   Genitourinary:     Comments: Circumcised phallus, orthotopic meatus, bilaterally descended testicles without masses, or lesions.        Musculoskeletal:         General: Normal range of motion.      Cervical back: Normal range of motion.   Skin:     General: Skin is warm and dry.   Neurological:      General: No focal deficit present.      Mental Status: He is alert and oriented to person, place, and time. "   Psychiatric:         Mood and Affect: Mood normal.         Behavior: Behavior normal.       Labs:   Brief Urine Lab Results  (Last result in the past 365 days)        Color   Clarity   Blood   Leuk Est   Nitrite   Protein   CREAT   Urine HCG        08/17/23 0818 Yellow   Clear   Trace   Negative   Negative   Negative                   Urine Culture          8/27/2022    02:23   Urine Culture   Urine Culture No growth         Lab Results   Component Value Date    GLUCOSE 87 06/14/2023    CALCIUM 9.4 06/14/2023     06/14/2023    K 4.4 06/14/2023    CO2 25.0 06/14/2023     06/14/2023    BUN 19 06/14/2023    CREATININE 1.06 06/14/2023    EGFRIFNONA 75 03/14/2017    BCR 17.9 06/14/2023    ANIONGAP 11.0 06/14/2023       Lab Results   Component Value Date    WBC 5.95 06/14/2023    HGB 16.2 06/14/2023    HCT 47.6 06/14/2023    MCV 91.5 06/14/2023     06/14/2023       Images:   No Images in the past 120 days found..    Measures:   Tobacco:   James Gary Meese  reports that he has never smoked. He has never used smokeless tobacco.     Assessment / Plan      Assessment/Plan:   72 y.o. male who presented today for follow up of prostate cancer s/p robotic prostatectomy.  Patient is considered tentatively cured.  PSA remains undetectable 1 year out.    F/u in 1 year with PSA prior  Has some mild persistent ACE requiring the use of 1 pad.  He will continue Kegels.    He was offered penile Cunningham clamp, he would like to defer.  Discussed artificial urethral sphincter he would like to defer.     Diagnoses and all orders for this visit:    1. Prostate cancer (Primary)  -     POC Urinalysis Dipstick, Automated  -     PSA Diagnostic; Future    2. ACE (stress urinary incontinence), male  -     POC Urinalysis Dipstick, Automated           Follow Up:   Return in about 1 year (around 8/17/2024).    I spent approximately 20 minutes providing clinical care for this patient; including review of patient's chart and  provider documentation, face to face time spent with patient in examination room (obtaining history, performing physical exam, discussing diagnosis and management options), placing orders, and completing patient documentation.     Mushtaq Rudolph MD  [facility] [place]

## 2023-10-02 RX ORDER — ATORVASTATIN CALCIUM 10 MG/1
TABLET, FILM COATED ORAL
Qty: 30 TABLET | Refills: 0 | Status: SHIPPED | OUTPATIENT
Start: 2023-10-02

## 2023-11-21 RX ORDER — ATORVASTATIN CALCIUM 10 MG/1
10 TABLET, FILM COATED ORAL NIGHTLY
Qty: 30 TABLET | Refills: 0 | Status: SHIPPED | OUTPATIENT
Start: 2023-11-21

## 2023-11-21 RX ORDER — METOPROLOL SUCCINATE 50 MG/1
50 TABLET, EXTENDED RELEASE ORAL NIGHTLY
Qty: 30 TABLET | Refills: 0 | Status: SHIPPED | OUTPATIENT
Start: 2023-11-21

## 2023-11-21 NOTE — TELEPHONE ENCOUNTER
Caller: Meese, James Gary    Relationship: Self    Best call back number: [phone]     Requested Prescriptions:   Requested Prescriptions     Pending Prescriptions Disp Refills    metoprolol succinate XL (TOPROL-XL) 50 MG 24 hr tablet 30 tablet 5     Sig: Take 1 tablet by mouth Every Night.    atorvastatin (LIPITOR) 10 MG tablet 30 tablet 0     Sig: Take 1 tablet by mouth Every Night.        Pharmacy where request should be sent: [address] [phone] [facility] [phone]      Last office visit with prescribing clinician: 6/14/2023   Last telemedicine visit with prescribing clinician: Visit date not found   Next office visit with prescribing clinician: 12/13/2023     Additional details provided by patient: PATIENT HAS 2 DAYS LEFT.     Does the patient have less than a 3 day supply:  [x] Yes  [] No    Would you like a call back once the refill request has been completed: [] Yes [x] No    If the office needs to give you a call back, can they leave a voicemail: [] Yes [x] No    Cadance Dunaway, RegSched Rep   11/21/23 09:37 EST

## 2023-12-13 ENCOUNTER — OFFICE VISIT (OUTPATIENT)
Dept: INTERNAL MEDICINE | Facility: CLINIC | Age: 73
End: 2023-12-13
Payer: MEDICARE

## 2023-12-13 VITALS
SYSTOLIC BLOOD PRESSURE: 128 MMHG | TEMPERATURE: 97.8 F | DIASTOLIC BLOOD PRESSURE: 72 MMHG | WEIGHT: 165.38 LBS | BODY MASS INDEX: 24.42 KG/M2 | RESPIRATION RATE: 16 BRPM | HEART RATE: 66 BPM

## 2023-12-13 DIAGNOSIS — C61 PROSTATE CANCER: ICD-10-CM

## 2023-12-13 DIAGNOSIS — M19.91 PRIMARY OSTEOARTHRITIS, UNSPECIFIED SITE: ICD-10-CM

## 2023-12-13 DIAGNOSIS — G47.33 OBSTRUCTIVE SLEEP APNEA SYNDROME: ICD-10-CM

## 2023-12-13 DIAGNOSIS — R93.1 ELEVATED CORONARY ARTERY CALCIUM SCORE: ICD-10-CM

## 2023-12-13 DIAGNOSIS — I10 PRIMARY HYPERTENSION: Primary | ICD-10-CM

## 2023-12-13 LAB
ALBUMIN SERPL-MCNC: 4.7 G/DL (ref 3.5–5.2)
ALBUMIN/GLOB SERPL: 2.5 G/DL
ALP SERPL-CCNC: 82 U/L (ref 39–117)
ALT SERPL W P-5'-P-CCNC: 34 U/L (ref 1–41)
ANION GAP SERPL CALCULATED.3IONS-SCNC: 10 MMOL/L (ref 5–15)
AST SERPL-CCNC: 29 U/L (ref 1–40)
BASOPHILS # BLD AUTO: 0.03 10*3/MM3 (ref 0–0.2)
BASOPHILS NFR BLD AUTO: 0.5 % (ref 0–1.5)
BILIRUB SERPL-MCNC: 1 MG/DL (ref 0–1.2)
BUN SERPL-MCNC: 16 MG/DL (ref 8–23)
BUN/CREAT SERPL: 16.5 (ref 7–25)
CALCIUM SPEC-SCNC: 9.4 MG/DL (ref 8.6–10.5)
CHLORIDE SERPL-SCNC: 105 MMOL/L (ref 98–107)
CHOLEST SERPL-MCNC: 130 MG/DL (ref 0–200)
CO2 SERPL-SCNC: 26 MMOL/L (ref 22–29)
CREAT SERPL-MCNC: 0.97 MG/DL (ref 0.76–1.27)
DEPRECATED RDW RBC AUTO: 40.2 FL (ref 37–54)
EGFRCR SERPLBLD CKD-EPI 2021: 83.5 ML/MIN/1.73
EOSINOPHIL # BLD AUTO: 0.17 10*3/MM3 (ref 0–0.4)
EOSINOPHIL NFR BLD AUTO: 2.9 % (ref 0.3–6.2)
ERYTHROCYTE [DISTWIDTH] IN BLOOD BY AUTOMATED COUNT: 12.2 % (ref 12.3–15.4)
GLOBULIN UR ELPH-MCNC: 1.9 GM/DL
GLUCOSE SERPL-MCNC: 91 MG/DL (ref 65–99)
HCT VFR BLD AUTO: 46.8 % (ref 37.5–51)
HDLC SERPL-MCNC: 35 MG/DL (ref 40–60)
HGB BLD-MCNC: 16 G/DL (ref 13–17.7)
IMM GRANULOCYTES # BLD AUTO: 0.03 10*3/MM3 (ref 0–0.05)
IMM GRANULOCYTES NFR BLD AUTO: 0.5 % (ref 0–0.5)
LDLC SERPL CALC-MCNC: 81 MG/DL (ref 0–100)
LDLC/HDLC SERPL: 2.31 {RATIO}
LYMPHOCYTES # BLD AUTO: 1.22 10*3/MM3 (ref 0.7–3.1)
LYMPHOCYTES NFR BLD AUTO: 20.8 % (ref 19.6–45.3)
MCH RBC QN AUTO: 31.1 PG (ref 26.6–33)
MCHC RBC AUTO-ENTMCNC: 34.2 G/DL (ref 31.5–35.7)
MCV RBC AUTO: 90.9 FL (ref 79–97)
MONOCYTES # BLD AUTO: 0.43 10*3/MM3 (ref 0.1–0.9)
MONOCYTES NFR BLD AUTO: 7.3 % (ref 5–12)
NEUTROPHILS NFR BLD AUTO: 3.98 10*3/MM3 (ref 1.7–7)
NEUTROPHILS NFR BLD AUTO: 68 % (ref 42.7–76)
NRBC BLD AUTO-RTO: 0 /100 WBC (ref 0–0.2)
PLATELET # BLD AUTO: 256 10*3/MM3 (ref 140–450)
PMV BLD AUTO: 9.3 FL (ref 6–12)
POTASSIUM SERPL-SCNC: 4 MMOL/L (ref 3.5–5.2)
PROT SERPL-MCNC: 6.6 G/DL (ref 6–8.5)
RBC # BLD AUTO: 5.15 10*6/MM3 (ref 4.14–5.8)
SODIUM SERPL-SCNC: 141 MMOL/L (ref 136–145)
TRIGL SERPL-MCNC: 71 MG/DL (ref 0–150)
VLDLC SERPL-MCNC: 14 MG/DL (ref 5–40)
WBC NRBC COR # BLD AUTO: 5.86 10*3/MM3 (ref 3.4–10.8)

## 2023-12-13 PROCEDURE — 36415 COLL VENOUS BLD VENIPUNCTURE: CPT | Performed by: INTERNAL MEDICINE

## 2023-12-13 PROCEDURE — 1160F RVW MEDS BY RX/DR IN RCRD: CPT | Performed by: INTERNAL MEDICINE

## 2023-12-13 PROCEDURE — 99214 OFFICE O/P EST MOD 30 MIN: CPT | Performed by: INTERNAL MEDICINE

## 2023-12-13 PROCEDURE — 80061 LIPID PANEL: CPT | Performed by: INTERNAL MEDICINE

## 2023-12-13 PROCEDURE — 80053 COMPREHEN METABOLIC PANEL: CPT | Performed by: INTERNAL MEDICINE

## 2023-12-13 PROCEDURE — 3074F SYST BP LT 130 MM HG: CPT | Performed by: INTERNAL MEDICINE

## 2023-12-13 PROCEDURE — 1159F MED LIST DOCD IN RCRD: CPT | Performed by: INTERNAL MEDICINE

## 2023-12-13 PROCEDURE — 3078F DIAST BP <80 MM HG: CPT | Performed by: INTERNAL MEDICINE

## 2023-12-13 PROCEDURE — 85025 COMPLETE CBC W/AUTO DIFF WBC: CPT | Performed by: INTERNAL MEDICINE

## 2023-12-13 NOTE — PATIENT INSTRUCTIONS
I recommend Shingrix (Shingles vaccine), Hepatitis A vaccine, RSV vaccine, and COVID-19 bivalent vaccine at the pharmacy, as we discussed.  I recommend an Influenza vaccine in our office or at the pharmacy, as we discussed.

## 2023-12-13 NOTE — PROGRESS NOTES
Subjective       James Gary Meese is a 73 y.o. male.     Chief Complaint   Patient presents with    Primary hypertension     6 month follow up       History obtained from the patient.      History of Present Illness     The patient was diagnosed with Prostate Cancer in May 2022.  He last saw Dr. Rudolph on 8/17/2023.  Urinalysis was negative (trace blood).  PSA was < 0.014.  He has had pelvic floor PT due to urinary incontinence, which is stable.  He has nocturia (2 episodes per night).  He denies any dysuria, hematuria, frequency, urgency, and problems with the urinary stream.       He has mild DAGMAR.  He states he has been off CPAP for 6 to 12 months because his machine is very old.     He has mild Osteoarthritis, stable on no medication.     Cardiac Follow-Up: The patient is here today for a follow-up visit.      His Hypertension has been stable   Medication: Toprol-XL  Side Effects: None.     Comorbid Illness: Coronary Artery Calcification, on Aspirin and Atorvastatin.     Procedures: On 4/18/2023, Coronary Artery Calcium Score was 1013.4.  He has not had a cardiac stress test.     Interval Events:   On 6/14/13, LDL was 82 and TG 73.  The patient was started on Aspirin and Atorvastatin after his Cardiac CT Scan on 4/18/2023.  He saw Dr. Finn on 5/1/2023.  His Atorvastatin was increased to 40 mg daily, but the patient states he remained on 10 mg daily.      Symptoms:  Denies chest pain, shortness of breath, GAVIN, orthopnea, PND, lower extremity edema, claudication, lightheadedness, and dizziness.    Associated Symptoms: Weight decreased 4 intentionally pounds in the past 6 months.  Denies fatigue, headache, polyuria, polydipsia, myalgias, arthralgias, visual impairment, memory loss, or concentration issues.     Lifestyle: He consumes a diverse and healthy diet.  He walks 2-3 miles daily.   Tobacco Use: Never a smoker.        Current Outpatient Medications on File Prior to Visit   Medication Sig Dispense Refill     aspirin 81 MG EC tablet Take 1 tablet by mouth Daily.      atorvastatin (LIPITOR) 10 MG tablet Take 1 tablet by mouth Every Night. 30 tablet 0    metoprolol succinate XL (TOPROL-XL) 50 MG 24 hr tablet Take 1 tablet by mouth Every Night. 30 tablet 0    Multiple Vitamins-Minerals (OCUVITE ADULT FORMULA PO) Take 1 capsule by mouth Daily.       No current facility-administered medications on file prior to visit.       Current outpatient and discharge medications have been reconciled for the patient.  Reviewed by: Breana Harden MD        The following portions of the patient's history were reviewed and updated as appropriate: allergies, current medications, past family history, past medical history, past social history, past surgical history, and problem list.    Review of Systems   Constitutional:  Negative for fatigue and unexpected weight change.   Eyes:  Negative for visual disturbance.   Respiratory:  Negative for cough, shortness of breath and wheezing.    Cardiovascular:  Negative for chest pain, palpitations and leg swelling.        No GAVIN, orthopnea, or claudication.   Gastrointestinal:  Negative for abdominal pain, blood in stool, constipation, diarrhea, nausea and vomiting.        Denies melena.   Endocrine: Negative for polydipsia and polyuria.   Musculoskeletal:  Negative for arthralgias and myalgias.   Neurological:  Negative for dizziness, syncope, light-headedness and headaches.        No memory issues.   Psychiatric/Behavioral:  Negative for decreased concentration.          Objective       Blood pressure 128/72, pulse 66, temperature 97.8 °F (36.6 °C), temperature source Infrared, resp. rate 16, weight 75 kg (165 lb 6 oz).  Body mass index is 24.42 kg/m².      Physical Exam  Vitals and nursing note reviewed.   Constitutional:       Appearance: He is well-developed and normal weight.   Neck:      Thyroid: No thyroid mass or thyromegaly.      Vascular: No carotid bruit.   Cardiovascular:      Rate and  Rhythm: Normal rate and regular rhythm.      Pulses: Normal pulses.      Heart sounds: Normal heart sounds. No murmur heard.     No friction rub. No gallop.   Pulmonary:      Effort: Pulmonary effort is normal.      Breath sounds: Normal breath sounds.   Musculoskeletal:      Right lower leg: No edema.      Left lower leg: No edema.   Neurological:      Mental Status: He is alert.   Psychiatric:         Mood and Affect: Mood normal.         Assessment / Plan:  Diagnoses and all orders for this visit:    1. Primary hypertension (Primary)  -     CBC & Differential  -     Lipid Panel  -     Comprehensive Metabolic Panel   Continue current medication(s) as noted in the history of present illness.    2. Elevated coronary artery calcium score  -     CBC & Differential  -     Lipid Panel  -     Comprehensive Metabolic Panel   Continue current medication(s) as noted in the history of present illness.   Follow up per Cardiology.    3. Primary osteoarthritis, unspecified site   Stable, no medication.    4. Prostate cancer   Follow up per Urology.    5. Obstructive sleep apnea syndrome  -     Ambulatory Referral to Sleep Medicine      I recommended Influenza vaccine, at the pharmacy or in our office. The patient declined.  The patient was informed he/she could be hospitalized and die from Influenza infection.    I recommended Shingrix (Shingles vaccine), Hepatitis A vaccine, RSV vaccine, and COVID-19 bivalent vaccine at the pharmacy.      BMI is within normal parameters. No other follow-up for BMI required.          Return in about 6 months (around 6/13/2024) for Schedule subseq Medicare Wellness Exam, and, Recheck HTN, fasting.

## 2023-12-18 RX ORDER — METOPROLOL SUCCINATE 50 MG/1
50 TABLET, EXTENDED RELEASE ORAL NIGHTLY
Qty: 30 TABLET | Refills: 5 | Status: SHIPPED | OUTPATIENT
Start: 2023-12-18

## 2024-01-11 RX ORDER — ATORVASTATIN CALCIUM 10 MG/1
10 TABLET, FILM COATED ORAL NIGHTLY
Qty: 30 TABLET | Refills: 0 | Status: SHIPPED | OUTPATIENT
Start: 2024-01-11

## 2024-01-11 NOTE — TELEPHONE ENCOUNTER
Relationship: Self    Best call back number:      Requested Prescriptions:   Requested Prescriptions     Pending Prescriptions Disp Refills    atorvastatin (LIPITOR) 10 MG tablet 30 tablet 0     Sig: Take 1 tablet by mouth Every Night.        Pharmacy where request should be sent: [address] [phone] [facility] [phone] [facility]     Last office visit with prescribing clinician: 12/13/2023   Last telemedicine visit with prescribing clinician: Visit date not found   Next office visit with prescribing clinician: 6/14/2024     Additional details provided by patient: PATIENT HAS 2 LEFT     Does the patient have less than a 3 day supply:  [x] Yes  [] No    [place] Rep   01/11/24 09:34 EST

## 2024-02-05 RX ORDER — ATORVASTATIN CALCIUM 10 MG/1
10 TABLET, FILM COATED ORAL NIGHTLY
Qty: 30 TABLET | Refills: 0 | Status: SHIPPED | OUTPATIENT
Start: 2024-02-05

## 2024-03-07 RX ORDER — ATORVASTATIN CALCIUM 10 MG/1
10 TABLET, FILM COATED ORAL NIGHTLY
Qty: 30 TABLET | Refills: 0 | Status: SHIPPED | OUTPATIENT
Start: 2024-03-07

## 2024-05-01 ENCOUNTER — PATIENT ROUNDING (BHMG ONLY) (OUTPATIENT)
Dept: CARDIOLOGY | Facility: CLINIC | Age: 74
End: 2024-05-01
Payer: MEDICARE

## 2024-05-01 ENCOUNTER — OFFICE VISIT (OUTPATIENT)
Dept: CARDIOLOGY | Facility: CLINIC | Age: 74
End: 2024-05-01
Payer: MEDICARE

## 2024-05-01 VITALS
BODY MASS INDEX: 24.44 KG/M2 | DIASTOLIC BLOOD PRESSURE: 78 MMHG | WEIGHT: 165 LBS | SYSTOLIC BLOOD PRESSURE: 138 MMHG | HEIGHT: 69 IN | HEART RATE: 60 BPM | OXYGEN SATURATION: 97 %

## 2024-05-01 DIAGNOSIS — I10 PRIMARY HYPERTENSION: ICD-10-CM

## 2024-05-01 DIAGNOSIS — E78.2 MIXED HYPERLIPIDEMIA: ICD-10-CM

## 2024-05-01 DIAGNOSIS — R93.1 ELEVATED CORONARY ARTERY CALCIUM SCORE: Primary | ICD-10-CM

## 2024-05-01 RX ORDER — ATORVASTATIN CALCIUM 10 MG/1
10 TABLET, FILM COATED ORAL NIGHTLY
Qty: 90 TABLET | Refills: 3 | Status: SHIPPED | OUTPATIENT
Start: 2024-05-01

## 2024-05-01 NOTE — PROGRESS NOTES
[facility] CARDIOLOGY    Established Patient Office Visit    Patient Name: James Gary Meese  : 1950   MRN: [id number]   Care Team: Patient Care Team:  Breana Harden MD as PCP - General  Breana Harden MD as PCP - Family Medicine  Mushtaq Rudolph MD as Consulting Physician (Urology)    Chief Complaint   Patient presents with    Elevated coronary artery calcium score     HPI: James Gary Meese is a 73 y.o. male with a history of hypertension, hyperlipidemia, prostate cancer with prostatectomy in Aug 2022 who presents today for routine follow up for elevated coronary artery calcium score.     He has no known history of heart disease but had been put on metoprolol for hypertension as well as some palpitations. He has not had any new dyspnea on exertion or chest pain with activity.  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     He does not have a specific exercise regimen however does walk regularly.  He continues to regularly walk the 4 mile round trip to the [facility] by his house and is able to carry groceries back without limitations. Able to do some jogging as well.     Subjective   Review of Systems   Constitutional:  Negative for activity change.   Respiratory:  Negative for chest tightness and shortness of breath.    Cardiovascular:  Negative for chest pain, palpitations and leg swelling.   Genitourinary:  Positive for urinary incontinence.     Past Medical History:   Diagnosis Date    Basal cell carcinoma (BCC) of skin of nose     Dx - s/p Mohs Surgery    Benign prostatic hyperplasia     Elevated coronary artery calcium score     Dx 23- Coronary Calcium Score of 1013.4.    Elevated PSA     History of varicella     Hypertension     Dx     Melanoma in situ of torso excluding breast     Dx     Obstructive sleep apnea syndrome     Description: dx 9/10-mild, noncompliant with CPAP    Osteoarthritis     Description: mild    Prostate  cancer     Dx 5/2022- [facility]    SCC (squamous cell carcinoma), face     Dx 7/20- right sideburn     Simple renal cyst     Dx 10/7/15 by u/s (bilateral)     Past Surgical History:   Procedure Laterality Date    APPENDECTOMY  1963    INGUINAL HERNIA REPAIR Right 07/2013    with mesh    MOHS SURGERY  01/20/2020    nasal BCC    PROSTATECTOMY N/A 8/26/2022    Procedure: ROBOTIC ASSISTED LAPAROSCOPIC PROSTATECTOMY, RIGHT NERVE SPARING, LEFT WIDE DISSECTION, BILATERAL PELVIC LYMPH NODE DISSECTION;  Surgeon: Mushtaq Rudolph MD;  [facility];  Service: Robotics - DaVinci;  Laterality: N/A;    SKIN BIOPSY       Social History     Socioeconomic History    Marital status:     Number of children: 1   Tobacco Use    Smoking status: Never     Passive exposure: Never    Smokeless tobacco: Never   Vaping Use    Vaping status: Never Used   Substance and Sexual Activity    Alcohol use: Not Currently     Comment: Weekly    Drug use: Never    Sexual activity: Not Currently     Partners: Female       Current Outpatient Medications:     aspirin 81 MG EC tablet, Take 1 tablet by mouth Daily., Disp: , Rfl:     atorvastatin (LIPITOR) 10 MG tablet, TAKE 1 TABLET BY MOUTH ONCE DAILY AT NIGHT, Disp: 30 tablet, Rfl: 0    metoprolol succinate XL (TOPROL-XL) 50 MG 24 hr tablet, TAKE 1 TABLET BY MOUTH ONCE DAILY AT NIGHT, Disp: 30 tablet, Rfl: 5    Multiple Vitamins-Minerals (OCUVITE ADULT FORMULA PO), Take 1 capsule by mouth Daily., Disp: , Rfl:      Allergies   Allergen Reactions    Hydrochlorothiazide Other (See Comments)     Increased incontinence      Objective     There were no vitals filed for this visit.  There is no height or weight on file to calculate BMI.  Gen: well developed, sitting up on exam table, comfortable appearing  HEENT: MMM, sclera anicteric, conjunctiva normal, no carotid bruits  CV: regular rate, regular rhythm, no murmurs or rubs, normal S1, S2. 2+ radial and PT pulses  Pulm: RA, normal work of  breathing, no wheezes, rales, rhonchi  Ext: normal bulk for age, normal tone, no dependent edema  Neuro: alert, oriented, face symmetrical, moving all extremities well  Psych: normal mood, appropriate affect    Most recent PCP note, imaging tests, and labs reviewed.    4/18/23 - CT CAC Scoring  Agatston scores for individual coronary arteries are as follows:  Left main (LM): 0  Left anterior descending (LAD): 132.1  Left circumflex (CX): 141.8  Right coronary artery (RCA): 739.5  Total 1013.4    Labs:  Lab Results   Component Value Date    WBC 5.86 12/13/2023    HGB 16.0 12/13/2023    HCT 46.8 12/13/2023    MCV 90.9 12/13/2023     12/13/2023     Lab Results   Component Value Date    GLUCOSE 91 12/13/2023    BUN 16 12/13/2023    CREATININE 0.97 12/13/2023    EGFRIFNONA 75 03/14/2017    BCR 16.5 12/13/2023    K 4.0 12/13/2023    CO2 26.0 12/13/2023    CALCIUM 9.4 12/13/2023    ALBUMIN 4.7 12/13/2023    AST 29 12/13/2023    ALT 34 12/13/2023     Lab Results   Component Value Date    HGBA1C 5.50 08/24/2022     Lab Results   Component Value Date    CHOL 130 12/13/2023    TRIG 71 12/13/2023    HDL 35 (L) 12/13/2023    LDL 81 12/13/2023       Procedures    Advance Care Planning   ACP discussion was declined by the patient. Patient has an advance directive in EMR which is still valid.        Assessment & Plan       ICD-10-CM ICD-9-CM   1. Elevated coronary artery calcium score  R93.1 414.00   2. Primary hypertension  I10 401.9   3. Mixed hyperlipidemia  E78.2 272.2        Preclinical ASCVD  Coronary calcium score approximately 90th percentile for age   - continue 81 mg daily, atorvastatin 40 mg daily   - Continue following blood pressure   - Continue dietary changes including decreased consumption of red meat and increased vegetables and whole grains    Hypertension   - Acceptable control today, continue to follow    Hyperlipidemia   - Atorvastatin, had SE with 40, back to 10mg   - Last LDL 81, improved from 146  prior to statin initiation     Return in about 1 year (around 5/1/2025).    SAMRA Finn MD  05/01/24    [facility] Cardiology  [address] [address] [phone]

## 2024-05-01 NOTE — PROGRESS NOTES
May 1, 2024    Hello, may I speak with James Gary Meese? Yes.    My name is Tess GUEVARA      I am  with [facility] CARDIOLOGY  [address]  [address]  [facility] [address] [phone].    Before we get started may I verify your date of birth? 1950, Yes, correct.    I am calling to officially welcome you to our practice and ask about your recent visit. Is this a good time to talk? Yes.    Tell me about your visit with us. What things went well?  Everything.       We're always looking for ways to make our patients' experiences even better. Do you have recommendations on ways we may improve?  No, everyone was very nice, greeting @ door and nurse was very nice too.    Overall were you satisfied with your first visit to our practice? Yes.       I appreciate you taking the time to speak with me today. Is there anything else I can do for you? No.      Thank you, and have a great day.

## 2024-06-07 RX ORDER — METOPROLOL SUCCINATE 50 MG/1
50 TABLET, EXTENDED RELEASE ORAL NIGHTLY
Qty: 30 TABLET | Refills: 0 | Status: SHIPPED | OUTPATIENT
Start: 2024-06-07

## 2024-06-14 ENCOUNTER — OFFICE VISIT (OUTPATIENT)
Dept: INTERNAL MEDICINE | Facility: CLINIC | Age: 74
End: 2024-06-14
Payer: MEDICARE

## 2024-06-14 VITALS
HEART RATE: 64 BPM | BODY MASS INDEX: 25.39 KG/M2 | RESPIRATION RATE: 16 BRPM | HEIGHT: 68 IN | DIASTOLIC BLOOD PRESSURE: 72 MMHG | TEMPERATURE: 97.8 F | SYSTOLIC BLOOD PRESSURE: 132 MMHG | WEIGHT: 167.5 LBS

## 2024-06-14 DIAGNOSIS — Z00.00 MEDICARE ANNUAL WELLNESS VISIT, SUBSEQUENT: Primary | ICD-10-CM

## 2024-06-14 DIAGNOSIS — R93.1 ELEVATED CORONARY ARTERY CALCIUM SCORE: ICD-10-CM

## 2024-06-14 DIAGNOSIS — G47.33 OBSTRUCTIVE SLEEP APNEA SYNDROME: ICD-10-CM

## 2024-06-14 DIAGNOSIS — M19.91 PRIMARY OSTEOARTHRITIS, UNSPECIFIED SITE: ICD-10-CM

## 2024-06-14 DIAGNOSIS — C61 PROSTATE CANCER: ICD-10-CM

## 2024-06-14 DIAGNOSIS — I10 PRIMARY HYPERTENSION: ICD-10-CM

## 2024-06-14 LAB
BASOPHILS # BLD AUTO: 0.04 10*3/MM3 (ref 0–0.2)
BASOPHILS NFR BLD AUTO: 0.5 % (ref 0–1.5)
DEPRECATED RDW RBC AUTO: 41.4 FL (ref 37–54)
EOSINOPHIL # BLD AUTO: 0.13 10*3/MM3 (ref 0–0.4)
EOSINOPHIL NFR BLD AUTO: 1.8 % (ref 0.3–6.2)
ERYTHROCYTE [DISTWIDTH] IN BLOOD BY AUTOMATED COUNT: 12.1 % (ref 12.3–15.4)
HCT VFR BLD AUTO: 48.7 % (ref 37.5–51)
HGB BLD-MCNC: 15.9 G/DL (ref 13–17.7)
IMM GRANULOCYTES # BLD AUTO: 0.04 10*3/MM3 (ref 0–0.05)
IMM GRANULOCYTES NFR BLD AUTO: 0.5 % (ref 0–0.5)
LYMPHOCYTES # BLD AUTO: 1.33 10*3/MM3 (ref 0.7–3.1)
LYMPHOCYTES NFR BLD AUTO: 18.3 % (ref 19.6–45.3)
MCH RBC QN AUTO: 30.1 PG (ref 26.6–33)
MCHC RBC AUTO-ENTMCNC: 32.6 G/DL (ref 31.5–35.7)
MCV RBC AUTO: 92.2 FL (ref 79–97)
MONOCYTES # BLD AUTO: 0.52 10*3/MM3 (ref 0.1–0.9)
MONOCYTES NFR BLD AUTO: 7.1 % (ref 5–12)
NEUTROPHILS NFR BLD AUTO: 5.22 10*3/MM3 (ref 1.7–7)
NEUTROPHILS NFR BLD AUTO: 71.8 % (ref 42.7–76)
NRBC BLD AUTO-RTO: 0 /100 WBC (ref 0–0.2)
PLATELET # BLD AUTO: 286 10*3/MM3 (ref 140–450)
PMV BLD AUTO: 10.1 FL (ref 6–12)
RBC # BLD AUTO: 5.28 10*6/MM3 (ref 4.14–5.8)
WBC NRBC COR # BLD AUTO: 7.28 10*3/MM3 (ref 3.4–10.8)

## 2024-06-14 PROCEDURE — 36415 COLL VENOUS BLD VENIPUNCTURE: CPT | Performed by: INTERNAL MEDICINE

## 2024-06-14 PROCEDURE — 3075F SYST BP GE 130 - 139MM HG: CPT | Performed by: INTERNAL MEDICINE

## 2024-06-14 PROCEDURE — 3078F DIAST BP <80 MM HG: CPT | Performed by: INTERNAL MEDICINE

## 2024-06-14 PROCEDURE — 85025 COMPLETE CBC W/AUTO DIFF WBC: CPT | Performed by: INTERNAL MEDICINE

## 2024-06-14 PROCEDURE — 84443 ASSAY THYROID STIM HORMONE: CPT | Performed by: INTERNAL MEDICINE

## 2024-06-14 PROCEDURE — 80061 LIPID PANEL: CPT | Performed by: INTERNAL MEDICINE

## 2024-06-14 PROCEDURE — G0439 PPPS, SUBSEQ VISIT: HCPCS | Performed by: INTERNAL MEDICINE

## 2024-06-14 PROCEDURE — 80053 COMPREHEN METABOLIC PANEL: CPT | Performed by: INTERNAL MEDICINE

## 2024-06-14 PROCEDURE — 1170F FXNL STATUS ASSESSED: CPT | Performed by: INTERNAL MEDICINE

## 2024-06-14 PROCEDURE — 1159F MED LIST DOCD IN RCRD: CPT | Performed by: INTERNAL MEDICINE

## 2024-06-14 PROCEDURE — 1126F AMNT PAIN NOTED NONE PRSNT: CPT | Performed by: INTERNAL MEDICINE

## 2024-06-14 PROCEDURE — 99214 OFFICE O/P EST MOD 30 MIN: CPT | Performed by: INTERNAL MEDICINE

## 2024-06-14 RX ORDER — METOPROLOL SUCCINATE 50 MG/1
50 TABLET, EXTENDED RELEASE ORAL NIGHTLY
Qty: 90 TABLET | Refills: 3 | Status: SHIPPED | OUTPATIENT
Start: 2024-06-14

## 2024-06-14 NOTE — PROGRESS NOTES
Subjective       James Gary Meese is a 73 y.o. male.     Chief Complaint   Patient presents with    Medicare Wellness-subsequent    Hypertension     6-month follow-up    OTHER     Coronary Artery Calcification       History obtained from the patient.      History of Present Illness     The patient was diagnosed with Prostate Cancer in May 2022.  He had a Robotic Assisted Laparoscopic Prostatectomy on 8/26/2022.  He last saw Dr. Rudolph on 8/17/2023.  Urinalysis was negative (trace blood).  PSA was < 0.014.  He has had pelvic floor PT due to urinary incontinence, which is stable.  He has nocturia (2 episodes per night).  He denies any dysuria, hematuria, frequency, urgency, and problems with the urinary stream.       He has mild DAGMAR.  He states he has been off CPAP (his machine is very old).  He denies snoring and apnea.     He has mild Osteoarthritis, stable on no medication.     Cardiac Follow-Up: The patient is here today for a follow-up visit.      His Hypertension has been stable   Medication: Toprol-XL  Side Effects: None.     Comorbid Illness: Coronary Artery Calcification, on Aspirin and Atorvastatin.     Procedures: On 4/18/2023, Coronary Artery Calcium Score was 1013.4.  He has not had a cardiac stress test.     Interval Events:   On 12/13/2023, LDL was 81 and TG 71.  He last saw Dr. Finn on 5/1/2023.  His Atorvastatin was increased to 40 mg daily, but the patient states he remained on 10 mg daily.  He does not check his blood pressure at home.     Symptoms:  Denies chest pain, shortness of breath, GAVIN, orthopnea, PND, lower extremity edema, claudication, lightheadedness, and dizziness.    Associated Symptoms: Weight increased to pounds in the past 6 months.  Denies fatigue, headache, polyuria, polydipsia, myalgias, arthralgias, visual impairment, memory loss, or concentration issues.     Lifestyle: He consumes a diverse and healthy diet.  He walks 2-3 miles daily.  He also does light calisthenics and  "push-ups.  Tobacco Use: Never a smoker.           Current Outpatient Medications on File Prior to Visit   Medication Sig Dispense Refill    aspirin 81 MG EC tablet Take 1 tablet by mouth Daily.      atorvastatin (LIPITOR) 10 MG tablet Take 1 tablet by mouth Every Night. 90 tablet 3    Multiple Vitamins-Minerals (OCUVITE ADULT FORMULA PO) Take 1 capsule by mouth Daily.       No current facility-administered medications on file prior to visit.       Current outpatient and discharge medications have been reconciled for the patient.  Reviewed by: Breana Harden MD        The following portions of the patient's history were reviewed and updated as appropriate: allergies, current medications, past family history, past medical history, past social history, past surgical history, and problem list.    Review of Systems   Constitutional:  Negative for fatigue and unexpected weight change.   Eyes:  Negative for visual disturbance.   Respiratory:  Negative for cough, shortness of breath and wheezing.    Cardiovascular:  Negative for chest pain, palpitations and leg swelling.        No GAVIN, orthopnea, or claudication.   Gastrointestinal:  Negative for abdominal pain, blood in stool, constipation, diarrhea, nausea and vomiting.        Denies melena.   Endocrine: Negative for polydipsia, polyphagia and polyuria.   Genitourinary:  Positive for frequency (since prostate surgery) and urgency (mild, since prostate surgery). Negative for difficulty urinating, dysuria and hematuria.   Musculoskeletal:  Negative for arthralgias and myalgias.   Neurological:  Negative for dizziness, syncope, light-headedness and headaches.        No memory issues.   Psychiatric/Behavioral:  Negative for decreased concentration.          Objective       Blood pressure 132/72, pulse 64, temperature 97.8 °F (36.6 °C), temperature source Infrared, resp. rate 16, height 172.7 cm (68\"), weight 76 kg (167 lb 8 oz).  Body mass index is 25.47 kg/m².      Physical " Exam  Vitals and nursing note reviewed.   Constitutional:       Appearance: He is well-developed.      Comments: BMI greater than 25   Neck:      Thyroid: No thyroid mass or thyromegaly.      Vascular: No carotid bruit.   Cardiovascular:      Rate and Rhythm: Normal rate and regular rhythm.      Pulses: Normal pulses.      Heart sounds: Normal heart sounds. No murmur heard.     No friction rub. No gallop.   Pulmonary:      Effort: Pulmonary effort is normal.      Breath sounds: Normal breath sounds.   Abdominal:      General: Bowel sounds are normal. There is no distension or abdominal bruit.      Palpations: Abdomen is soft. There is no hepatomegaly, splenomegaly or mass.      Tenderness: There is no abdominal tenderness.   Musculoskeletal:      Cervical back: Normal range of motion and neck supple.      Right lower leg: No edema.      Left lower leg: No edema.   Neurological:      Mental Status: He is alert.   Psychiatric:         Mood and Affect: Mood normal.         Assessment / Plan:  Diagnoses and all orders for this visit:    1. Medicare annual wellness visit, subsequent (Primary)    2. Primary hypertension  -     metoprolol succinate XL (TOPROL-XL) 50 MG 24 hr tablet; Take 1 tablet by mouth Every Night.  Dispense: 90 tablet; Refill: 3- REFILL  -     Lipid Panel  -     Comprehensive Metabolic Panel  -     TSH  -     CBC & Differential   Continue current medication(s) as noted in the history of present illness.    3. Elevated coronary artery calcium score  -     Lipid Panel  -     Comprehensive Metabolic Panel  -     TSH  -     CBC & Differential   Continue current medication(s) as noted in the history of present illness.   Follow up per Cardiology.    4. Prostate cancer   Follow up per Urology.    5. Primary osteoarthritis, unspecified site   Stable, no medication.    6. Obstructive sleep apnea syndrome   Stable, no CPAP      I recommended Shingrix (Shingles vaccine) and Hepatitis A vaccine at the  pharmacy.    I recommended COVID-19 bivalent vaccine, Influenza vaccine, and RSV vaccine at the pharmacy September 2024.      BMI is >= 25 and <30. (Overweight) The following options were offered after discussion;: exercise counseling/recommendations and nutrition counseling/recommendations        Return in about 6 months (around 12/14/2024) for Recheck HTN, fasting.

## 2024-06-14 NOTE — PROGRESS NOTES
The ABCs of the Annual Wellness Visit  Subsequent Medicare Wellness Visit    Subjective      James Gary Meese is a 73 y.o. male who presents for a Subsequent Medicare Wellness Visit.    The following portions of the patient's history were reviewed and   updated as appropriate: allergies, current medications, past family history, past medical history, past social history, past surgical history, and problem list.    Compared to one year ago, the patient feels his physical   health is the same.    Compared to one year ago, the patient feels his mental   health is the same.    Recent Hospitalizations:  He was not admitted to the hospital during the last year.       Current Medical Providers:  Patient Care Team:  Breana Harden MD as PCP - General  Breana Harden MD as PCP - Family Medicine  Mushtaq Rudolph MD as Consulting Physician (Urology)  Geovanni Finn MD as Cardiologist (Cardiology)    Outpatient Medications Prior to Visit   Medication Sig Dispense Refill    aspirin 81 MG EC tablet Take 1 tablet by mouth Daily.      atorvastatin (LIPITOR) 10 MG tablet Take 1 tablet by mouth Every Night. 90 tablet 3    Multiple Vitamins-Minerals (OCUVITE ADULT FORMULA PO) Take 1 capsule by mouth Daily.      metoprolol succinate XL (TOPROL-XL) 50 MG 24 hr tablet TAKE 1 TABLET BY MOUTH ONCE DAILY AT NIGHT 30 tablet 0     No facility-administered medications prior to visit.       No opioid medication identified on active medication list. I have reviewed chart for other potential  high risk medication/s and harmful drug interactions in the elderly.        Aspirin is on active medication list. Aspirin use is indicated based on review of current medical condition/s. Pros and cons of this therapy have been discussed today. Benefits of this medication outweigh potential harm.  Patient has been encouraged to continue taking this medication.  .      Patient Active Problem List   Diagnosis    Simple renal cyst    Hypertension     "Obstructive sleep apnea syndrome    Osteoarthritis    Basal cell carcinoma (BCC) of skin of nose    SCC (squamous cell carcinoma), face    Melanoma in situ of torso excluding breast    Prostate cancer    Elevated coronary artery calcium score     Advance Care Planning   Advance Care Planning     Advance Directive is on file.  ACP discussion was held with the patient during this visit. Patient has an advance directive in EMR which is still valid.   ACP information on the AVS declined by the patient.       Objective    Vitals:    24 1306   BP: 132/72   BP Location: Right arm   Patient Position: Sitting   Cuff Size: Adult   Pulse: 64   Resp: 16   Temp: 97.8 °F (36.6 °C)   TempSrc: Infrared   Weight: 76 kg (167 lb 8 oz)   Height: 172.7 cm (68\")   PainSc: 0-No pain     Estimated body mass index is 25.47 kg/m² as calculated from the following:    Height as of this encounter: 172.7 cm (68\").    Weight as of this encounter: 76 kg (167 lb 8 oz).    BMI is >= 25 and <30. (Overweight) The following options were offered after discussion;: exercise counseling/recommendations and nutrition counseling/recommendations    Finger Rub Hearing{Test (right ear):passed  Finger Rub Hearing{Test (left ear):passed      Does the patient have evidence of cognitive impairment?   No    Lab Results   Component Value Date    TRIG 74 2024    HDL 39 (L) 2024    LDL 79 2024    VLDL 15 2024          HEALTH RISK ASSESSMENT    Smoking Status:  Social History     Tobacco Use   Smoking Status Never    Passive exposure: Never   Smokeless Tobacco Never     Alcohol Consumption:  Social History     Substance and Sexual Activity   Alcohol Use Not Currently    Comment: Weekly     Fall Risk Screen:    STEADI Fall Risk Assessment was completed, and patient is at LOW risk for falls.Assessment completed on:2024    Depression Screenin/14/2024     1:09 PM   PHQ-2/PHQ-9 Depression Screening   Little Interest or Pleasure in " Doing Things 0-->not at all   Feeling Down, Depressed or Hopeless 0-->not at all   PHQ-9: Brief Depression Severity Measure Score 0       Health Habits and Functional and Cognitive Screenin/14/2024     1:06 PM   Functional & Cognitive Status   Do you have difficulty preparing food and eating? No   Do you have difficulty bathing yourself, getting dressed or grooming yourself? No   Do you have difficulty using the toilet? No   Do you have difficulty moving around from place to place? No   Do you have trouble with steps or getting out of a bed or a chair? No   Current Diet Well Balanced Diet   Dental Exam Up to date   Eye Exam Up to date   Exercise (times per week) 6 times per week   Current Exercises Include Walking   Do you need help using the phone?  No   Are you deaf or do you have serious difficulty hearing?  No   Do you need help to go to places out of walking distance? No   Do you need help shopping? No   Do you need help preparing meals?  No   Do you need help with housework?  No   Do you need help with laundry? No   Do you need help taking your medications? No   Do you need help managing money? No   Do you ever drive or ride in a car without wearing a seat belt? No   Have you felt unusual stress, anger or loneliness in the last month? No   Who do you live with? Spouse   If you need help, do you have trouble finding someone available to you? No   Have you been bothered in the last four weeks by sexual problems? No   Do you have difficulty concentrating, remembering or making decisions? No       Age-appropriate Screening Schedule:  Refer to the list below for future screening recommendations based on patient's age, sex and/or medical conditions. Orders for these recommended tests are listed in the plan section. The patient has been provided with a written plan.    Health Maintenance   Topic Date Due    ZOSTER VACCINE (1 of 2) Never done    RSV Vaccine - Adults (1 - 1-dose 60+ series) Never done     COVID-19 Vaccine (3 - 2023-24 season) 09/01/2023    PROSTATE CANCER SCREENING  08/14/2024    INFLUENZA VACCINE  08/01/2024    ANNUAL WELLNESS VISIT  06/14/2025    LIPID PANEL  06/14/2025    BMI FOLLOWUP  06/14/2025    COLORECTAL CANCER SCREENING  10/31/2025    TDAP/TD VACCINES (3 - Td or Tdap) 03/14/2027    HEPATITIS C SCREENING  Completed    Pneumococcal Vaccine 65+  Completed                  CMS Preventative Services Quick Reference  Risk Factors Identified During Encounter:    Immunizations Discussed/Encouraged: Hepatitis A Vaccine/Series, Influenza, Shingrix, COVID19, and RSV (Respiratory Syncytial Virus)  Urinary Incontinence:  Follow-up per Urology  Dental Screening Recommended  Vision Screening Recommended    The above risks/problems have been discussed with the patient.  Pertinent information has been shared with the patient in the After Visit Summary.    Diagnoses and all orders for this visit:    1. Medicare annual wellness visit, subsequent (Primary)    2. Primary hypertension  -     metoprolol succinate XL (TOPROL-XL) 50 MG 24 hr tablet; Take 1 tablet by mouth Every Night.  Dispense: 90 tablet; Refill: 3  -     Lipid Panel; Future  -     Comprehensive Metabolic Panel; Future  -     TSH; Future  -     CBC & Differential; Future  -     Lipid Panel  -     Comprehensive Metabolic Panel  -     TSH  -     CBC & Differential    3. Elevated coronary artery calcium score  -     Lipid Panel; Future  -     Comprehensive Metabolic Panel; Future  -     TSH; Future  -     CBC & Differential; Future  -     Lipid Panel  -     Comprehensive Metabolic Panel  -     TSH  -     CBC & Differential    4. Prostate cancer    5. Primary osteoarthritis, unspecified site    6. Obstructive sleep apnea syndrome        Follow Up:   Next Medicare Wellness visit to be scheduled in 1 year.      An After Visit Summary and PPPS were made available to the patient.

## 2024-06-14 NOTE — PATIENT INSTRUCTIONS
I recommend Shingrix (Shingles vaccine) and Hepatitis A vaccine at the pharmacy, as we discussed.    I recommend COVID-19 bivalent vaccine, Influenza vaccine, and RSV vaccine at the pharmacy September 2024, as we discussed.    MyPlate from USDA    MyPlate is an outline of a general healthy diet based on the Dietary Guidelines for Americans, 6648-5732, from the U.S. Department of Agriculture (USDA). It sets guidelines for how much food you should eat from each food group based on your age, sex, and level of physical activity.  What are tips for following MyPlate?  To follow MyPlate recommendations:  Eat a wide variety of fruits and vegetables, grains, and protein foods.  Serve smaller portions and eat less food throughout the day.  Limit portion sizes to avoid overeating.  Enjoy your food.  Get at least 150 minutes of exercise every week. This is about 30 minutes each day, 5 or more days per week.  It can be difficult to have every meal look like MyPlate. Think about MyPlate as eating guidelines for an entire day, rather than each individual meal.  Fruits and vegetables  Make one half of your plate fruits and vegetables.  Eat many different colors of fruits and vegetables each day.  For a 2,000-calorie daily food plan, eat:  2½ cups of vegetables every day.  2 cups of fruit every day.  1 cup is equal to:  1 cup raw or cooked vegetables.  1 cup raw fruit.  1 medium-sized orange, apple, or banana.  1 cup 100% fruit or vegetable juice.  2 cups raw leafy greens, such as lettuce, spinach, or kale.  ½ cup dried fruit.  Grains  One fourth of your plate should be grains.  Make at least half of the grains you eat each day whole grains.  For a 2,000-calorie daily food plan, eat 6 oz of grains every day.  1 oz is equal to:  1 slice bread.  1 cup cereal.  ½ cup cooked rice, cereal, or pasta.  Protein  One fourth of your plate should be protein.  Eat a wide variety of protein foods, including meat, poultry, fish, eggs, beans,  nuts, and tofu.  For a 2,000-calorie daily food plan, eat 5½ oz of protein every day.  1 oz is equal to:  1 oz meat, poultry, or fish.  ¼ cup cooked beans.  1 egg.  ½ oz nuts or seeds.  1 Tbsp peanut butter.  Dairy  Drink fat-free or low-fat (1%) milk.  Eat or drink dairy as a side to meals.  For a 2,000-calorie daily food plan, eat or drink 3 cups of dairy every day.  1 cup is equal to:  1 cup milk, yogurt, cottage cheese, or soy milk (soy beverage).  2 oz processed cheese.  1½ oz natural cheese.  Fats, oils, salt, and sugars  Only small amounts of oils are recommended.  Avoid foods that are high in calories and low in nutritional value (empty calories), like foods high in fat or added sugars.  Choose foods that are low in salt (sodium). Choose foods that have less than 140 milligrams (mg) of sodium per serving.  Drink water instead of sugary drinks. Drink enough fluid to keep your urine pale yellow.  Where to find support  Work with your health care provider or a dietitian to develop a customized eating plan that is right for you.  Download an lynnette (mobile application) to help you track your daily food intake.  Where to find more information  USDA: ChooseMyPlate.gov  Summary  MyPlate is a general guideline for healthy eating from the USDA. It is based on the Dietary Guidelines for Americans, 1824-5139.  In general, fruits and vegetables should take up one half of your plate, grains should take up one fourth of your plate, and protein should take up one fourth of your plate.  This information is not intended to replace advice given to you by your health care provider. Make sure you discuss any questions you have with your health care provider.  Document Revised: 11/08/2021 Document Reviewed: 11/08/2021  [employer] Patient Education © 2024 [employer] Inc.      Exercising to Stay Healthy  To become healthy and stay healthy, it is recommended that you do moderate-intensity and vigorous-intensity exercise. You can tell that  you are exercising at a moderate intensity if your heart starts beating faster and you start breathing faster but can still hold a conversation. You can tell that you are exercising at a vigorous intensity if you are breathing much harder and faster and cannot hold a conversation while exercising.  How can exercise benefit me?  Exercising regularly is important. It has many health benefits, such as:  Improving overall fitness, flexibility, and endurance.  Increasing bone density.  Helping with weight control.  Decreasing body fat.  Increasing muscle strength and endurance.  Reducing stress and tension, anxiety, depression, or anger.  Improving overall health.  What guidelines should I follow while exercising?  Before you start a new exercise program, talk with your health care provider.  Do not exercise so much that you hurt yourself, feel dizzy, or get very short of breath.  Wear comfortable clothes and wear shoes with good support.  Drink plenty of water while you exercise to prevent dehydration or heat stroke.  Work out until your breathing and your heartbeat get faster (moderate intensity).  How often should I exercise?  Choose an activity that you enjoy, and set realistic goals. Your health care provider can help you make an activity plan that is individually designed and works best for you.  Exercise regularly as told by your health care provider. This may include:  Doing strength training two times a week, such as:  Lifting weights.  Using resistance bands.  Push-ups.  Sit-ups.  Yoga.  Doing a certain intensity of exercise for a given amount of time. Choose from these options:  A total of 150 minutes of moderate-intensity exercise every week.  A total of 75 minutes of vigorous-intensity exercise every week.  A mix of moderate-intensity and vigorous-intensity exercise every week.  Children, pregnant women, people who have not exercised regularly, people who are overweight, and older adults may need to talk  with a health care provider about what activities are safe to perform. If you have a medical condition, be sure to talk with your health care provider before you start a new exercise program.  What are some exercise ideas?  Moderate-intensity exercise ideas include:  Walking 1 mile (1.6 km) in about 15 minutes.  Biking.  Hiking.  Golfing.  Dancing.  Water aerobics.  Vigorous-intensity exercise ideas include:  Walking 4.5 miles (7.2 km) or more in about 1 hour.  Jogging or running 5 miles (8 km) in about 1 hour.  Biking 10 miles (16.1 km) or more in about 1 hour.  Lap swimming.  Roller-skating or in-line skating.  Cross-country skiing.  Vigorous competitive sports, such as football, basketball, and soccer.  Jumping rope.  Aerobic dancing.  What are some everyday activities that can help me get exercise?  Yard work, such as:  Pushing a .  Raking and bagging leaves.  Washing your car.  Pushing a stroller.  Shoveling snow.  Gardening.  Washing windows or floors.  How can I be more active in my day-to-day activities?  Use stairs instead of an elevator.  Take a walk during your lunch break.  If you drive, park your car farther away from your work or school.  If you take public transportation, get off one stop early and walk the rest of the way.  Stand up or walk around during all of your indoor phone calls.  Get up, stretch, and walk around every 30 minutes throughout the day.  Enjoy exercise with a friend. Support to continue exercising will help you keep a regular routine of activity.  Where to find more information  You can find more information about exercising to stay healthy from:  U.S. Department of Health and Human Services: www.hhs.gov  Centers for Disease Control and Prevention (CDC): www.cdc.gov  Summary  Exercising regularly is important. It will improve your overall fitness, flexibility, and endurance.  Regular exercise will also improve your overall health. It can help you control your weight,  reduce stress, and improve your bone density.  Do not exercise so much that you hurt yourself, feel dizzy, or get very short of breath.  Before you start a new exercise program, talk with your health care provider.  This information is not intended to replace advice given to you by your health care provider. Make sure you discuss any questions you have with your health care provider.  Document Revised: 04/15/2022 Document Reviewed: 04/15/2022  Elsevier Patient Education © 2024 Elsevier Inc.

## 2024-06-15 LAB
ALBUMIN SERPL-MCNC: 4.5 G/DL (ref 3.5–5.2)
ALBUMIN/GLOB SERPL: 2.1 G/DL
ALP SERPL-CCNC: 77 U/L (ref 39–117)
ALT SERPL W P-5'-P-CCNC: 24 U/L (ref 1–41)
ANION GAP SERPL CALCULATED.3IONS-SCNC: 11 MMOL/L (ref 5–15)
AST SERPL-CCNC: 16 U/L (ref 1–40)
BILIRUB SERPL-MCNC: 1 MG/DL (ref 0–1.2)
BUN SERPL-MCNC: 17 MG/DL (ref 8–23)
BUN/CREAT SERPL: 20 (ref 7–25)
CALCIUM SPEC-SCNC: 9.3 MG/DL (ref 8.6–10.5)
CHLORIDE SERPL-SCNC: 106 MMOL/L (ref 98–107)
CHOLEST SERPL-MCNC: 133 MG/DL (ref 0–200)
CO2 SERPL-SCNC: 25 MMOL/L (ref 22–29)
CREAT SERPL-MCNC: 0.85 MG/DL (ref 0.76–1.27)
EGFRCR SERPLBLD CKD-EPI 2021: 92.9 ML/MIN/1.73
GLOBULIN UR ELPH-MCNC: 2.1 GM/DL
GLUCOSE SERPL-MCNC: 63 MG/DL (ref 65–99)
HDLC SERPL-MCNC: 39 MG/DL (ref 40–60)
LDLC SERPL CALC-MCNC: 79 MG/DL (ref 0–100)
LDLC/HDLC SERPL: 2.03 {RATIO}
POTASSIUM SERPL-SCNC: 3.8 MMOL/L (ref 3.5–5.2)
PROT SERPL-MCNC: 6.6 G/DL (ref 6–8.5)
SODIUM SERPL-SCNC: 142 MMOL/L (ref 136–145)
TRIGL SERPL-MCNC: 74 MG/DL (ref 0–150)
TSH SERPL DL<=0.05 MIU/L-ACNC: 1.82 UIU/ML (ref 0.27–4.2)
VLDLC SERPL-MCNC: 15 MG/DL (ref 5–40)

## 2024-08-26 ENCOUNTER — LAB (OUTPATIENT)
Dept: LAB | Facility: HOSPITAL | Age: 74
End: 2024-08-26
Payer: MEDICARE

## 2024-08-26 ENCOUNTER — TELEPHONE (OUTPATIENT)
Dept: UROLOGY | Facility: CLINIC | Age: 74
End: 2024-08-26
Payer: MEDICARE

## 2024-08-26 DIAGNOSIS — R97.20 ELEVATED PROSTATE SPECIFIC ANTIGEN (PSA): ICD-10-CM

## 2024-08-26 DIAGNOSIS — R97.20 ELEVATED PROSTATE SPECIFIC ANTIGEN (PSA): Primary | ICD-10-CM

## 2024-08-26 PROCEDURE — 84153 ASSAY OF PSA TOTAL: CPT

## 2024-08-26 PROCEDURE — 36415 COLL VENOUS BLD VENIPUNCTURE: CPT

## 2024-08-26 PROCEDURE — 84154 ASSAY OF PSA FREE: CPT

## 2024-08-26 NOTE — TELEPHONE ENCOUNTER
Caller: Meese, James Gary     Relationship: SELF    Best call back number:  [phone]     What orders are you requesting (i.e. lab or imaging): LABS    In what timeframe would the patient need to come in: THIS AFTERNOON    Where will you receive your lab/imaging services:      Additional notes:  LAB ORDER IN CHART IS . PT GOING THIS AFTERNOON TO HAVE LABS DONE.

## 2024-08-27 LAB
PSA FREE MFR SERPL: NORMAL %
PSA FREE SERPL-MCNC: <0.02 NG/ML
PSA SERPL-MCNC: <0.1 NG/ML (ref 0–4)

## 2024-08-28 ENCOUNTER — TELEPHONE (OUTPATIENT)
Age: 74
End: 2024-08-28
Payer: MEDICARE

## 2024-08-28 NOTE — TELEPHONE ENCOUNTER
"Relay     \"\"Your appointment with Dr. Rudolph on 08/29/24 has been canceled as Dr. Rudolph will be out of the office that day. We have been unable to contact you to get this rescheduled. Please call [phone] and we will get your appointment rescheduled at your convenience. Thank you and have a great day. HUB - OK to schedule \" \"  \"                  "

## 2024-09-17 ENCOUNTER — OFFICE VISIT (OUTPATIENT)
Dept: UROLOGY | Facility: CLINIC | Age: 74
End: 2024-09-17
Payer: MEDICARE

## 2024-09-17 ENCOUNTER — LAB (OUTPATIENT)
Dept: LAB | Facility: HOSPITAL | Age: 74
End: 2024-09-17
Payer: MEDICARE

## 2024-09-17 VITALS — SYSTOLIC BLOOD PRESSURE: 151 MMHG | OXYGEN SATURATION: 98 % | DIASTOLIC BLOOD PRESSURE: 74 MMHG | HEART RATE: 55 BPM

## 2024-09-17 DIAGNOSIS — C61 PROSTATE CANCER: Primary | ICD-10-CM

## 2024-09-17 DIAGNOSIS — N32.81 OVERACTIVE BLADDER: ICD-10-CM

## 2024-09-17 DIAGNOSIS — N39.41 URGENCY INCONTINENCE: ICD-10-CM

## 2024-09-17 DIAGNOSIS — C61 PROSTATE CANCER: ICD-10-CM

## 2024-09-17 LAB — PSA SERPL-MCNC: <0.014 NG/ML (ref 0–4)

## 2024-09-17 PROCEDURE — 84153 ASSAY OF PSA TOTAL: CPT

## 2024-09-17 PROCEDURE — 36415 COLL VENOUS BLD VENIPUNCTURE: CPT

## 2024-12-13 ENCOUNTER — OFFICE VISIT (OUTPATIENT)
Dept: INTERNAL MEDICINE | Facility: CLINIC | Age: 74
End: 2024-12-13
Payer: MEDICARE

## 2024-12-13 VITALS
WEIGHT: 163.6 LBS | BODY MASS INDEX: 24.88 KG/M2 | TEMPERATURE: 97.8 F | DIASTOLIC BLOOD PRESSURE: 80 MMHG | RESPIRATION RATE: 18 BRPM | SYSTOLIC BLOOD PRESSURE: 140 MMHG | HEART RATE: 64 BPM

## 2024-12-13 DIAGNOSIS — C61 PROSTATE CANCER: ICD-10-CM

## 2024-12-13 DIAGNOSIS — R93.1 ELEVATED CORONARY ARTERY CALCIUM SCORE: ICD-10-CM

## 2024-12-13 DIAGNOSIS — I10 PRIMARY HYPERTENSION: Primary | ICD-10-CM

## 2024-12-13 DIAGNOSIS — M19.91 PRIMARY OSTEOARTHRITIS, UNSPECIFIED SITE: ICD-10-CM

## 2024-12-13 DIAGNOSIS — G47.33 OBSTRUCTIVE SLEEP APNEA SYNDROME: ICD-10-CM

## 2024-12-13 LAB
ALBUMIN SERPL-MCNC: 4.4 G/DL (ref 3.5–5.2)
ALBUMIN/GLOB SERPL: 1.9 G/DL
ALP SERPL-CCNC: 76 U/L (ref 39–117)
ALT SERPL W P-5'-P-CCNC: 26 U/L (ref 1–41)
ANION GAP SERPL CALCULATED.3IONS-SCNC: 10.8 MMOL/L (ref 5–15)
AST SERPL-CCNC: 26 U/L (ref 1–40)
BASOPHILS # BLD AUTO: 0.05 10*3/MM3 (ref 0–0.2)
BASOPHILS NFR BLD AUTO: 0.8 % (ref 0–1.5)
BILIRUB BLD-MCNC: NEGATIVE MG/DL
BILIRUB SERPL-MCNC: 1 MG/DL (ref 0–1.2)
BUN SERPL-MCNC: 14 MG/DL (ref 8–23)
BUN/CREAT SERPL: 12.8 (ref 7–25)
CALCIUM SPEC-SCNC: 9.6 MG/DL (ref 8.6–10.5)
CHLORIDE SERPL-SCNC: 102 MMOL/L (ref 98–107)
CHOLEST SERPL-MCNC: 129 MG/DL (ref 0–200)
CLARITY, POC: CLEAR
CO2 SERPL-SCNC: 27.2 MMOL/L (ref 22–29)
COLOR UR: YELLOW
CREAT SERPL-MCNC: 1.09 MG/DL (ref 0.76–1.27)
DEPRECATED RDW RBC AUTO: 36.6 FL (ref 37–54)
EGFRCR SERPLBLD CKD-EPI 2021: 72.1 ML/MIN/1.73
EOSINOPHIL # BLD AUTO: 0.1 10*3/MM3 (ref 0–0.4)
EOSINOPHIL NFR BLD AUTO: 1.5 % (ref 0.3–6.2)
ERYTHROCYTE [DISTWIDTH] IN BLOOD BY AUTOMATED COUNT: 11.5 % (ref 12.3–15.4)
EXPIRATION DATE: ABNORMAL
GLOBULIN UR ELPH-MCNC: 2.3 GM/DL
GLUCOSE SERPL-MCNC: 82 MG/DL (ref 65–99)
GLUCOSE UR STRIP-MCNC: NEGATIVE MG/DL
HCT VFR BLD AUTO: 50.3 % (ref 37.5–51)
HDLC SERPL-MCNC: 37 MG/DL (ref 40–60)
HGB BLD-MCNC: 17.7 G/DL (ref 13–17.7)
IMM GRANULOCYTES # BLD AUTO: 0.06 10*3/MM3 (ref 0–0.05)
IMM GRANULOCYTES NFR BLD AUTO: 0.9 % (ref 0–0.5)
KETONES UR QL: NEGATIVE
LDLC SERPL CALC-MCNC: 77 MG/DL (ref 0–100)
LDLC/HDLC SERPL: 2.1 {RATIO}
LEUKOCYTE EST, POC: NEGATIVE
LYMPHOCYTES # BLD AUTO: 1.13 10*3/MM3 (ref 0.7–3.1)
LYMPHOCYTES NFR BLD AUTO: 17.4 % (ref 19.6–45.3)
Lab: ABNORMAL
MCH RBC QN AUTO: 31.5 PG (ref 26.6–33)
MCHC RBC AUTO-ENTMCNC: 35.2 G/DL (ref 31.5–35.7)
MCV RBC AUTO: 89.5 FL (ref 79–97)
MONOCYTES # BLD AUTO: 0.47 10*3/MM3 (ref 0.1–0.9)
MONOCYTES NFR BLD AUTO: 7.2 % (ref 5–12)
NEUTROPHILS NFR BLD AUTO: 4.69 10*3/MM3 (ref 1.7–7)
NEUTROPHILS NFR BLD AUTO: 72.2 % (ref 42.7–76)
NITRITE UR-MCNC: NEGATIVE MG/ML
NRBC BLD AUTO-RTO: 0 /100 WBC (ref 0–0.2)
PH UR: 5 [PH] (ref 5–8)
PLATELET # BLD AUTO: 328 10*3/MM3 (ref 140–450)
PMV BLD AUTO: 9.4 FL (ref 6–12)
POTASSIUM SERPL-SCNC: 4.4 MMOL/L (ref 3.5–5.2)
PROT SERPL-MCNC: 6.7 G/DL (ref 6–8.5)
PROT UR STRIP-MCNC: NEGATIVE MG/DL
PSA SERPL-MCNC: <0.014 NG/ML (ref 0–4)
RBC # BLD AUTO: 5.62 10*6/MM3 (ref 4.14–5.8)
RBC # UR STRIP: ABNORMAL /UL
SODIUM SERPL-SCNC: 140 MMOL/L (ref 136–145)
SP GR UR: 1.02 (ref 1–1.03)
TRIGL SERPL-MCNC: 72 MG/DL (ref 0–150)
TSH SERPL DL<=0.05 MIU/L-ACNC: 2.11 UIU/ML (ref 0.27–4.2)
UROBILINOGEN UR QL: NORMAL
VLDLC SERPL-MCNC: 15 MG/DL (ref 5–40)
WBC NRBC COR # BLD AUTO: 6.5 10*3/MM3 (ref 3.4–10.8)

## 2024-12-13 PROCEDURE — 84443 ASSAY THYROID STIM HORMONE: CPT | Performed by: STUDENT IN AN ORGANIZED HEALTH CARE EDUCATION/TRAINING PROGRAM

## 2024-12-13 PROCEDURE — 85025 COMPLETE CBC W/AUTO DIFF WBC: CPT | Performed by: INTERNAL MEDICINE

## 2024-12-13 PROCEDURE — 1111F DSCHRG MED/CURRENT MED MERGE: CPT | Performed by: INTERNAL MEDICINE

## 2024-12-13 PROCEDURE — 3077F SYST BP >= 140 MM HG: CPT | Performed by: INTERNAL MEDICINE

## 2024-12-13 PROCEDURE — G2211 COMPLEX E/M VISIT ADD ON: HCPCS | Performed by: INTERNAL MEDICINE

## 2024-12-13 PROCEDURE — 1160F RVW MEDS BY RX/DR IN RCRD: CPT | Performed by: INTERNAL MEDICINE

## 2024-12-13 PROCEDURE — 81003 URINALYSIS AUTO W/O SCOPE: CPT | Performed by: INTERNAL MEDICINE

## 2024-12-13 PROCEDURE — 80061 LIPID PANEL: CPT | Performed by: STUDENT IN AN ORGANIZED HEALTH CARE EDUCATION/TRAINING PROGRAM

## 2024-12-13 PROCEDURE — 99214 OFFICE O/P EST MOD 30 MIN: CPT | Performed by: INTERNAL MEDICINE

## 2024-12-13 PROCEDURE — 1159F MED LIST DOCD IN RCRD: CPT | Performed by: INTERNAL MEDICINE

## 2024-12-13 PROCEDURE — 1126F AMNT PAIN NOTED NONE PRSNT: CPT | Performed by: INTERNAL MEDICINE

## 2024-12-13 PROCEDURE — 84153 ASSAY OF PSA TOTAL: CPT | Performed by: STUDENT IN AN ORGANIZED HEALTH CARE EDUCATION/TRAINING PROGRAM

## 2024-12-13 PROCEDURE — 80053 COMPREHEN METABOLIC PANEL: CPT | Performed by: STUDENT IN AN ORGANIZED HEALTH CARE EDUCATION/TRAINING PROGRAM

## 2024-12-13 PROCEDURE — 3079F DIAST BP 80-89 MM HG: CPT | Performed by: INTERNAL MEDICINE

## 2024-12-13 NOTE — PROGRESS NOTES
Subjective       James Gary Meese is a 74 y.o. male.     Chief Complaint   Patient presents with    Hypertension     6 month follow up    Coronary Artery Calcification       History obtained from the patient.      History of Present Illness     The patient was diagnosed with Prostate Cancer in May 2022.  He had a Robotic Assisted Laparoscopic Prostatectomy on 8/26/2022.  He last saw Dr. Rudolph on 9/17/2024.  PSA was < 0.014.  He has had pelvic floor PT due to urinary incontinence, which is stable.  He was given samples of Vibegon, but has not taken them.  He states he controls his symptoms with drinking extra water and less caffeine.  He denies dysuria, hematuria, nocturia, daytime urinary frequency, urinary urgency, and problems with the urinary stream.       He has mild DAGMAR.  He states he has been off CPAP (his machine is very old).  He denies snoring and apnea.     He has mild Osteoarthritis, stable on no medication.     Cardiac Follow-Up: The patient is here today for a follow-up visit.      His Hypertension has been stable   Medication: Toprol-XL  Side Effects: None.     Comorbid Illness: Coronary Artery Calcification, on Aspirin and Atorvastatin.     Procedures: On 4/18/2023, Coronary Artery Calcium Score was 1013.4.  He has not had a cardiac stress test.     Interval Events:   On 6/14/2024, LDL was 79 and TG 74.  On 5/1/2023, Dr. Finn increased his Atorvastatin to 40 mg daily, but the patient states he remained on 10 mg daily due to side effects on the higher dose.  He last saw Dr. Finn on 5/1/2024.  There were no medication changes made.  He occasionally checks his blood pressure at home (130's/70's..        Symptoms:  Denies chest pain, shortness of breath, GAVIN, orthopnea, PND, lower extremity edema, claudication, lightheadedness, and dizziness.    Associated Symptoms: Weight decreased 4 pounds intentionally in the past 6 months.  Denies fatigue, headache, polyuria, polydipsia, myalgias, arthralgias,  visual impairment, memory loss, or concentration issues.     Lifestyle: He consumes a diverse and healthy diet.  He walks 2-3 miles daily.  He also does light calisthenics and push-ups.  Tobacco Use: Never a smoker.        Current Outpatient Medications on File Prior to Visit   Medication Sig Dispense Refill    aspirin 81 MG EC tablet Take 1 tablet by mouth Daily.      atorvastatin (LIPITOR) 10 MG tablet Take 1 tablet by mouth Every Night. 90 tablet 3    metoprolol succinate XL (TOPROL-XL) 50 MG 24 hr tablet Take 1 tablet by mouth Every Night. 90 tablet 3    Multiple Vitamins-Minerals (OCUVITE ADULT FORMULA PO) Take 1 capsule by mouth Daily.      [DISCONTINUED] Vibegron 75 MG tablet Take 1 tablet by mouth Daily. (Patient not taking: Reported on 12/13/2024) 28 tablet 0     No current facility-administered medications on file prior to visit.       Current outpatient and discharge medications have been reconciled for the patient.  Reviewed by: Breana Harden MD        The following portions of the patient's history were reviewed and updated as appropriate: allergies, current medications, past family history, past medical history, past social history, past surgical history, and problem list.    Review of Systems   Constitutional:  Negative for fatigue and unexpected weight change.   Eyes:  Negative for visual disturbance.   Respiratory:  Negative for cough, shortness of breath and wheezing.    Cardiovascular:  Negative for chest pain, palpitations and leg swelling.        No GAVIN, orthopnea, or claudication.   Gastrointestinal:  Negative for abdominal pain, blood in stool, constipation, diarrhea, nausea and vomiting.        Denies melena.   Endocrine: Negative for polydipsia, polyphagia and polyuria.   Musculoskeletal:  Negative for arthralgias and myalgias.   Neurological:  Negative for dizziness, syncope, light-headedness and headaches.        No memory issues.   Psychiatric/Behavioral:  Negative for decreased  concentration.          Objective       Blood pressure 140/80, pulse 64, temperature 97.8 °F (36.6 °C), temperature source Temporal, resp. rate 18, weight 74.2 kg (163 lb 9.6 oz).  Body mass index is 24.88 kg/m².      Physical Exam  Vitals and nursing note reviewed.   Constitutional:       Appearance: He is well-developed and normal weight.   Neck:      Thyroid: No thyroid mass or thyromegaly.      Vascular: No carotid bruit.   Cardiovascular:      Rate and Rhythm: Normal rate and regular rhythm.      Pulses: Normal pulses.      Heart sounds: Normal heart sounds. No murmur heard.  Pulmonary:      Effort: Pulmonary effort is normal.      Breath sounds: Normal breath sounds.   Musculoskeletal:      Right lower leg: No edema.      Left lower leg: No edema.   Neurological:      Mental Status: He is alert.   Psychiatric:         Mood and Affect: Mood normal.       Results for orders placed or performed in visit on 12/13/24   POC Urinalysis Dipstick, Automated    Collection Time: 12/13/24 11:28 AM    Specimen: Urine   Result Value Ref Range    Color Yellow Yellow, Straw, Dark Yellow, Zoila    Clarity, UA Clear Clear    Specific Gravity  1.020 1.005 - 1.030    pH, Urine 5.0 5.0 - 8.0    Leukocytes Negative Negative    Nitrite, UA Negative Negative    Protein, POC Negative Negative mg/dL    Glucose, UA Negative Negative mg/dL    Ketones, UA Negative Negative    Urobilinogen, UA Normal Normal, 0.2 E.U./dL    Bilirubin Negative Negative    Blood, UA 50 Asad/ul (A) Negative    Lot Number [id number]     Expiration Date 11.23.2025        Assessment / Plan:  Diagnoses and all orders for this visit:    1. Primary hypertension (Primary)  -     POC Urinalysis Dipstick, Automated  -     Lipid Panel  -     Comprehensive Metabolic Panel  -     TSH  -     CBC & Differential   Continue current medication(s) as noted in the history of present illness.    2. Elevated coronary artery calcium score  -     Lipid Panel  -     Comprehensive  Metabolic Panel  -     TSH  -     CBC & Differential   Continue current medication(s) as noted in the history of present illness.    3. Primary osteoarthritis, unspecified site   Stable, no medication.    4. Obstructive sleep apnea syndrome   Stable, no CPAP.    5. Prostate cancer  -     PSA Diagnostic      I recommended the Influenza and the COVID-19 bivalent vaccine, at the pharmacy or in our office. The patient declined.  The patient was informed he could be hospitalized and die from COVID-19 infection.      I recommended the Hepatitis A Vaccine and the Shingrix vaccine at the pharmacy, patient declines.      BMI is within normal parameters. No other follow-up for BMI required.        Return in about 6 months (around 6/13/2025) for schedule Subseq Medicare Wellness Exam and Recheck, fasting (same appt).

## 2025-04-14 RX ORDER — ATORVASTATIN CALCIUM 10 MG/1
10 TABLET, FILM COATED ORAL NIGHTLY
Qty: 90 TABLET | Refills: 0 | Status: SHIPPED | OUTPATIENT
Start: 2025-04-14

## 2025-05-28 ENCOUNTER — OFFICE VISIT (OUTPATIENT)
Dept: CARDIOLOGY | Facility: CLINIC | Age: 75
End: 2025-05-28
Payer: MEDICARE

## 2025-05-28 VITALS
BODY MASS INDEX: 25.48 KG/M2 | OXYGEN SATURATION: 98 % | HEIGHT: 69 IN | SYSTOLIC BLOOD PRESSURE: 130 MMHG | DIASTOLIC BLOOD PRESSURE: 82 MMHG | HEART RATE: 59 BPM | WEIGHT: 172 LBS

## 2025-05-28 DIAGNOSIS — E78.2 MIXED HYPERLIPIDEMIA: ICD-10-CM

## 2025-05-28 DIAGNOSIS — I10 PRIMARY HYPERTENSION: ICD-10-CM

## 2025-05-28 DIAGNOSIS — R93.1 ELEVATED CORONARY ARTERY CALCIUM SCORE: Primary | ICD-10-CM

## 2025-05-28 RX ORDER — ATORVASTATIN CALCIUM 10 MG/1
10 TABLET, FILM COATED ORAL NIGHTLY
Qty: 90 TABLET | Refills: 3 | Status: SHIPPED | OUTPATIENT
Start: 2025-05-28

## 2025-05-28 NOTE — PROGRESS NOTES
[facility] CARDIOLOGY    Established Patient Office Visit    Patient Name: James Gary Meese  : 1950   MRN: [id number]   Care Team: Patient Care Team:  Breana Harden MD as PCP - General  Breana Harden MD as PCP - Family Medicine  Mushtaq Rudolph MD as Consulting Physician (Urology)  Geovanni Finn MD as Cardiologist (Cardiology)    Chief Complaint   Patient presents with    Elevated coronary artery calcium score     HPI: James Gary Meese is a 74 y.o. male with a history of hypertension, hyperlipidemia, prostate cancer with prostatectomy in Aug 2022 who presents today for routine follow up for elevated coronary artery calcium score.     Since his last visit in May 2024, he has not had any new dyspnea on exertion or chest pain with activity. 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     He did have illness for a few weeks earlier this year which he attributed mainly to seasonal allergies.  He does not have any interval development of chest pain or dyspnea.  He has been trying to increase his physical activity and walk 5-10,000 steps per day but admits it tends to be closer to 5.  He does say that some of his time for physical activity is limited as he does spend time caring for his wife who has chronic illness.    Subjective   Review of Systems   Constitutional:  Negative for activity change.   Respiratory:  Negative for chest tightness and shortness of breath.    Cardiovascular:  Negative for chest pain, palpitations and leg swelling.   Genitourinary:  Positive for urinary incontinence.     Past Medical History:   Diagnosis Date    Basal cell carcinoma (BCC) of skin of nose     Dx - s/p Mohs Surgery    Benign prostatic hyperplasia     Elevated coronary artery calcium score     Dx 23- Coronary Calcium Score of 1013.4.    Elevated PSA     Erectile dysfunction     History of varicella     Hypertension     Dx     Melanoma in situ of torso  excluding breast     Dx 7/20    Obstructive sleep apnea syndrome     Description: dx 9/10-mild, noncompliant with CPAP    Osteoarthritis     Description: mild    Prostate cancer     Dx 5/2022- Klaudia    SCC (squamous cell carcinoma), face     Dx 7/20- right sideburn     Simple renal cyst     Dx 10/7/15 by u/s (bilateral)    Urinary incontinence      Past Surgical History:   Procedure Laterality Date    APPENDECTOMY  1963    INGUINAL HERNIA REPAIR Right 07/2013    with mesh    MOHS SURGERY  01/20/2020    nasal BCC    PROSTATECTOMY N/A 08/26/2022    Procedure: ROBOTIC ASSISTED LAPAROSCOPIC PROSTATECTOMY, RIGHT NERVE SPARING, LEFT WIDE DISSECTION, BILATERAL PELVIC LYMPH NODE DISSECTION;  Surgeon: Mushtaq Rudolph MD;  Location: [facility];  Service: Robotics - DaVinci;  Laterality: N/A;    SKIN BIOPSY       Social History     Socioeconomic History    Marital status:     Number of children: 1   Tobacco Use    Smoking status: Never     Passive exposure: Never    Smokeless tobacco: Never   Vaping Use    Vaping status: Never Used   Substance and Sexual Activity    Alcohol use: Not Currently     Comment: Weekly    Drug use: Never    Sexual activity: Not Currently     Partners: Female       Current Outpatient Medications:     aspirin 81 MG EC tablet, Take 1 tablet by mouth Daily., Disp: , Rfl:     atorvastatin (LIPITOR) 10 MG tablet, TAKE 1 TABLET BY MOUTH ONCE DAILY AT NIGHT, Disp: 90 tablet, Rfl: 0    metoprolol succinate XL (TOPROL-XL) 50 MG 24 hr tablet, Take 1 tablet by mouth Every Night., Disp: 90 tablet, Rfl: 3    Multiple Vitamins-Minerals (OCUVITE ADULT FORMULA PO), Take 1 capsule by mouth Daily., Disp: , Rfl:      Allergies   Allergen Reactions    Hydrochlorothiazide Other (See Comments)     Increased incontinence      Objective     Vitals:    05/28/25 0853   BP: 130/82   BP Location: Right arm   Patient Position: Sitting   Cuff Size: Adult   Pulse: 59   SpO2: 98%   Weight: 78 kg (172 lb)   Height:  "175.3 cm (69\")     Body mass index is 25.4 kg/m².  Gen: well developed, sitting up on exam table, comfortable appearing  HEENT: MMM, sclera anicteric, conjunctiva normal, no carotid bruits  CV: regular rate, regular rhythm, no murmurs or rubs, normal S1, S2. 2+ radial and PT pulses  Pulm: RA, normal work of breathing, no wheezes, rales, rhonchi  Ext: normal bulk for age, normal tone, no dependent edema  Neuro: alert, oriented, face symmetrical, moving all extremities well  Psych: normal mood, appropriate affect    Most recent PCP note, imaging tests, and labs reviewed.    4/18/23 - CT CAC Scoring  Agatston scores for individual coronary arteries are as follows:  Left main (LM): 0  Left anterior descending (LAD): 132.1  Left circumflex (CX): 141.8  Right coronary artery (RCA): 739.5  Total 1013.4    Labs:  Lab Results   Component Value Date    WBC 6.50 12/13/2024    HGB 17.7 12/13/2024    HCT 50.3 12/13/2024    MCV 89.5 12/13/2024     12/13/2024     Lab Results   Component Value Date    GLUCOSE 82 12/13/2024    BUN 14 12/13/2024    CREATININE 1.09 12/13/2024    EGFRIFNONA 75 03/14/2017    BCR 12.8 12/13/2024    K 4.4 12/13/2024    CO2 27.2 12/13/2024    CALCIUM 9.6 12/13/2024    ALBUMIN 4.4 12/13/2024    AST 26 12/13/2024    ALT 26 12/13/2024     Lab Results   Component Value Date    HGBA1C 5.50 08/24/2022     Lab Results   Component Value Date    CHOL 129 12/13/2024    TRIG 72 12/13/2024    HDL 37 (L) 12/13/2024    LDL 77 12/13/2024         ECG 12 Lead    Date/Time: 5/28/2025 9:08 AM  Performed by: Geovanni Finn MD    Authorized by: Geovanni Finn MD  Comparison: compared with previous ECG from 8/24/2022  Similar to previous ECG  Comparison to previous ECG: Very small R waves in V1, V2  Rhythm: sinus bradycardia  Rate: bradycardic  BPM: 59  QRS axis: normal    Clinical impression: non-specific ECG  Comments: Stable small R waves V1-V2           Advance Care Planning   ACP discussion was declined by the " patient. Patient has an advance directive in EMR which is still valid.        Assessment & Plan       ICD-10-CM ICD-9-CM   1. Elevated coronary artery calcium score  R93.1 414.00   2. Primary hypertension  I10 401.9   3. Mixed hyperlipidemia  E78.2 272.2       Preclinical ASCVD  Coronary calcium score approximately 90th percentile for age   - continue 81 mg daily, atorvastatin 10 mg daily   - Continue following blood pressure    Hypertension   - Acceptable control today, continue to follow    Hyperlipidemia   - Atorvastatin, had SE with 40mg daily, tolerates 10mg with good LDL response   - Last LDL 77, improved from 146 prior to statin initiation    - continue healthy dietary habits, increase physical activity    Return in about 1 year (around 5/28/2026).    SAMRA Finn MD  05/28/25    [facility] Cardiology  [address] [address] [phone]

## 2025-06-21 DIAGNOSIS — I10 PRIMARY HYPERTENSION: ICD-10-CM

## 2025-06-23 RX ORDER — METOPROLOL SUCCINATE 50 MG/1
50 TABLET, EXTENDED RELEASE ORAL NIGHTLY
Qty: 90 TABLET | Refills: 3 | Status: SHIPPED | OUTPATIENT
Start: 2025-06-23

## 2025-06-24 ENCOUNTER — OFFICE VISIT (OUTPATIENT)
Dept: INTERNAL MEDICINE | Facility: CLINIC | Age: 75
End: 2025-06-24
Payer: MEDICARE

## 2025-06-24 VITALS
HEART RATE: 60 BPM | TEMPERATURE: 98.2 F | SYSTOLIC BLOOD PRESSURE: 158 MMHG | RESPIRATION RATE: 16 BRPM | WEIGHT: 168.4 LBS | BODY MASS INDEX: 25.52 KG/M2 | DIASTOLIC BLOOD PRESSURE: 100 MMHG | HEIGHT: 68 IN

## 2025-06-24 DIAGNOSIS — I10 PRIMARY HYPERTENSION: ICD-10-CM

## 2025-06-24 DIAGNOSIS — C61 PROSTATE CANCER: ICD-10-CM

## 2025-06-24 DIAGNOSIS — D03.59 MELANOMA IN SITU OF TORSO EXCLUDING BREAST: ICD-10-CM

## 2025-06-24 DIAGNOSIS — R93.1 ELEVATED CORONARY ARTERY CALCIUM SCORE: ICD-10-CM

## 2025-06-24 DIAGNOSIS — Z00.00 MEDICARE ANNUAL WELLNESS VISIT, SUBSEQUENT: Primary | ICD-10-CM

## 2025-06-24 DIAGNOSIS — M19.91 PRIMARY OSTEOARTHRITIS, UNSPECIFIED SITE: ICD-10-CM

## 2025-06-24 DIAGNOSIS — G47.33 OBSTRUCTIVE SLEEP APNEA SYNDROME: ICD-10-CM

## 2025-06-24 LAB
ALBUMIN SERPL-MCNC: 4.2 G/DL (ref 3.5–5.2)
ALBUMIN/GLOB SERPL: 1.7 G/DL
ALP SERPL-CCNC: 80 U/L (ref 39–117)
ALT SERPL W P-5'-P-CCNC: 19 U/L (ref 1–41)
ANION GAP SERPL CALCULATED.3IONS-SCNC: 12 MMOL/L (ref 5–15)
AST SERPL-CCNC: 25 U/L (ref 1–40)
BASOPHILS # BLD AUTO: 0.04 10*3/MM3 (ref 0–0.2)
BASOPHILS NFR BLD AUTO: 0.7 % (ref 0–1.5)
BILIRUB SERPL-MCNC: 0.8 MG/DL (ref 0–1.2)
BUN SERPL-MCNC: 16 MG/DL (ref 8–23)
BUN/CREAT SERPL: 15.7 (ref 7–25)
CALCIUM SPEC-SCNC: 9.2 MG/DL (ref 8.6–10.5)
CHLORIDE SERPL-SCNC: 105 MMOL/L (ref 98–107)
CHOLEST SERPL-MCNC: 128 MG/DL (ref 0–200)
CO2 SERPL-SCNC: 25 MMOL/L (ref 22–29)
CREAT SERPL-MCNC: 1.02 MG/DL (ref 0.76–1.27)
DEPRECATED RDW RBC AUTO: 38.5 FL (ref 37–54)
EGFRCR SERPLBLD CKD-EPI 2021: 78.1 ML/MIN/1.73
EOSINOPHIL # BLD AUTO: 0.19 10*3/MM3 (ref 0–0.4)
EOSINOPHIL NFR BLD AUTO: 3.4 % (ref 0.3–6.2)
ERYTHROCYTE [DISTWIDTH] IN BLOOD BY AUTOMATED COUNT: 12 % (ref 12.3–15.4)
GLOBULIN UR ELPH-MCNC: 2.5 GM/DL
GLUCOSE SERPL-MCNC: 92 MG/DL (ref 65–99)
HCT VFR BLD AUTO: 47.9 % (ref 37.5–51)
HDLC SERPL-MCNC: 34 MG/DL (ref 40–60)
HGB BLD-MCNC: 16.2 G/DL (ref 13–17.7)
IMM GRANULOCYTES # BLD AUTO: 0.05 10*3/MM3 (ref 0–0.05)
IMM GRANULOCYTES NFR BLD AUTO: 0.9 % (ref 0–0.5)
LDLC SERPL CALC-MCNC: 81 MG/DL (ref 0–100)
LDLC/HDLC SERPL: 2.39 {RATIO}
LYMPHOCYTES # BLD AUTO: 0.92 10*3/MM3 (ref 0.7–3.1)
LYMPHOCYTES NFR BLD AUTO: 16.6 % (ref 19.6–45.3)
MCH RBC QN AUTO: 30.9 PG (ref 26.6–33)
MCHC RBC AUTO-ENTMCNC: 33.8 G/DL (ref 31.5–35.7)
MCV RBC AUTO: 91.4 FL (ref 79–97)
MONOCYTES # BLD AUTO: 0.47 10*3/MM3 (ref 0.1–0.9)
MONOCYTES NFR BLD AUTO: 8.5 % (ref 5–12)
NEUTROPHILS NFR BLD AUTO: 3.88 10*3/MM3 (ref 1.7–7)
NEUTROPHILS NFR BLD AUTO: 69.9 % (ref 42.7–76)
NRBC BLD AUTO-RTO: 0 /100 WBC (ref 0–0.2)
PLATELET # BLD AUTO: 268 10*3/MM3 (ref 140–450)
PMV BLD AUTO: 9.5 FL (ref 6–12)
POTASSIUM SERPL-SCNC: 4.6 MMOL/L (ref 3.5–5.2)
PROT SERPL-MCNC: 6.7 G/DL (ref 6–8.5)
RBC # BLD AUTO: 5.24 10*6/MM3 (ref 4.14–5.8)
SODIUM SERPL-SCNC: 142 MMOL/L (ref 136–145)
TRIGL SERPL-MCNC: 64 MG/DL (ref 0–150)
VLDLC SERPL-MCNC: 13 MG/DL (ref 5–40)
WBC NRBC COR # BLD AUTO: 5.55 10*3/MM3 (ref 3.4–10.8)

## 2025-06-24 PROCEDURE — 80053 COMPREHEN METABOLIC PANEL: CPT | Performed by: INTERNAL MEDICINE

## 2025-06-24 PROCEDURE — 80061 LIPID PANEL: CPT | Performed by: INTERNAL MEDICINE

## 2025-06-24 PROCEDURE — 85025 COMPLETE CBC W/AUTO DIFF WBC: CPT | Performed by: INTERNAL MEDICINE

## 2025-06-24 NOTE — PROGRESS NOTES
Subjective       James Gary Meese is a 74 y.o. male.     Chief Complaint   Patient presents with    Medicare Wellness-subsequent    Hypertension     6-month follow-up    Osteoarthritis       History obtained from the patient.      History of Present Illness     The patient was diagnosed with Prostate Cancer in May 2022.  He had a Robotic Assisted Laparoscopic Prostatectomy on 8/26/2022.  He last saw Dr. Rudolph on 9/17/2024.  PSA was < 0.014.  He has had pelvic floor PT due to urinary incontinence, which is stable.  He was given samples of Vibegon, which did not help.   He denies dysuria, hematuria, nocturia, daytime urinary frequency, urinary urgency, and problems with the urinary stream.  On 12/13/2024, PSA was < 0.014.  He is to follow-up in 1 year.     He has mild DAGMAR.  He states he has been off CPAP (his machine is very old).  He denies snoring and apnea.     He has mild Osteoarthritis, stable on no medication.  Denies myalgias, arthralgias, joint swelling, and joint stiffness.  No back or neck pain.  He does see a chiropractor every 6 weeks for back stiffness.    He sees Dermatology every 6 months for Melanoma.     Cardiac Follow-Up: The patient is here today for a follow-up visit.      His Hypertension has been stable   Medication: Toprol-XL  Side Effects: None.     Comorbid Illness: Coronary Artery Calcification, on Aspirin and Atorvastatin.     Procedures: On 4/18/2023, Coronary Artery Calcium Score was 1013.4.  He has not had a cardiac stress test.     Interval Events:   On 12/13/2024, LDL was 77 and TG 22.  He last saw Cardiology, Dr. Finn on 5/1/2024.  There were no medication changes made.  He is to follow-up in 1 year.  He occasionally checks his blood pressure at home (130's/70's).   He states he had a dental implant on 6/19/2025.  His initial blood pressure was 148/87, but repeat was 128/78.     Symptoms:  Denies chest pain, shortness of breath, GAVIN, orthopnea, PND, palpitations, syncope, lower  extremity edema, claudication, lightheadedness, and dizziness.    Associated Symptoms: Weight increased 5 pounds intentionally in the past 6 months.  Denies fatigue, headache, polyuria, polydipsia, myalgias, arthralgias, visual impairment, memory loss, or concentration issues.     Lifestyle: He consumes a diverse and healthy diet.  He walks 2-3 miles daily.  He also does yard work  Tobacco Use: Never a smoker.       Current Outpatient Medications on File Prior to Visit   Medication Sig Dispense Refill    aspirin 81 MG EC tablet Take 1 tablet by mouth Daily.      atorvastatin (LIPITOR) 10 MG tablet Take 1 tablet by mouth Every Night. 90 tablet 3    metoprolol succinate XL (TOPROL-XL) 50 MG 24 hr tablet TAKE 1 TABLET BY MOUTH ONCE DAILY AT NIGHT 90 tablet 3    Multiple Vitamins-Minerals (OCUVITE ADULT FORMULA PO) Take 1 capsule by mouth Daily.       No current facility-administered medications on file prior to visit.       Current outpatient and discharge medications have been reconciled for the patient.  Reviewed by: Breana Harden MD        The following portions of the patient's history were reviewed and updated as appropriate: allergies, current medications, past family history, past medical history, past social history, past surgical history, and problem list.    Review of Systems   Constitutional:  Positive for unexpected weight change. Negative for fatigue.   Eyes:  Negative for visual disturbance.   Respiratory:  Negative for cough, shortness of breath and wheezing.    Cardiovascular:  Negative for chest pain, palpitations and leg swelling.        No GAVIN, orthopnea, or claudication.   Gastrointestinal:  Negative for abdominal pain, blood in stool, constipation, diarrhea, nausea and vomiting.        Denies melena.   Endocrine: Negative for polydipsia, polyphagia and polyuria.   Musculoskeletal:  Negative for arthralgias and myalgias.   Neurological:  Negative for dizziness, syncope, light-headedness and  "headaches.        No memory issues.   Psychiatric/Behavioral:  Negative for decreased concentration.          Objective       Blood pressure 158/100, pulse 60, temperature 98.2 °F (36.8 °C), temperature source Infrared, resp. rate 16, height 171.5 cm (67.5\"), weight 76.4 kg (168 lb 6.4 oz).  Body mass index is 25.99 kg/m².      Physical Exam  Vitals and nursing note reviewed.   Constitutional:       Appearance: He is well-developed.      Comments: BMI greater than 25   Neck:      Thyroid: No thyroid mass or thyromegaly.      Vascular: No carotid bruit.   Cardiovascular:      Rate and Rhythm: Normal rate and regular rhythm.      Pulses: Normal pulses.      Heart sounds: Normal heart sounds. No murmur heard.     No friction rub. No gallop.   Pulmonary:      Effort: Pulmonary effort is normal.      Breath sounds: Normal breath sounds.   Abdominal:      General: Bowel sounds are normal. There is no distension or abdominal bruit.      Palpations: Abdomen is soft. There is no hepatomegaly, splenomegaly or mass.      Tenderness: There is no abdominal tenderness.   Musculoskeletal:      Cervical back: Normal range of motion and neck supple.      Right lower leg: No edema.      Left lower leg: No edema.   Neurological:      Mental Status: He is alert.   Psychiatric:         Mood and Affect: Mood normal.         Assessment / Plan:  Diagnoses and all orders for this visit:    1. Medicare annual wellness visit, subsequent (Primary)    2. Primary hypertension (blood pressure mildly elevated today but overall stable)  -     CBC & Differential  -     Lipid Panel  -     Comprehensive Metabolic Panel   Continue current medication(s) as noted in the history of present illness.   Monitor closely.    3. Primary osteoarthritis, unspecified site   Stable, no medication.    4. Elevated coronary artery calcium score  -     CBC & Differential  -     Lipid Panel  -     Comprehensive Metabolic Panel   Continue current medication(s) as noted " in the history of present illness.    5. Prostate cancer   Follow-up per Urology.    6. Obstructive sleep apnea syndrome   Stable, no CPAP.    7. Melanoma in situ of torso excluding breast   Follow-up with Dermatology.      I recommended Shingrix (Shingles vaccine) at the pharmacy.    I recommended the Influenza and Covid 19 vaccine, in our office or at the pharmacy, Fall 2025.      BMI is >= 25 and <30. (Overweight) The following options were offered after discussion;: exercise counseling/recommendations and nutrition counseling/recommendations        Return in about 6 months (around 12/24/2025) for Recheck HTN, fasting.

## 2025-06-24 NOTE — PROGRESS NOTES
Subjective   The ABCs of the Annual Wellness Visit  Medicare Wellness Visit      James Gary Meese is a 74 y.o. patient who presents for a Medicare Wellness Visit.    The following portions of the patient's history were reviewed and   updated as appropriate: allergies, current medications, past family history, past medical history, past social history, past surgical history, and problem list.    Compared to one year ago, the patient's physical   health is the same.  Compared to one year ago, the patient's mental   health is the same.    Recent Hospitalizations:  He was not admitted to the hospital during the last year.     Current Medical Providers:  Patient Care Team:  Breana Harden MD as PCP - General  Breana Harden MD as PCP - Family Medicine  Mushtaq Rudolph MD as Consulting Physician (Urology)  Geovanni Finn MD as Cardiologist (Cardiology)    Outpatient Medications Prior to Visit   Medication Sig Dispense Refill    aspirin 81 MG EC tablet Take 1 tablet by mouth Daily.      atorvastatin (LIPITOR) 10 MG tablet Take 1 tablet by mouth Every Night. 90 tablet 3    metoprolol succinate XL (TOPROL-XL) 50 MG 24 hr tablet TAKE 1 TABLET BY MOUTH ONCE DAILY AT NIGHT 90 tablet 3    Multiple Vitamins-Minerals (OCUVITE ADULT FORMULA PO) Take 1 capsule by mouth Daily.       No facility-administered medications prior to visit.     No opioid medication identified on active medication list. I have reviewed chart for other potential  high risk medication/s and harmful drug interactions in the elderly.      Aspirin is on active medication list. Aspirin use is indicated based on review of current medical condition/s. Pros and cons of this therapy have been discussed today. Benefits of this medication outweigh potential harm.  Patient has been encouraged to continue taking this medication.  .      Patient Active Problem List   Diagnosis    Simple renal cyst    Hypertension    Obstructive sleep apnea syndrome     "Osteoarthritis    Basal cell carcinoma (BCC) of skin of nose    SCC (squamous cell carcinoma), face    Melanoma in situ of torso excluding breast    Prostate cancer    Elevated coronary artery calcium score     Advance Care Planning Advance Directive is on file.  ACP discussion was held with the patient during this visit. Patient has an advance directive in EMR which is still valid.      ACP information on the AVS declined by the patient.            Objective   Vitals:    06/24/25 0851   BP: 158/100   BP Location: Right arm   Patient Position: Sitting   Cuff Size: Adult   Pulse: 60   Resp: 16   Temp: 98.2 °F (36.8 °C)   TempSrc: Infrared   Weight: 76.4 kg (168 lb 6.4 oz)   Height: 171.5 cm (67.5\")   PainSc: 0-No pain       Estimated body mass index is 25.99 kg/m² as calculated from the following:    Height as of this encounter: 171.5 cm (67.5\").    Weight as of this encounter: 76.4 kg (168 lb 6.4 oz).    BMI is >= 25 and <30. (Overweight) The following options were offered after discussion;: exercise counseling/recommendations and nutrition counseling/recommendations           Does the patient have evidence of cognitive impairment? No    Finger Rub Hearing Test (right ear):passed  Finger Rub Hearing Test (left ear):passed                                                                                              Health  Risk Assessment    Smoking Status:  Social History     Tobacco Use   Smoking Status Never    Passive exposure: Never   Smokeless Tobacco Never     Alcohol Consumption:  Social History     Substance and Sexual Activity   Alcohol Use Not Currently    Comment: Weekly       Fall Risk Screen  STEADI Fall Risk Assessment was completed, and patient is at LOW risk for falls.Assessment completed on:6/24/2025    Depression Screening   Little interest or pleasure in doing things? Not at all   Feeling down, depressed, or hopeless? Not at all   PHQ-2 Total Score 0      Health Habits and Functional and Cognitive " Screenin/24/2025     8:52 AM   Functional & Cognitive Status   Do you have difficulty preparing food and eating? No   Do you have difficulty bathing yourself, getting dressed or grooming yourself? No   Do you have difficulty using the toilet? No   Do you have difficulty moving around from place to place? No   Do you have trouble with steps or getting out of a bed or a chair? No   Current Diet Well Balanced Diet   Dental Exam Up to date   Eye Exam Up to date   Exercise (times per week) 5 times per week   Current Exercises Include Walking;Yard Work;House Cleaning   Do you need help using the phone?  No   Are you deaf or do you have serious difficulty hearing?  No   Do you need help to go to places out of walking distance? No   Do you need help shopping? No   Do you need help preparing meals?  No   Do you need help with housework?  No   Do you need help with laundry? No   Do you need help taking your medications? No   Do you need help managing money? No   Do you ever drive or ride in a car without wearing a seat belt? No   Have you felt unusual fatigue (could be tiredness), stress, anger or loneliness in the last month? No   Who do you live with? Spouse   If you need help, do you have trouble finding someone available to you? No   Have you been bothered in the last four weeks by sexual problems? No   Do you have difficulty concentrating, remembering or making decisions? No           Age-appropriate Screening Schedule:  Refer to the list below for future screening recommendations based on patient's age, sex and/or medical conditions. Orders for these recommended tests are listed in the plan section. The patient has been provided with a written plan.    Health Maintenance List  Health Maintenance   Topic Date Due    COVID-19 Vaccine (3 - 2024-25 season) 2024    INFLUENZA VACCINE  2025    COLORECTAL CANCER SCREENING  10/31/2025    ZOSTER VACCINE (1 of 2) 2025 (Originally 2002)    PROSTATE  CANCER SCREENING  12/13/2025    ANNUAL WELLNESS VISIT  06/24/2026    LIPID PANEL  06/24/2026    TDAP/TD VACCINES (3 - Td or Tdap) 03/14/2027    HEPATITIS C SCREENING  Completed    Pneumococcal Vaccine 50+  Completed                                                                                                                                                CMS Preventative Services Quick Reference  Risk Factors Identified During Encounter  Immunizations Discussed/Encouraged: Influenza, Shingrix, and COVID19  Urinary Incontinence: Follow-up per Urology.  Dental Screening Recommended  Vision Screening Recommended    The above risks/problems have been discussed with the patient.  Pertinent information has been shared with the patient in the After Visit Summary.  An After Visit Summary and PPPS were made available to the patient.    Follow Up:   Next Medicare Wellness visit to be scheduled in 1 year.     Assessment & Plan  Medicare annual wellness visit, subsequent         Primary hypertension      Orders:    CBC & Differential; Future    Lipid Panel; Future    Comprehensive Metabolic Panel; Future    Primary osteoarthritis, unspecified site         Elevated coronary artery calcium score    Orders:    CBC & Differential; Future    Lipid Panel; Future    Comprehensive Metabolic Panel; Future    Prostate cancer         Obstructive sleep apnea syndrome         Melanoma in situ of torso excluding breast              Follow Up:   Return in about 6 months (around 12/24/2025) for Recheck HTN, fasting.

## 2025-06-24 NOTE — PATIENT INSTRUCTIONS
I recommend Shingrix (Shingles vaccine) at the pharmacy.    I recommend the Influenza and Covid 19 vaccine, in our office or at the pharmacy, Fall 2025.    MyPlate from USDA    MyPlate is an outline of a general healthy diet based on the Dietary Guidelines for Americans, 1055-9596, from the U.S. Department of Agriculture (USDA). It sets guidelines for how much food you should eat from each food group based on your age, sex, and level of physical activity.  What are tips for following MyPlate?  To follow MyPlate recommendations:  Eat a wide variety of fruits and vegetables, grains, and protein foods.  Serve smaller portions and eat less food throughout the day.  Limit portion sizes to avoid overeating.  Enjoy your food.  Get at least 150 minutes of exercise every week. This is about 30 minutes each day, 5 or more days per week.  It can be difficult to have every meal look like MyPlate. Think about MyPlate as eating guidelines for an entire day, rather than each individual meal.  Fruits and vegetables  Make one half of your plate fruits and vegetables.  Eat many different colors of fruits and vegetables each day.  For a 2,000-calorie daily food plan, eat:  2½ cups of vegetables every day.  2 cups of fruit every day.  1 cup is equal to:  1 cup raw or cooked vegetables.  1 cup raw fruit.  1 medium-sized orange, apple, or banana.  1 cup 100% fruit or vegetable juice.  2 cups raw leafy greens, such as lettuce, spinach, or kale.  ½ cup dried fruit.  Grains  One fourth of your plate should be grains.  Make at least half of the grains you eat each day whole grains.  For a 2,000-calorie daily food plan, eat 6 oz of grains every day.  1 oz is equal to:  1 slice bread.  1 cup cereal.  ½ cup cooked rice, cereal, or pasta.  Protein  One fourth of your plate should be protein.  Eat a wide variety of protein foods, including meat, poultry, fish, eggs, beans, nuts, and tofu.  For a 2,000-calorie daily food plan, eat 5½ oz of protein  every day.  1 oz is equal to:  1 oz meat, poultry, or fish.  ¼ cup cooked beans.  1 egg.  ½ oz nuts or seeds.  1 Tbsp peanut butter.  Dairy  Drink fat-free or low-fat (1%) milk.  Eat or drink dairy as a side to meals.  For a 2,000-calorie daily food plan, eat or drink 3 cups of dairy every day.  1 cup is equal to:  1 cup milk, yogurt, cottage cheese, or soy milk (soy beverage).  2 oz processed cheese.  1½ oz natural cheese.  Fats, oils, salt, and sugars  Only small amounts of oils are recommended.  Avoid foods that are high in calories and low in nutritional value (empty calories), like foods high in fat or added sugars.  Choose foods that are low in salt (sodium). Choose foods that have less than 140 milligrams (mg) of sodium per serving.  Drink water instead of sugary drinks. Drink enough fluid to keep your urine pale yellow.  Where to find support  Work with your health care provider or a dietitian to develop a customized eating plan that is right for you.  Download an lynnette (mobile application) to help you track your daily food intake.  Where to find more information  USDA: ChooseMyPlate.gov  Summary  MyPlate is a general guideline for healthy eating from the USDA. It is based on the Dietary Guidelines for Americans, 0631-2143.  In general, fruits and vegetables should take up one half of your plate, grains should take up one fourth of your plate, and protein should take up one fourth of your plate.  This information is not intended to replace advice given to you by your health care provider. Make sure you discuss any questions you have with your health care provider.  Document Revised: 11/08/2021 Document Reviewed: 11/08/2021  [employer] Patient Education © 2024 [employer]    Exercising to Stay Healthy  To become healthy and stay healthy, it is recommended that you do moderate-intensity and vigorous-intensity exercise. You can tell that you are exercising at a moderate intensity if your heart starts beating  faster and you start breathing faster but can still hold a conversation. You can tell that you are exercising at a vigorous intensity if you are breathing much harder and faster and cannot hold a conversation while exercising.  How can exercise benefit me?  Exercising regularly is important. It has many health benefits, such as:  Improving overall fitness, flexibility, and endurance.  Increasing bone density.  Helping with weight control.  Decreasing body fat.  Increasing muscle strength and endurance.  Reducing stress and tension, anxiety, depression, or anger.  Improving overall health.  What guidelines should I follow while exercising?  Before you start a new exercise program, talk with your health care provider.  Do not exercise so much that you hurt yourself, feel dizzy, or get very short of breath.  Wear comfortable clothes and wear shoes with good support.  Drink plenty of water while you exercise to prevent dehydration or heat stroke.  Work out until your breathing and your heartbeat get faster (moderate intensity).  How often should I exercise?  Choose an activity that you enjoy, and set realistic goals. Your health care provider can help you make an activity plan that is individually designed and works best for you.  Exercise regularly as told by your health care provider. This may include:  Doing strength training two times a week, such as:  Lifting weights.  Using resistance bands.  Push-ups.  Sit-ups.  Yoga.  Doing a certain intensity of exercise for a given amount of time. Choose from these options:  A total of 150 minutes of moderate-intensity exercise every week.  A total of 75 minutes of vigorous-intensity exercise every week.  A mix of moderate-intensity and vigorous-intensity exercise every week.  Children, pregnant women, people who have not exercised regularly, people who are overweight, and older adults may need to talk with a health care provider about what activities are safe to perform. If  you have a medical condition, be sure to talk with your health care provider before you start a new exercise program.  What are some exercise ideas?  Moderate-intensity exercise ideas include:  Walking 1 mile (1.6 km) in about 15 minutes.  Biking.  Hiking.  Golfing.  Dancing.  Water aerobics.  Vigorous-intensity exercise ideas include:  Walking 4.5 miles (7.2 km) or more in about 1 hour.  Jogging or running 5 miles (8 km) in about 1 hour.  Biking 10 miles (16.1 km) or more in about 1 hour.  Lap swimming.  Roller-skating or in-line skating.  Cross-country skiing.  Vigorous competitive sports, such as football, basketball, and soccer.  Jumping rope.  Aerobic dancing.  What are some everyday activities that can help me get exercise?  Yard work, such as:  Pushing a .  Raking and bagging leaves.  Washing your car.  Pushing a stroller.  Shoveling snow.  Gardening.  Washing windows or floors.  How can I be more active in my day-to-day activities?  Use stairs instead of an elevator.  Take a walk during your lunch break.  If you drive, park your car farther away from your work or school.  If you take public transportation, get off one stop early and walk the rest of the way.  Stand up or walk around during all of your indoor phone calls.  Get up, stretch, and walk around every 30 minutes throughout the day.  Enjoy exercise with a friend. Support to continue exercising will help you keep a regular routine of activity.  Where to find more information  You can find more information about exercising to stay healthy from:  U.[place]. Department of Health and Human Services: www.hhs.gov  Centers for Disease Control and Prevention (CDC): www.cdc.gov  Summary  Exercising regularly is important. It will improve your overall fitness, flexibility, and endurance.  Regular exercise will also improve your overall health. It can help you control your weight, reduce stress, and improve your bone density.  Do not exercise so much that  you hurt yourself, feel dizzy, or get very short of breath.  Before you start a new exercise program, talk with your health care provider.  This information is not intended to replace advice given to you by your health care provider. Make sure you discuss any questions you have with your health care provider.  Document Revised: 04/15/2022 Document Reviewed: 04/15/2022  [employer] Patient Education © 2024 [employer]

## 2025-07-05 ENCOUNTER — RESULTS FOLLOW-UP (OUTPATIENT)
Dept: INTERNAL MEDICINE | Facility: CLINIC | Age: 75
End: 2025-07-05
Payer: MEDICARE

## 2025-07-05 NOTE — ASSESSMENT & PLAN NOTE
Orders:    CBC & Differential; Future    Lipid Panel; Future    Comprehensive Metabolic Panel; Future

## 2025-07-05 NOTE — ASSESSMENT & PLAN NOTE
{Hypertension is (optional):[id number]}    Orders:    CBC & Differential; Future    Lipid Panel; Future    Comprehensive Metabolic Panel; Future

## 2025-07-05 NOTE — LETTER
James Gary Meese  [address] [address]    July 5, 2025     Dear Mr. Meese:    Below are the results from your recent visit:    Resulted Orders   Lipid Panel   Result Value Ref Range    Total Cholesterol 128 0 - 200 mg/dL    Triglycerides 64 0 - 150 mg/dL    HDL Cholesterol 34 (L) 40 - 60 mg/dL    LDL Cholesterol  81 0 - 100 mg/dL    VLDL Cholesterol 13 5 - 40 mg/dL    LDL/HDL Ratio 2.39    Comprehensive Metabolic Panel   Result Value Ref Range    Glucose 92 65 - 99 mg/dL    BUN 16.0 8.0 - 23.0 mg/dL    Creatinine 1.02 0.76 - 1.27 mg/dL    Sodium 142 136 - 145 mmol/L    Potassium 4.6 3.5 - 5.2 mmol/L    Chloride 105 98 - 107 mmol/L    CO2 25.0 22.0 - 29.0 mmol/L    Calcium 9.2 8.6 - 10.5 mg/dL    Total Protein 6.7 6.0 - 8.5 g/dL    Albumin 4.2 3.5 - 5.2 g/dL    ALT (SGPT) 19 1 - 41 U/L    AST (SGOT) 25 1 - 40 U/L    Alkaline Phosphatase 80 39 - 117 U/L    Total Bilirubin 0.8 0.0 - 1.2 mg/dL    Globulin 2.5 gm/dL    A/G Ratio 1.7 g/dL    BUN/Creatinine Ratio 15.7 7.0 - 25.0    Anion Gap 12.0 5.0 - 15.0 mmol/L    eGFR 78.1 >60.0 mL/min/1.73   CBC Auto Differential   Result Value Ref Range    WBC 5.55 3.40 - 10.80 10*3/mm3    RBC 5.24 4.14 - 5.80 10*6/mm3    Hemoglobin 16.2 13.0 - 17.7 g/dL    Hematocrit 47.9 37.5 - 51.0 %    MCV 91.4 79.0 - 97.0 fL    MCH 30.9 26.6 - 33.0 pg    MCHC 33.8 31.5 - 35.7 g/dL    RDW 12.0 (L) 12.3 - 15.4 %    RDW-SD 38.5 37.0 - 54.0 fl    MPV 9.5 6.0 - 12.0 fL    Platelets 268 140 - 450 10*3/mm3    Neutrophil % 69.9 42.7 - 76.0 %    Lymphocyte % 16.6 (L) 19.6 - 45.3 %    Monocyte % 8.5 5.0 - 12.0 %    Eosinophil % 3.4 0.3 - 6.2 %    Basophil % 0.7 0.0 - 1.5 %    Immature Grans % 0.9 (H) 0.0 - 0.5 %    Neutrophils, Absolute 3.88 1.70 - 7.00 10*3/mm3    Lymphocytes, Absolute 0.92 0.70 - 3.10 10*3/mm3    Monocytes, Absolute 0.47 0.10 - 0.90 10*3/mm3    Eosinophils, Absolute 0.19 0.00 - 0.40 10*3/mm3    Basophils, Absolute 0.04 0.00 - 0.20 10*3/mm3    Immature Grans, Absolute  0.05 0.00 - 0.05 10*3/mm3    nRBC 0.0 0.0 - 0.2 /100 WBC       Labs look good.  Please continue your current medication and plan.      If you have any questions or concerns, please don't hesitate to call.         Sincerely,        Breana Harden MD

## (undated) DEVICE — CANNULA SEAL

## (undated) DEVICE — SPNG GZ WOVN 4X4IN 12PLY 10/BX STRL

## (undated) DEVICE — SUT PDS MONO 0 36 CT1 VIL PDP346H

## (undated) DEVICE — PATIENT RETURN ELECTRODE, SINGLE-USE, CONTACT QUALITY MONITORING, ADULT, WITH 9FT CORD, FOR PATIENTS WEIGING OVER 33LBS. (15KG): Brand: MEGADYNE

## (undated) DEVICE — SUT SILK 2/0 PS 18IN 1588H

## (undated) DEVICE — SOL ANTISTICK CAUTRY ELECTROLUBE LF

## (undated) DEVICE — ST TBG CONN PNEUMOCLEAR EVAC SMOKE HEAT/HUMID

## (undated) DEVICE — TIP COVER ACCESSORY

## (undated) DEVICE — SUT VIC 0 TIES 18IN J912G

## (undated) DEVICE — PK UROL DAVINCI 10

## (undated) DEVICE — TISSUE RETRIEVAL SYSTEM: Brand: INZII RETRIEVAL SYSTEM

## (undated) DEVICE — GLV SURG SENSICARE PI MIC PF SZ7.5 LF STRL

## (undated) DEVICE — CVR HNDL LIGHT RIGID

## (undated) DEVICE — COLUMN DRAPE

## (undated) DEVICE — HLDR CATH FOLEY STATLOCK TRICOT PED 3WY

## (undated) DEVICE — ARM DRAPE

## (undated) DEVICE — GOWN,NON-REINFORCED,SIRUS,SET IN SLV,XL: Brand: MEDLINE

## (undated) DEVICE — BLANKT WARM UPPR/BDY ARM/OUT 57X196CM

## (undated) DEVICE — ENDOPOUCH RETRIEVER SPECIMEN RETRIEVAL BAGS: Brand: ENDOPOUCH RETRIEVER

## (undated) DEVICE — LEX PROSTATE ACCESSORY PACK: Brand: MEDLINE INDUSTRIES, INC.

## (undated) DEVICE — ENDOPATH XCEL BLADELESS TROCARS WITH STABILITY SLEEVES: Brand: ENDOPATH XCEL

## (undated) DEVICE — VESSEL SEALER EXTEND: Brand: ENDOWRIST

## (undated) DEVICE — Device

## (undated) DEVICE — THE ECHELON FLEX POWERED PLUS ARTICULATING ENDOSCOPIC LINEAR CUTTERS ARE STERILE, SINGLE PATIENT USE INSTRUMENTS THAT SIMULTANEOUSLYCUT AND STAPLE TISSUE. THERE ARE SIX STAGGERED ROWS OF STAPLES, THREE ON EITHER SIDE OF THE CUT LINE. THE ECHELON FLEX 45 POWERED PLUSINSTRUMENTS HAVE A STAPLE LINE THAT IS APPROXIMATELY 45 MM LONG AND A CUT LINE THAT IS APPROXIMATELY 42 MM LONG. THE SHAFT CAN ROTATE FREELYIN BOTH DIRECTIONS AND AN ARTICULATION MECHANISM ENABLES THE DISTAL PORTION OF THE SHAFT TO PIVOT TO FACILITATE LATERAL ACCESS TO THE OPERATIVESITE.THE INSTRUMENTS ARE PACKAGED WITH A PRIMARY LITHIUM BATTERY PACK THAT MUST BE INSTALLED PRIOR TO USE. THERE ARE SPECIFIC REQUIREMENTS FORDISPOSING OF THE BATTERY PACK. REFER TO THE BATTERY PACK DISPOSAL SECTION.THE INSTRUMENTS ARE PACKAGED WITHOUT A RELOAD AND MUST BE LOADED PRIOR TO USE. A STAPLE RETAINING CAP ON THE RELOAD PROTECTS THE STAPLE LEGPOINTS DURING SHIPPING AND TRANSPORTATION. THE INSTRUMENTS’ LOCK-OUT FEATURE IS DESIGNED TO PREVENT A USED OR IMPROPERLY INSTALLED RELOADFROM BEING REFIRED OR AN INSTRUMENT FROM BEING FIRED WITHOUT A RELOAD.: Brand: ECHELON FLEX

## (undated) DEVICE — LAP PORT CLOSURE GUIDES 5MM AND 10/12MM: Brand: LAP PORT CLOSURE GUIDES 5MM AND 10/12MM

## (undated) DEVICE — BLADELESS OBTURATOR: Brand: WECK VISTA

## (undated) DEVICE — SUT VIC 0 UR6 27IN VCP603H

## (undated) DEVICE — CATHETER,FOLEY,100%SILICONE,20FR,10ML,LF: Brand: MEDLINE